# Patient Record
Sex: FEMALE | Race: BLACK OR AFRICAN AMERICAN | NOT HISPANIC OR LATINO | Employment: UNEMPLOYED | URBAN - METROPOLITAN AREA
[De-identification: names, ages, dates, MRNs, and addresses within clinical notes are randomized per-mention and may not be internally consistent; named-entity substitution may affect disease eponyms.]

---

## 2017-04-07 ENCOUNTER — OFFICE VISIT (OUTPATIENT)
Dept: OBSTETRICS AND GYNECOLOGY | Facility: CLINIC | Age: 62
End: 2017-04-07
Payer: MEDICAID

## 2017-04-07 VITALS
HEIGHT: 62 IN | SYSTOLIC BLOOD PRESSURE: 108 MMHG | BODY MASS INDEX: 38.14 KG/M2 | WEIGHT: 207.25 LBS | DIASTOLIC BLOOD PRESSURE: 60 MMHG | TEMPERATURE: 99 F

## 2017-04-07 DIAGNOSIS — R10.2 FEMALE PELVIC PAIN: ICD-10-CM

## 2017-04-07 DIAGNOSIS — E11.59 TYPE 2 DIABETES MELLITUS WITH OTHER CIRCULATORY COMPLICATION: ICD-10-CM

## 2017-04-07 DIAGNOSIS — N85.2 ENLARGED UTERUS: ICD-10-CM

## 2017-04-07 DIAGNOSIS — E78.5 HYPERLIPIDEMIA, UNSPECIFIED HYPERLIPIDEMIA TYPE: ICD-10-CM

## 2017-04-07 DIAGNOSIS — J45.31 MILD PERSISTENT ASTHMA WITH ACUTE EXACERBATION: ICD-10-CM

## 2017-04-07 DIAGNOSIS — Z95.0 PACEMAKER: ICD-10-CM

## 2017-04-07 DIAGNOSIS — Z01.419 WELL WOMAN EXAM WITH ROUTINE GYNECOLOGICAL EXAM: Primary | ICD-10-CM

## 2017-04-07 DIAGNOSIS — I50.9 CONGESTIVE HEART FAILURE, UNSPECIFIED CONGESTIVE HEART FAILURE CHRONICITY, UNSPECIFIED CONGESTIVE HEART FAILURE TYPE: ICD-10-CM

## 2017-04-07 DIAGNOSIS — R60.0 PEDAL EDEMA: ICD-10-CM

## 2017-04-07 DIAGNOSIS — Z12.31 VISIT FOR SCREENING MAMMOGRAM: ICD-10-CM

## 2017-04-07 DIAGNOSIS — Z79.01 CHRONIC ANTICOAGULATION: ICD-10-CM

## 2017-04-07 PROCEDURE — 99213 OFFICE O/P EST LOW 20 MIN: CPT | Mod: PBBFAC | Performed by: OBSTETRICS & GYNECOLOGY

## 2017-04-07 PROCEDURE — 99386 PREV VISIT NEW AGE 40-64: CPT | Mod: S$PBB,,, | Performed by: OBSTETRICS & GYNECOLOGY

## 2017-04-07 PROCEDURE — 88175 CYTOPATH C/V AUTO FLUID REDO: CPT

## 2017-04-07 PROCEDURE — 99999 PR PBB SHADOW E&M-EST. PATIENT-LVL III: CPT | Mod: PBBFAC,,, | Performed by: OBSTETRICS & GYNECOLOGY

## 2017-04-07 RX ORDER — ALBUTEROL SULFATE 0.83 MG/ML
SOLUTION RESPIRATORY (INHALATION)
Refills: 5 | Status: ON HOLD | COMMUNITY
Start: 2017-02-20 | End: 2019-04-22 | Stop reason: HOSPADM

## 2017-04-07 RX ORDER — POTASSIUM CHLORIDE 750 MG/1
10 TABLET, EXTENDED RELEASE ORAL EVERY OTHER DAY
Refills: 2 | Status: ON HOLD | COMMUNITY
Start: 2016-12-31 | End: 2019-08-11 | Stop reason: HOSPADM

## 2017-04-07 RX ORDER — IPRATROPIUM BROMIDE AND ALBUTEROL 20; 100 UG/1; UG/1
1 SPRAY, METERED RESPIRATORY (INHALATION) EVERY 6 HOURS PRN
Refills: 6 | COMMUNITY
Start: 2017-03-14 | End: 2019-03-13

## 2017-04-07 RX ORDER — MONTELUKAST SODIUM 10 MG/1
10 TABLET ORAL DAILY
Refills: 6 | COMMUNITY
Start: 2017-02-18 | End: 2019-05-22

## 2017-04-07 RX ORDER — PREDNISONE 20 MG/1
TABLET ORAL
Refills: 0 | COMMUNITY
Start: 2017-03-30 | End: 2019-04-22

## 2017-04-07 RX ORDER — CLOPIDOGREL BISULFATE 75 MG/1
75 TABLET ORAL DAILY
Refills: 6 | COMMUNITY
Start: 2017-03-16

## 2017-04-07 RX ORDER — CARVEDILOL 6.25 MG/1
TABLET ORAL
Refills: 6 | Status: ON HOLD | COMMUNITY
Start: 2017-03-22 | End: 2019-08-11 | Stop reason: HOSPADM

## 2017-04-07 RX ORDER — LINAGLIPTIN 5 MG/1
5 TABLET, FILM COATED ORAL DAILY
Refills: 2 | COMMUNITY
Start: 2017-01-20 | End: 2019-04-22

## 2017-04-07 RX ORDER — OMEPRAZOLE 20 MG/1
CAPSULE, DELAYED RELEASE ORAL
Refills: 0 | COMMUNITY
Start: 2017-02-13 | End: 2019-04-22

## 2017-04-07 RX ORDER — PRAVASTATIN SODIUM 10 MG/1
TABLET ORAL
Refills: 4 | COMMUNITY
Start: 2017-03-15

## 2017-04-07 RX ORDER — AMIODARONE HYDROCHLORIDE 200 MG/1
TABLET ORAL
Refills: 5 | COMMUNITY
Start: 2017-03-14

## 2017-04-07 RX ORDER — FUROSEMIDE 40 MG/1
TABLET ORAL
Refills: 5 | Status: ON HOLD | COMMUNITY
Start: 2017-03-14 | End: 2019-06-09 | Stop reason: HOSPADM

## 2017-04-07 RX ORDER — ALBUTEROL SULFATE 90 UG/1
AEROSOL, METERED RESPIRATORY (INHALATION)
Refills: 5 | COMMUNITY
Start: 2017-03-14 | End: 2017-04-07 | Stop reason: SDUPTHER

## 2017-04-07 RX ORDER — LEVOTHYROXINE SODIUM 50 UG/1
50 TABLET ORAL DAILY
Refills: 6 | COMMUNITY
Start: 2017-03-14

## 2017-04-07 NOTE — LETTER
April 7, 2017      Gerda Carter MD  [address] [facility] [address]           [facility] [place] [facility]  [address] [facility] [address]  [place] [address]  Phone: [phone]          Patient: Charu Oswald   MR Number: [id number]   YOB: 1955   Date of Visit: 4/7/2017       Dear Dr. Gerda Carter:    Thank you for referring Charu Oswald to me for evaluation. Attached you will find relevant portions of my assessment and plan of care.    If you have questions, please do not hesitate to call me. I look forward to following Charu Oswald along with you.    Sincerely,    Raymond Issa MD    Enclosure  CC:  No Recipients    If you would like to receive this communication electronically, please contact [email or web address] or [phone] to request more information on [facility] access.    For providers and/or their staff who would like to refer a patient to [facility], please contact us through our one-stop-shop provider referral line, [facility] , at [phone].    If you feel you have received this communication in error or would no longer like to receive these types of communications, please e-mail [email or web address]

## 2017-04-07 NOTE — PROGRESS NOTES
Subjective:       Patient ID: Charu Oswald is a 61 y.o. female.    Chief Complaint:  Abdominal Pain      History of Present Illness  HPI  Annual Exam-Postmenopausal  Patient presents for annual exam. The patient is not currently sexually active. GYN screening history: no prior history of gyn screening tests. The patient is not taking hormone replacement therapy. Patient denies post-menopausal vaginal bleeding. The patient wears seatbelts: yes. The patient participates in regular exercise: no. Has the patient ever been transfused or tattooed?: yes. The patient reports that there is not domestic violence in her life.    She complains of vague abdominal pain.  More on lower abdomen.  Sometimes, 8/10.  Not constant.  More at night.  Sometimes more aching than pain.  No radiation.  No aggravating or alleviating factors.  No GI complaint.  Frequent urination.     History of type II Diabetes Mellitus, congestive heart failure, on chronic anticoagulation, asthma.      GYN & OB History  No LMP recorded.   Date of Last Pap: No result found    OB History    Para Term  AB SAB TAB Ectopic Multiple Living   1    1 1          # Outcome Date GA Lbr Guy/2nd Weight Sex Delivery Anes PTL Lv   1 SAB                 Past Medical History:   Diagnosis Date    Asthma     Congestive heart failure     Diabetes mellitus     Type II    Hyperlipidemia     Pacemaker     Pedal edema        Past Surgical History:   Procedure Laterality Date    Cardiac bypass      CARDIAC PACEMAKER PLACEMENT         Family History   Problem Relation Age of Onset    Heart disease Father     Heart disease Mother     Cancer Mother      Bone and breast    Breast cancer Mother 50    Diabetes Brother     Diabetes Sister        Social History     Social History    Marital status: Significant Other     Spouse name: N/A    Number of children: N/A    Years of education: N/A     Social History Main Topics    Smoking status: Former  Smoker    Smokeless tobacco: Never Used    Alcohol use No    Drug use: No    Sexual activity: No     Other Topics Concern    None     Social History Narrative    Together since 4/2004    He is a     She is a CNA    They no longer sexually-active       Current Outpatient Prescriptions   Medication Sig Dispense Refill    albuterol (PROVENTIL) 2.5 mg /3 mL (0.083 %) nebulizer solution USE 1 VAIL IN NEBULIZER EVERY 4 TO 6 HOUR AS NEEDED SOV  5    amiodarone (PACERONE) 200 MG Tab TAKE 1 TABLET BY MOUTH IN THE MORNING AND A HALF TABLET BY MOUTH IN THE EVENING  5    carvedilol (COREG) 6.25 MG tablet TK 1 T PO  BID  6    clopidogrel (PLAVIX) 75 mg tablet Take 75 mg by mouth once daily.  6    COMBIVENT RESPIMAT  mcg/actuation inhaler 1 puff every 6 (six) hours as needed.  6    furosemide (LASIX) 40 MG tablet 1 TABLET BY MOUTH DAILY MAY TAKE TAB IN THE EVENING IF FOR SHORTNESS OF BREATH OR 5LB WEIGHT GAIN  5    levothyroxine (SYNTHROID) 50 MCG tablet Take 50 mcg by mouth once daily.  6    montelukast (SINGULAIR) 10 mg tablet Take 10 mg by mouth once daily.  6    omeprazole (PRILOSEC) 20 MG capsule TK ONE C PO  D  0    potassium chloride SA (K-DUR,KLOR-CON) 10 MEQ tablet Take 10 mEq by mouth every other day.  2    pravastatin (PRAVACHOL) 10 MG tablet TK 1 T PO QHS  4    predniSONE (DELTASONE) 20 MG tablet TK 3 TS PO ONCE D FOR 4 DAYS  0    TRADJENTA 5 mg Tab tablet Take 5 mg by mouth once daily.  2     No current facility-administered medications for this visit.        Review of patient's allergies indicates:   Allergen Reactions    Nut - unspecified Shortness Of Breath       Review of Systems  Review of Systems   Constitutional: Negative for activity change, appetite change, chills, fatigue, fever and unexpected weight change.   HENT: Negative for mouth sores.    Respiratory: Negative for cough, shortness of breath and wheezing.    Cardiovascular: Negative for chest pain and  palpitations.   Gastrointestinal: Positive for abdominal pain. Negative for bloating, blood in stool, constipation, nausea and vomiting.   Endocrine: Negative for diabetes and hot flashes.   Genitourinary: Positive for pelvic pain. Negative for dyspareunia, dysuria, frequency, hematuria, menorrhagia, menstrual problem, urgency, vaginal bleeding, vaginal discharge, vaginal pain, dysmenorrhea, urinary incontinence, postcoital bleeding and vaginal odor.   Musculoskeletal: Negative for back pain and myalgias.   Skin:  Negative for rash.   Neurological: Negative for seizures and headaches.   Psychiatric/Behavioral: Negative for depression and sleep disturbance. The patient is not nervous/anxious.    Breast: Negative for breast mass, breast pain and nipple discharge          Objective:    Physical Exam:   Constitutional: She appears well-developed and well-nourished. No distress.    HENT:   Head: Normocephalic and atraumatic.    Eyes: EOM are normal.    Neck: Normal range of motion.     Pulmonary/Chest: Effort normal. No respiratory distress.   Breasts: Non-tender, no engorgement, no masses, no retraction, no discharge. Negative for lymphadenopathy.         Abdominal: Soft. She exhibits no distension. There is no tenderness. There is no rebound and no guarding.     Genitourinary: Vagina normal. No vaginal discharge found.   Genitourinary Comments: Vulva without any obvious lesions.  Vaginal vault with good support.  Minimal discharge noted.  No obvious lesion.  Cervix is without any cervical motion tenderness.  No obvious lesion.  Uterus is about 14 weeks, minimally tender, normal contour.  Adnexa is without any masses or tenderness.    ?Bladder tenderness           Musculoskeletal: Normal range of motion.       Neurological: She is alert.    Skin: Skin is warm and dry.    Psychiatric: She has a normal mood and affect.          Urinalysis negative    Assessment:        1. Well woman exam with routine gynecological exam     2. Type 2 diabetes mellitus with other circulatory complication    3. Mild persistent asthma with acute exacerbation    4. Hyperlipidemia, unspecified hyperlipidemia type    5. Congestive heart failure, unspecified congestive heart failure chronicity, unspecified congestive heart failure type    6. Pedal edema    7. Pacemaker    8. Female pelvic pain    9. Visit for screening mammogram    10. Chronic anticoagulation    11. Enlarged uterus              Plan:          I have discussed with the patient her condition.  Monthly breast examination was instructed, discussed, and encouraged.  Patient was encouraged to consume a low-calorie, low fat diet, and to increase of physical activity.  Healthy habits encouraged.  A Pap smear was performed.  Mammogram was ordered because of the combination of her age and risk factors.  Gonorrhea and Chlamydia testing not performed.  HIV test not ordered.  Patient is to continue her medications as prescribed.  She will come back to see me in one year for her annual visit.  She can come back to see me sooner as necessary.  All of her questions were answered appropriately to her satisfaction.     We discussed her abdominal/pelvic pain with uterine enlargement  Pelvic ultrasound ordered  She will be back after ultrasound for results.

## 2017-04-07 NOTE — MR AVS SNAPSHOT
[facility] - OB/ GYN  [address] [facility] [address]  [place] [address]  Phone: [phone]                  Charu Oswald   2017 9:00 AM   Office Visit    Description:  Female : 1955   Provider:  Raymond Issa MD   Department:  [facility] - OB/ GYN           Reason for Visit     Abdominal Pain           Diagnoses this Visit        Comments    Well woman exam with routine gynecological exam    -  Primary     Type 2 diabetes mellitus with other circulatory complication         Mild persistent asthma with acute exacerbation         Hyperlipidemia, unspecified hyperlipidemia type         Congestive heart failure, unspecified congestive heart failure chronicity, unspecified congestive heart failure type         Pedal edema         Pacemaker         Female pelvic pain         Visit for screening mammogram         Chronic anticoagulation         Enlarged uterus                To Do List           Goals (5 Years of Data)     None      Follow-Up and Disposition     Return in about 4 weeks (around 2017).      [facility]     [facility] Nurse Care Line -  Assistance  Unless otherwise directed by your provider, please contact [facility], our nurse care line that is available for  assistance.     Registered nurses in the [facility] provide: appointment scheduling, clinical advisement, health education, and other advisory services.  Call: [phone] (toll free)               Medications           Message regarding Medications     Verify the changes and/or additions to your medication regime listed below are the same as discussed with your clinician today.  If any of these changes or additions are incorrect, please notify your healthcare provider.        STOP taking these medications     VENTOLIN HFA 90 mcg/actuation inhaler 2 PUFFS EVERY 4-6 HOURS AS NEEDED FOR SHORTNESS OF BREATH           Verify that the below list of medications is an accurate representation of the  "medications you are currently taking.  If none reported, the list may be blank. If incorrect, please contact your healthcare provider. Carry this list with you in case of emergency.           Current Medications     albuterol (PROVENTIL) 2.5 mg /3 mL (0.083 %) nebulizer solution USE 1 VAIL IN NEBULIZER EVERY 4 TO 6 HOUR AS NEEDED SOV    amiodarone (PACERONE) 200 MG Tab TAKE 1 TABLET BY MOUTH IN THE MORNING AND A HALF TABLET BY MOUTH IN THE EVENING    carvedilol (COREG) 6.25 MG tablet TK 1 T PO  BID    clopidogrel (PLAVIX) 75 mg tablet Take 75 mg by mouth once daily.    COMBIVENT RESPIMAT  mcg/actuation inhaler 1 puff every 6 (six) hours as needed.    furosemide (LASIX) 40 MG tablet 1 TABLET BY MOUTH DAILY MAY TAKE TAB IN THE EVENING IF FOR SHORTNESS OF BREATH OR 5LB WEIGHT GAIN    levothyroxine (SYNTHROID) 50 MCG tablet Take 50 mcg by mouth once daily.    montelukast (SINGULAIR) 10 mg tablet Take 10 mg by mouth once daily.    omeprazole (PRILOSEC) 20 MG capsule TK ONE C PO  D    potassium chloride SA (K-DUR,KLOR-CON) 10 MEQ tablet Take 10 mEq by mouth every other day.    pravastatin (PRAVACHOL) 10 MG tablet TK 1 T PO QHS    predniSONE (DELTASONE) 20 MG tablet TK 3 TS PO ONCE D FOR 4 DAYS    TRADJENTA 5 mg Tab tablet Take 5 mg by mouth once daily.           Clinical Reference Information           Your Vitals Were     BP Temp Height Weight BMI    108/60 (BP Location: Right arm, Patient Position: Sitting, BP Method: Manual) 98.6 °F (37 °C) (Oral) 5' 2" (1.575 m) 94 kg (207 lb 3.7 oz) 37.9 kg/m2      Blood Pressure          Most Recent Value    BP  108/60      Allergies as of 4/7/2017     Nut - Unspecified      Immunizations Administered on Date of Encounter - 4/7/2017     None      Orders Placed During Today's Visit      Normal Orders This Visit    Liquid-based pap smear, screening     Future Labs/Procedures Expected by Expires    Mammo Digital Screening Bilat with CAD  4/7/2017 6/7/2018    US Pelvis Comp with " Transvag NON-OB (xpd  4/7/2017 4/7/2018      Language Assistance Services     ATTENTION: Language assistance services are available, free of charge. Please call [phone].      ATENCIÓN: Si shadi sullivan, tiene a crowell disposición servicios gratuitos de asistencia lingüística. Llame al [phone].     CHÚ Ý: N?u b?n nói Ti?ng Vi?t, có các d?ch v? h? tr? ngôn ng? mi?n phí dành cho b?n. G?i s? [phone].         [facility] [place] - [facility] complies with applicable Federal civil rights laws and does not discriminate on the basis of race, color, national origin, age, disability, or sex.

## 2017-04-09 ENCOUNTER — HOSPITAL ENCOUNTER (EMERGENCY)
Facility: OTHER | Age: 62
Discharge: HOME | End: 2017-04-09
Attending: EMERGENCY MEDICINE
Payer: MEDICAID

## 2017-04-09 VITALS
DIASTOLIC BLOOD PRESSURE: 82 MMHG | HEART RATE: 62 BPM | OXYGEN SATURATION: 99 % | RESPIRATION RATE: 18 BRPM | SYSTOLIC BLOOD PRESSURE: 148 MMHG | BODY MASS INDEX: 36.44 KG/M2 | WEIGHT: 198 LBS | HEIGHT: 62 IN

## 2017-04-09 DIAGNOSIS — I50.9 ACUTE ON CHRONIC CONGESTIVE HEART FAILURE, UNSPECIFIED CONGESTIVE HEART FAILURE TYPE: Primary | ICD-10-CM

## 2017-04-09 LAB
ALBUMIN SERPL-MCNC: 3.1 G/DL (ref 3.3–5.5)
ALP SERPL-CCNC: 81 U/L (ref 42–141)
BILIRUB SERPL-MCNC: 0.4 MG/DL (ref 0.2–1.6)
BUN SERPL-MCNC: 17 MG/DL (ref 7–22)
CALCIUM SERPL-MCNC: 9 MG/DL (ref 8–10.3)
CHLORIDE SERPL-SCNC: 98 MMOL/L (ref 98–108)
CREAT SERPL-MCNC: 1.8 MG/DL (ref 0.6–1.2)
GLUCOSE SERPL-MCNC: 287 MG/DL (ref 73–118)
POC ALT (SGPT): 18 U/L (ref 10–47)
POC AST (SGOT): 23 U/L (ref 11–38)
POC B-TYPE NATRIURETIC PEPTIDE: 946 PG/ML (ref 0–100)
POC CARDIAC TROPONIN I: 0.03 NG/ML
POC PTINR: 1 (ref 0.9–1.2)
POC PTWBT: 12.2 SEC (ref 9.7–14.3)
POC TCO2: 27 MMOL/L (ref 18–33)
POTASSIUM BLD-SCNC: 3.6 MMOL/L (ref 3.6–5.1)
PROTEIN, POC: 7 G/DL (ref 6.4–8.1)
SAMPLE: NORMAL
SAMPLE: NORMAL
SODIUM BLD-SCNC: 148 MMOL/L (ref 128–145)

## 2017-04-09 PROCEDURE — 85610 PROTHROMBIN TIME: CPT

## 2017-04-09 PROCEDURE — 96374 THER/PROPH/DIAG INJ IV PUSH: CPT

## 2017-04-09 PROCEDURE — 99284 EMERGENCY DEPT VISIT MOD MDM: CPT | Mod: 25

## 2017-04-09 PROCEDURE — 25000003 PHARM REV CODE 250: Performed by: EMERGENCY MEDICINE

## 2017-04-09 PROCEDURE — 93005 ELECTROCARDIOGRAM TRACING: CPT

## 2017-04-09 PROCEDURE — 63600175 PHARM REV CODE 636 W HCPCS: Performed by: EMERGENCY MEDICINE

## 2017-04-09 PROCEDURE — 83880 ASSAY OF NATRIURETIC PEPTIDE: CPT

## 2017-04-09 PROCEDURE — 80053 COMPREHEN METABOLIC PANEL: CPT

## 2017-04-09 PROCEDURE — 85025 COMPLETE CBC W/AUTO DIFF WBC: CPT

## 2017-04-09 PROCEDURE — 84484 ASSAY OF TROPONIN QUANT: CPT

## 2017-04-09 PROCEDURE — 93010 ELECTROCARDIOGRAM REPORT: CPT | Mod: ,,, | Performed by: INTERNAL MEDICINE

## 2017-04-09 RX ORDER — FUROSEMIDE 10 MG/ML
40 INJECTION INTRAMUSCULAR; INTRAVENOUS
Status: COMPLETED | OUTPATIENT
Start: 2017-04-09 | End: 2017-04-09

## 2017-04-09 RX ORDER — FUROSEMIDE 40 MG/1
40 TABLET ORAL 2 TIMES DAILY
Qty: 10 TABLET | Refills: 0 | Status: SHIPPED | OUTPATIENT
Start: 2017-04-09 | End: 2017-04-14

## 2017-04-09 RX ADMIN — FUROSEMIDE 40 MG: 10 INJECTION, SOLUTION INTRAMUSCULAR; INTRAVENOUS at 07:04

## 2017-04-09 RX ADMIN — NITROGLYCERIN 1 INCH: 20 OINTMENT TOPICAL at 07:04

## 2017-04-09 NOTE — ED NOTES
Dyspnea: Patient complains of shortness of breath at rest, and with any activity.  Symptoms include dry cough, dyspnea on exertion, edema  in the hands and shortness of breath. Symptoms began a few days ago, gradually worsening since that time.  Patient denies drainage from nose, hemoptysis or bleeding from her nose and tightness in chest. Associated symptoms include myalgias and nasal congestion. Patient has not had recent travel.  Weight has been stable.  Appetite has been varying. Symptoms are exacerbated by any exercise, exposure to cold air, housework, lying flat and walking. Symptoms are alleviated by rest, nitroglycerine and diuretics.

## 2017-04-09 NOTE — ED PROVIDER NOTES
Encounter Date: 4/9/2017       History     Chief Complaint   Patient presents with    Shortness of Breath     CHF     Review of patient's allergies indicates:   Allergen Reactions    Nut - unspecified Shortness Of Breath     Patient is a 61 y.o. female presenting with the following complaint: shortness of breath. The history is provided by the patient.   Shortness of Breath   This is a recurrent problem. The problem occurs frequently (Worse when I lay flat).The problem has been gradually worsening. Associated symptoms include wheezing, PND and orthopnea. Pertinent negatives include no fever, no coryza, no sore throat, no cough, no hemoptysis, no chest pain, no syncope, no leg pain and no leg swelling. She has tried beta-agonist inhalers for the symptoms. She has had prior hospitalizations. She has had prior ED visits. Associated medical issues include heart failure.     Past Medical History:   Diagnosis Date    Asthma     Congestive heart failure     Diabetes mellitus     Type II    Hyperlipidemia     Pacemaker     Pedal edema      Past Surgical History:   Procedure Laterality Date    Cardiac bypass  1994    CARDIAC PACEMAKER PLACEMENT  2016     Family History   Problem Relation Age of Onset    Heart disease Father     Heart disease Mother     Cancer Mother      Bone and breast    Breast cancer Mother 50    Diabetes Brother     Diabetes Sister      Social History   Substance Use Topics    Smoking status: Former Smoker    Smokeless tobacco: Never Used    Alcohol use No     Review of Systems   Constitutional: Negative.  Negative for fever.   HENT: Negative.  Negative for sore throat.    Eyes: Negative.    Respiratory: Positive for shortness of breath and wheezing. Negative for cough and hemoptysis.    Cardiovascular: Positive for orthopnea and PND. Negative for chest pain, leg swelling and syncope.   Gastrointestinal: Negative.    Endocrine: Negative.    Genitourinary: Negative.    Musculoskeletal:  Negative.    Skin: Negative.    Allergic/Immunologic: Negative.    Neurological: Negative.    Hematological: Negative.    Psychiatric/Behavioral: Negative.    All other systems reviewed and are negative.      Physical Exam   Initial Vitals   BP Pulse Resp Temp SpO2   04/09/17 0707 04/09/17 0707 04/09/17 0707 -- 04/09/17 0707   150/84 79 16  100 %     Physical Exam    Nursing note and vitals reviewed.  Constitutional: Vital signs are normal. She appears well-developed. She is active and cooperative.   HENT:   Head: Normocephalic and atraumatic.   Eyes: Conjunctivae, EOM and lids are normal. Pupils are equal, round, and reactive to light.   Neck: Trachea normal and full passive range of motion without pain. Neck supple. No thyroid mass present.   Cardiovascular: Normal rate, regular rhythm, S1 normal, S2 normal, normal heart sounds, intact distal pulses and normal pulses.   Pulmonary/Chest: She has rales.   Abdominal: Soft. Normal appearance, normal aorta and bowel sounds are normal.   Musculoskeletal: Normal range of motion.   Lymphadenopathy:     She has no axillary adenopathy.   Neurological: She is alert and oriented to person, place, and time.   Skin: Skin is warm, dry and intact.   Psychiatric: She has a normal mood and affect. Her speech is normal and behavior is normal. Judgment and thought content normal. Cognition and memory are normal.         ED Course   Procedures  Labs Reviewed   POCT CBC   POCT CMP   POCT TROPONIN   POCT B-TYPE NATRIURETIC PEPTIDE (BNP)   POCT PROTIME-INR     EKG Readings: (Independently Interpreted)   Initial Reading: No STEMI. Rhythm: Normal Sinus Rhythm. Heart Rate: 65. Ectopy: No Ectopy. Conduction: Normal (Nonspecific intraventricular conduction delay). ST Segments: Non-Specific ST Segment Depression. T Waves: Normal. Axis: Normal. Clinical Impression: Normal Sinus Rhythm      Imaging Results         X-Ray Chest AP Portable (Final result) Result time:  04/09/17 07:47:55    Final  result by Interface, Rad Results In (04/09/17 07:47:55)    Narrative:    Exam: Portable AP radiograph of the chest     Indication: Heart failure     Comparison: None available      Single lead pacemaker is intact.  Postsurgical CABG changes are noted.  The heart is   enlarged.  Mediastinum is within normal limits.  Pulmonary vessels are engorged.  Lung   apices are clear.  No significant pleural effusion.  Opacities overlying the lung bases   likely represent overlying soft tissues.     Impression:     Cardiomegaly and mild pulmonary vascular congestion without evidence for yuridia pulmonary   edema.     SL: 24 Signed by: Gray Oshea MD  2017-04-09 07:47:54                  Admission on 04/09/2017   Component Date Value Ref Range Status    POC PTWBT 04/09/2017 12.2  9.7 - 14.3 sec Final    POC PTINR 04/09/2017 1.0  0.9 - 1.2 Final    Sample 04/09/2017 UNK   Final    POC Cardiac Troponin I 04/09/2017 0.03  <0.09 ng/mL Final    Sample 04/09/2017 UNK   Final    Albumin, POC 04/09/2017 3.1* 3.3 - 5.5 g/dL Final    Alkaline Phosphatase, POC 04/09/2017 81  42 - 141 U/L Final    ALT (SGPT), POC 04/09/2017 18  10 - 47 U/L Final    AST (SGOT), POC 04/09/2017 23  11 - 38 U/L Final    POC BUN 04/09/2017 17  7 - 22 mg/dL Final    Calcium, POC 04/09/2017 9.0  8.0 - 10.3 mg/dL Final    POC Chloride 04/09/2017 98  98 - 108 mmol/L Final    POC Creatinine 04/09/2017 1.8* 0.6 - 1.2 mg/dL Final    POC Glucose 04/09/2017 287* 73 - 118 mg/dL Final    POC Potassium 04/09/2017 3.6* 3.6 - 5.1 mmol/L Final    POC Sodium 04/09/2017 148* 128 - 145 mmol/L Final    Bilirubin 04/09/2017 0.4  0.2 - 1.6 mg/dL Final    POC TCO2 04/09/2017 27  18 - 33 mmol/L Final    Protein 04/09/2017 7.0  6.4 - 8.1 g/dL Final    POC B-Type Natriuretic Peptide 04/09/2017 946* 0.0 - 100.0 pg/mL Final       Medical Decision Making:   ED Management:  The patient was given nitroglycerin paste as well as Lasix IV.  She adequately diuresed in  the department her chest x-ray evaluation shows some mild pulmonary vascular congestion but no signs of edema.  Patient's BNP is running around 900.  The patient's currently laying in bed resting comfortably and is not short of breath.  The patient can be managed as an outpatient as a mild exacerbation of her chronic congestive heart failure.                   ED Course     Clinical Impression:   The encounter diagnosis was Acute on chronic congestive heart failure, unspecified congestive heart failure type.          Gato Casper MD  04/09/17 0918       Gato Casper MD  04/09/17 0924

## 2017-04-09 NOTE — ED TRIAGE NOTES
SOB onset 4 days usually goes to [facility] also poor historian and medications unknown and non-compliant

## 2017-04-09 NOTE — ED AVS SNAPSHOT
[facility] EMERGENCY DEPARTMENT  [address] [facility]  [place] [address]               Charu Oswald   2017  7:06 AM   ED    Description:  Female : 1955   Department:  [facility]           Your Care was Coordinated By:     Provider Role From To    Gato Casper MD Attending Provider 17 0714 --      Reason for Visit     Shortness of Breath           Diagnoses this Visit        Comments    Acute on chronic congestive heart failure, unspecified congestive heart failure type    -  Primary       ED Disposition     ED Disposition Condition Comment    Discharge             To Do List           Follow-up Information     Follow up with Primary Doctor No In 1 week(s).       These Medications        Disp Refills Start End    furosemide (LASIX) 40 MG tablet 10 tablet 0 2017    Take 1 tablet (40 mg total) by mouth 2 (two) times daily. - Oral    Pharmacy: [id number]  [place]  [address] [place] [address] AT [facility] #: [phone]         [facility]     [facility] Nurse Care Line -  Assistance  Unless otherwise directed by your provider, please contact [facility], our nurse care line that is available for  assistance.     Registered nurses in the [facility] Center provide: appointment scheduling, clinical advisement, health education, and other advisory services.  Call: [phone] (toll free)               Medications           Message regarding Medications     Verify the changes and/or additions to your medication regime listed below are the same as discussed with your clinician today.  If any of these changes or additions are incorrect, please notify your healthcare provider.        START taking these NEW medications        Refills    furosemide (LASIX) 40 MG tablet 0    Sig: Take 1 tablet (40 mg total) by mouth 2 (two) times daily.    Class: Print    Route: Oral      These medications were  administered today        Dose Freq    nitroGLYCERIN 2% TD oint ointment 1 inch 1 inch ED 1 Time    Sig: Apply 1 inch topically ED 1 Time.    Class: Normal    Route: Topical    furosemide injection 40 mg 40 mg ED 1 Time    Sig: Inject 4 mLs (40 mg total) into the vein ED 1 Time.    Class: Normal    Route: Intravenous           Verify that the below list of medications is an accurate representation of the medications you are currently taking.  If none reported, the list may be blank. If incorrect, please contact your healthcare provider. Carry this list with you in case of emergency.           Current Medications     albuterol (PROVENTIL) 2.5 mg /3 mL (0.083 %) nebulizer solution USE 1 VAIL IN NEBULIZER EVERY 4 TO 6 HOUR AS NEEDED SOV    amiodarone (PACERONE) 200 MG Tab TAKE 1 TABLET BY MOUTH IN THE MORNING AND A HALF TABLET BY MOUTH IN THE EVENING    carvedilol (COREG) 6.25 MG tablet TK 1 T PO  BID    clopidogrel (PLAVIX) 75 mg tablet Take 75 mg by mouth once daily.    COMBIVENT RESPIMAT  mcg/actuation inhaler 1 puff every 6 (six) hours as needed.    furosemide (LASIX) 40 MG tablet 1 TABLET BY MOUTH DAILY MAY TAKE TAB IN THE EVENING IF FOR SHORTNESS OF BREATH OR 5LB WEIGHT GAIN    furosemide (LASIX) 40 MG tablet Take 1 tablet (40 mg total) by mouth 2 (two) times daily.    furosemide injection 40 mg Inject 4 mLs (40 mg total) into the vein ED 1 Time.    levothyroxine (SYNTHROID) 50 MCG tablet Take 50 mcg by mouth once daily.    montelukast (SINGULAIR) 10 mg tablet Take 10 mg by mouth once daily.    omeprazole (PRILOSEC) 20 MG capsule TK ONE C PO  D    potassium chloride SA (K-DUR,KLOR-CON) 10 MEQ tablet Take 10 mEq by mouth every other day.    pravastatin (PRAVACHOL) 10 MG tablet TK 1 T PO QHS    predniSONE (DELTASONE) 20 MG tablet TK 3 TS PO ONCE D FOR 4 DAYS    TRADJENTA 5 mg Tab tablet Take 5 mg by mouth once daily.           Clinical Reference Information           Your Vitals Were     BP Pulse Resp Height  "Weight SpO2    148/82 (Patient Position: Sitting) 62 18 5' 2" (1.575 m) 89.8 kg (198 lb) 99%    BMI                36.21 kg/m2          Allergies as of 4/9/2017        Reactions    Nut - Unspecified Shortness Of Breath      Immunizations Administered on Date of Encounter - 4/9/2017     None      ED Micro, Lab, POCT     Start Ordered       Status Ordering Provider    04/09/17 0757 04/09/17 0757  POCT B-type natriuretic peptide (BNP)  Once      Final result     04/09/17 0733 04/09/17 0733  Troponin ISTAT  Once      Final result     04/09/17 0730 04/09/17 0730  ISTAT PROCEDURE  Once      Final result     04/09/17 0728 04/09/17 0728  POCT CMP  Once      Final result     04/09/17 0720 04/09/17 0720  POCT CBC  Once      Final result     04/09/17 0720 04/09/17 0720  POCT CMP  Once      Completed     04/09/17 0720 04/09/17 0720  POCT Troponin  Once      Completed     04/09/17 0720 04/09/17 0720  POCT B-type natriuretic peptide (BNP)  Once      Completed     04/09/17 0720 04/09/17 0720  POCT Protime-INR  Once      Completed       ED Imaging Orders     Start Ordered       Status Ordering Provider    04/09/17 0720 04/09/17 0720  X-Ray Chest AP Portable  1 time imaging      Final result         Discharge Instructions           Left- or Right- Side Congestive Heart Failure    The heart is a large muscle. It is a pump that circulates blood throughout the body. Blood carries oxygen to all of the organs, including the brain, muscles, and skin. After your body takes the oxygen out of the blood, the blood returns to the heart. The right side of the heart collects the blood from the body and pumps it to the lungs. In the lungs, it gets fresh oxygen and gives up carbon dioxide. The oxygen-rich blood from the lungs then returns to the left side of the heart, where it is pumped back out to the rest of your body, starting the process all over.  Congestive heart failure (CHF) occurs when the heart muscle is weakened. This affects the " pumping action of the heart. Heart failure can affect the right side of the heart or the left side. But heart failure may affect not only the right side of the heart or only the left side. Although it may have started on one side, it can and often eventually does affect both sides.  Right-side heart failure  When the right side of the heart is weakened, it cant handle the blood it is getting from the rest of the body. This blood returns to the heart through veins. When too much pressure builds up in the veins, fluid leaks out into the tissues. Gravity then causes that fluid to move to those parts of the body that are the lowest. So one of the first symptoms of right-side CHF can include swelling in the feet and ankles. If the condition gets worse, the swelling can even go up past the knees. Sometimes it gets so severe, the liver can get congested as well.  Left-side heart failure  When the left side of the heart is weakened, it cant handle the blood it gets from the lungs. Pressure then builds up in the veins of the lungs, causing fluid to leak into the lung tissues. This may cause CHF and pulmonary edema. This causes you to feel short of breath, weak, or dizzy. These symptoms are often worse with exertion, such as when climbing stairs or walking up hills. Lying with your head flat is uncomfortable and can make your breathing worse. This may make sleeping difficult. You may need to use extra pillows to elevate your upper body to sleep well. The same is true when just resting during the daytime.  There are many causes of heart failure including:  · Coronary artery disease  · Past heart attack (also known as acute myocardial infarction, or AMI)  · High blood pressure  · Damaged heart valve  · Diabetes  · Obesity  · Cigarette smoking  · Alcohol abuse  Heart failure is a chronic condition. There is no cure. The purpose of medical treatment is to improve the pumping action of the heart. The main way to do this is  to remove excess water from the body. A number of medicines can help reach this goal, improve symptoms, and prevent the heart from becoming weaker. Sometimes, heart failure can become so severe that a device is placed in the heart to help with pumping. Another major goal is to better treat the causes of heart failure, such as diabetes and high blood pressure, by making changes in your lifestyle and maximizing medical control when needed.  Home care  Follow these guidelines when caring for yourself at home:  · Check your weight every day. This is very important because a sudden increase in weight gain could mean worsening heart failure. Keep these things in mind:  ¨ Use the same scale every day.  ¨ Weigh yourself at the same time every day.  ¨ Make sure the scale is on a hard floor surface, not on a rug or carpet.  ¨ Keep a record of your weight every day so your healthcare provider can see it. If you are not given a log sheet for this, keep a separate journal for this purpose.   · Cut back on the amount of salt (sodium) you eat. Follow your healthcare provider's recommendation on how much salt or sodium you should have each day.  ¨ Avoid high-salt foods. These include olives, pickles, smoked meats, salted potato chips, and most prepared foods.  ¨ Don't add salt to your food at the table. Use only small amounts of salt when cooking.  ¨ Read the labels carefully on food packages to learn how much salt or sodium is in each serving in the package. Remember, a can or package of food may contain more than 1 serving. So if you eat all the food in the package, you may be getting more salt than you think.  · Follow your healthcare provider's recommendations about how much fluid you should have. Be aware that some foods, such as soup, pudding, and juicy fruits like oranges or melons, contain liquid. You'll need to count the liquid in those foods as part of your daily fluid intake. Your provider can help you with this.  · Stop  smoking.  · Cut back on how much alcohol you drink.  · Lose weight if you are overweight. The excess weight adds a lot of stress on the workload of the heart.  · Stay active. Talk with your provider about an exercise program that is safe for your heart.  · Keep your feet elevated to reduce swelling. Ask your provider about support hose as a preventive treatment for daytime leg swelling.  Besides taking your medicine as instructed, an important part of treatment is lifestyle changes. These include diet, physical activity, stopping smoking, and weight control.  Improve your diet by including more fresh foods, cutting back on how much sugar and saturated fat you eat, and eating fewer processed foods and less salt.  Follow-up care  Follow up with your healthcare provider, or as advised.  Make sure to keep any appointments that were made for you. These can help better control your congestive heart failure. You will need to follow up with your provider on a routine basis to make sure your heart failure is well managed.  If an X-ray, ECG, or other tests were done, you will be told of any new findings that may affect your care.  Call 911  Call 911 if you:  · Become severely short of breath  · Feel lightheaded, or feel like you might pass out or faint  · Have chest pain or discomfort that is different than usual, the medicines your doctor told you to use for this don't help, or the pain lasts longer than 10 to 15 minutes  · You suddenly develop a rapid heart rate  When to seek medical advice  The following may be signs that your heart failure is getting worse. Call your healthcare provider right away if any of these happen:  · Sudden weight gain. This means 3 or more pounds in one day, or 5 or more pounds in 1 week.  · Trouble breathing not related to being active  · New or increased swelling of your legs or ankles  · Swelling or pain in your abdomen  · Breathing trouble at night. This means waking up short of breath  or needing more pillows to breathe.  · Frequent coughing that doesnt go away  · Feeling much more tired than usual  Date Last Reviewed: 1/4/2016  © 8834-0529 [employer]. [address] [place] [address], [place] [address]. All rights reserved. This information is not intended as a substitute for professional medical care. Always follow your healthcare professional's instructions.          Coping with Heart Failure  Its normal to feel sad or down at times when youre living with heart failure. Some medicines can also affect your mood. Following your treatment plan may seem difficult at times. If you feel overwhelmed, just focus on one day at a time. Dont be afraid to ask others for help when you need it.    Ways to feel better  Try not to withdraw from family and friends, even if you are finding it hard to talk to them. They can still be a good source of support. To feel better, you can also:  · Spend time doing things you enjoy. This may include participating in a favorite hobby, meditating, praying, or spending time with people you care about. Find activities that make you happy and make those a priority.  · Share what you learn about heart failure with the people in your life. Invite family members along when you visit your healthcare provider. This will help you feel supported as well as help you discuss the care plan you've agreed upon with your doctor.  · Think about joining a support group for people with heart failure. It may be easier to talk to people who know firsthand what youre going through. They can offer advice and share stories. You may want to ask loved ones to join you for a meeting.  Asking for help  Having heart failure doesnt mean that you have to feel bad all the time. Consider talking to your healthcare provider or a therapist if:  · You feel worthless or helpless, or are thinking about suicide. These are warning signs of depression. Treatment can help you feel better. When  depression is under control, your overall health may also improve.  · You feel anxious about what will happen to your loved ones if your health gets worse. Taking care of legal arrangements, such as a living will and durable power of , can help you feel more secure about the future.  · Social support helps alleviate stress and helps your stick with your healthy lifestyle changes. Without social support, you may end up back in the hospital.  Date Last Reviewed: 3/20/2016  © 2071-0041 [employer]. [address] [place] [address], [place] [address]. All rights reserved. This information is not intended as a substitute for professional medical care. Always follow your healthcare professional's instructions.          Smoking Cessation     If you would like to quit smoking:   You may be eligible for free services if you are a [place] resident and started smoking cigarettes before September 1, 1988.  Call the Smoking Cessation Trust (SCT) toll free at (764) 978-6042 or (998) 438-8405.   Call 1-800-QUIT-NOW if you do not meet the above criteria.   Contact us via email: [email or web address]   View our website for more information: [email or web address]         [facility] complies with applicable Federal civil rights laws and does not discriminate on the basis of race, color, national origin, age, disability, or sex.        Language Assistance Services     ATTENTION: Language assistance services are available, free of charge. Please call [phone].      ATENCIÓN: Si habla español, tiene a crowell disposición servicios gratuitos de asistencia lingüística. Llame al [phone].     CHÚ Ý: N?u b?n nói Ti?ng Vi?t, có các d?ch v? h? tr? ngôn ng? mi?n phí dành cho b?n. G?i s? [phone].

## 2017-04-09 NOTE — ED NOTES
Physician at bedside. Patient update on plan of care and reevaluation to notify patient to double her Lasix for the next 5 days. Patient verbalize understanding of her instructions

## 2017-04-09 NOTE — ED NOTES
Comfort measures done. Turned lights off provided a pillow and warm blanket. Updated patient on plan of care side rails up x 1.

## 2017-04-09 NOTE — DISCHARGE INSTRUCTIONS
Left- or Right- Side Congestive Heart Failure    The heart is a large muscle. It is a pump that circulates blood throughout the body. Blood carries oxygen to all of the organs, including the brain, muscles, and skin. After your body takes the oxygen out of the blood, the blood returns to the heart. The right side of the heart collects the blood from the body and pumps it to the lungs. In the lungs, it gets fresh oxygen and gives up carbon dioxide. The oxygen-rich blood from the lungs then returns to the left side of the heart, where it is pumped back out to the rest of your body, starting the process all over.  Congestive heart failure (CHF) occurs when the heart muscle is weakened. This affects the pumping action of the heart. Heart failure can affect the right side of the heart or the left side. But heart failure may affect not only the right side of the heart or only the left side. Although it may have started on one side, it can and often eventually does affect both sides.  Right-side heart failure  When the right side of the heart is weakened, it cant handle the blood it is getting from the rest of the body. This blood returns to the heart through veins. When too much pressure builds up in the veins, fluid leaks out into the tissues. Gravity then causes that fluid to move to those parts of the body that are the lowest. So one of the first symptoms of right-side CHF can include swelling in the feet and ankles. If the condition gets worse, the swelling can even go up past the knees. Sometimes it gets so severe, the liver can get congested as well.  Left-side heart failure  When the left side of the heart is weakened, it cant handle the blood it gets from the lungs. Pressure then builds up in the veins of the lungs, causing fluid to leak into the lung tissues. This may cause CHF and pulmonary edema. This causes you to feel short of breath, weak, or dizzy. These symptoms are often worse with exertion, such as  when climbing stairs or walking up hills. Lying with your head flat is uncomfortable and can make your breathing worse. This may make sleeping difficult. You may need to use extra pillows to elevate your upper body to sleep well. The same is true when just resting during the daytime.  There are many causes of heart failure including:  · Coronary artery disease  · Past heart attack (also known as acute myocardial infarction, or AMI)  · High blood pressure  · Damaged heart valve  · Diabetes  · Obesity  · Cigarette smoking  · Alcohol abuse  Heart failure is a chronic condition. There is no cure. The purpose of medical treatment is to improve the pumping action of the heart. The main way to do this is to remove excess water from the body. A number of medicines can help reach this goal, improve symptoms, and prevent the heart from becoming weaker. Sometimes, heart failure can become so severe that a device is placed in the heart to help with pumping. Another major goal is to better treat the causes of heart failure, such as diabetes and high blood pressure, by making changes in your lifestyle and maximizing medical control when needed.  Home care  Follow these guidelines when caring for yourself at home:  · Check your weight every day. This is very important because a sudden increase in weight gain could mean worsening heart failure. Keep these things in mind:  ¨ Use the same scale every day.  ¨ Weigh yourself at the same time every day.  ¨ Make sure the scale is on a hard floor surface, not on a rug or carpet.  ¨ Keep a record of your weight every day so your healthcare provider can see it. If you are not given a log sheet for this, keep a separate journal for this purpose.   · Cut back on the amount of salt (sodium) you eat. Follow your healthcare provider's recommendation on how much salt or sodium you should have each day.  ¨ Avoid high-salt foods. These include olives, pickles, smoked meats, salted potato chips, and  most prepared foods.  ¨ Don't add salt to your food at the table. Use only small amounts of salt when cooking.  ¨ Read the labels carefully on food packages to learn how much salt or sodium is in each serving in the package. Remember, a can or package of food may contain more than 1 serving. So if you eat all the food in the package, you may be getting more salt than you think.  · Follow your healthcare provider's recommendations about how much fluid you should have. Be aware that some foods, such as soup, pudding, and juicy fruits like oranges or melons, contain liquid. You'll need to count the liquid in those foods as part of your daily fluid intake. Your provider can help you with this.  · Stop smoking.  · Cut back on how much alcohol you drink.  · Lose weight if you are overweight. The excess weight adds a lot of stress on the workload of the heart.  · Stay active. Talk with your provider about an exercise program that is safe for your heart.  · Keep your feet elevated to reduce swelling. Ask your provider about support hose as a preventive treatment for daytime leg swelling.  Besides taking your medicine as instructed, an important part of treatment is lifestyle changes. These include diet, physical activity, stopping smoking, and weight control.  Improve your diet by including more fresh foods, cutting back on how much sugar and saturated fat you eat, and eating fewer processed foods and less salt.  Follow-up care  Follow up with your healthcare provider, or as advised.  Make sure to keep any appointments that were made for you. These can help better control your congestive heart failure. You will need to follow up with your provider on a routine basis to make sure your heart failure is well managed.  If an X-ray, ECG, or other tests were done, you will be told of any new findings that may affect your care.  Call 911  Call 911 if you:  · Become severely short of breath  · Feel lightheaded, or feel like you  might pass out or faint  · Have chest pain or discomfort that is different than usual, the medicines your doctor told you to use for this don't help, or the pain lasts longer than 10 to 15 minutes  · You suddenly develop a rapid heart rate  When to seek medical advice  The following may be signs that your heart failure is getting worse. Call your healthcare provider right away if any of these happen:  · Sudden weight gain. This means 3 or more pounds in one day, or 5 or more pounds in 1 week.  · Trouble breathing not related to being active  · New or increased swelling of your legs or ankles  · Swelling or pain in your abdomen  · Breathing trouble at night. This means waking up short of breath or needing more pillows to breathe.  · Frequent coughing that doesnt go away  · Feeling much more tired than usual  Date Last Reviewed: 1/4/2016  © 4665-0084 [employer]. [address] [place] [address]. All rights reserved. This information is not intended as a substitute for professional medical care. Always follow your healthcare professional's instructions.          Coping with Heart Failure  Its normal to feel sad or down at times when youre living with heart failure. Some medicines can also affect your mood. Following your treatment plan may seem difficult at times. If you feel overwhelmed, just focus on one day at a time. Dont be afraid to ask others for help when you need it.    Ways to feel better  Try not to withdraw from family and friends, even if you are finding it hard to talk to them. They can still be a good source of support. To feel better, you can also:  · Spend time doing things you enjoy. This may include participating in a favorite hobby, meditating, praying, or spending time with people you care about. Find activities that make you happy and make those a priority.  · Share what you learn about heart failure with the people in your life. Invite family members along when you  visit your healthcare provider. This will help you feel supported as well as help you discuss the care plan you've agreed upon with your doctor.  · Think about joining a support group for people with heart failure. It may be easier to talk to people who know firsthand what youre going through. They can offer advice and share stories. You may want to ask loved ones to join you for a meeting.  Asking for help  Having heart failure doesnt mean that you have to feel bad all the time. Consider talking to your healthcare provider or a therapist if:  · You feel worthless or helpless, or are thinking about suicide. These are warning signs of depression. Treatment can help you feel better. When depression is under control, your overall health may also improve.  · You feel anxious about what will happen to your loved ones if your health gets worse. Taking care of legal arrangements, such as a living will and durable power of , can help you feel more secure about the future.  · Social support helps alleviate stress and helps your stick with your healthy lifestyle changes. Without social support, you may end up back in the hospital.  Date Last Reviewed: 3/20/2016  © 4652-6024 [employer]. [address] [place] [address], [place] [address]. All rights reserved. This information is not intended as a substitute for professional medical care. Always follow your healthcare professional's instructions.

## 2018-10-16 ENCOUNTER — OFFICE VISIT (OUTPATIENT)
Dept: OBSTETRICS AND GYNECOLOGY | Facility: CLINIC | Age: 63
End: 2018-10-16
Payer: MEDICAID

## 2018-10-16 VITALS
SYSTOLIC BLOOD PRESSURE: 110 MMHG | WEIGHT: 195.56 LBS | HEIGHT: 62 IN | TEMPERATURE: 99 F | DIASTOLIC BLOOD PRESSURE: 62 MMHG | BODY MASS INDEX: 35.99 KG/M2

## 2018-10-16 DIAGNOSIS — Z01.419 WELL WOMAN EXAM WITH ROUTINE GYNECOLOGICAL EXAM: Primary | ICD-10-CM

## 2018-10-16 DIAGNOSIS — Z12.31 VISIT FOR SCREENING MAMMOGRAM: ICD-10-CM

## 2018-10-16 PROCEDURE — 99396 PREV VISIT EST AGE 40-64: CPT | Mod: S$PBB,,, | Performed by: OBSTETRICS & GYNECOLOGY

## 2018-10-16 PROCEDURE — 99213 OFFICE O/P EST LOW 20 MIN: CPT | Mod: PBBFAC | Performed by: OBSTETRICS & GYNECOLOGY

## 2018-10-16 PROCEDURE — 99999 PR PBB SHADOW E&M-EST. PATIENT-LVL III: CPT | Mod: PBBFAC,,, | Performed by: OBSTETRICS & GYNECOLOGY

## 2018-10-16 PROCEDURE — 88175 CYTOPATH C/V AUTO FLUID REDO: CPT

## 2018-10-16 NOTE — PROGRESS NOTES
"  Subjective:       Patient ID: Charu Oswald is a 63 y.o. female.    Chief Complaint:  Gynecologic Exam (Last pap was 17)      History of Present Illness  HPI  Annual Exam-Postmenopausal  Patient presents for annual exam. The patient has no complaints today except for a skin rash under her breasts and in her groin . The patient is NOT currently sexually active. GYN screening history: last pap: approximate date 2017 and was normal and last mammogram: patient has never had a mammogram. The patient is not taking hormone replacement therapy. Patient denies post-menopausal vaginal bleeding. The patient wears seatbelts: yes. The patient participates in regular exercise: no. Has the patient ever been transfused or tattooed?: yes. The patient reports that there is not domestic violence in her life.    Patient requests a cream for her rash.  Her PCP had prescribed an antifungal cream.  But "it does not work!"       GYN & OB History  No LMP recorded. Patient is postmenopausal.   Date of Last Pap: 2017    OB History    Para Term  AB Living   1       1     SAB TAB Ectopic Multiple Live Births   1              # Outcome Date GA Lbr Guy/2nd Weight Sex Delivery Anes PTL Lv   1 SAB                 Past Medical History:   Diagnosis Date    Asthma     Congestive heart failure     Diabetes mellitus     Type II    Hyperlipidemia     Pacemaker     Pedal edema        Past Surgical History:   Procedure Laterality Date    Cardiac bypass      CARDIAC PACEMAKER PLACEMENT         Family History   Problem Relation Age of Onset    Heart disease Father     Heart disease Mother     Cancer Mother         Bone and breast    Breast cancer Mother 50    Diabetes Brother     Diabetes Sister        Social History     Socioeconomic History    Marital status: Significant Other     Spouse name: None    Number of children: None    Years of education: None    Highest education level: None   Social Needs "    Financial resource strain: None    Food insecurity - worry: None    Food insecurity - inability: None    Transportation needs - medical: None    Transportation needs - non-medical: None   Occupational History    None   Tobacco Use    Smoking status: Former Smoker    Smokeless tobacco: Never Used   Substance and Sexual Activity    Alcohol use: No    Drug use: No    Sexual activity: No     Partners: Male   Other Topics Concern    None   Social History Narrative    Together since 4/2004    He is a     She is a CNA    They no longer sexually-active       Current Outpatient Medications   Medication Sig Dispense Refill    albuterol (PROVENTIL) 2.5 mg /3 mL (0.083 %) nebulizer solution USE 1 VAIL IN NEBULIZER EVERY 4 TO 6 HOUR AS NEEDED SOV  5    amiodarone (PACERONE) 200 MG Tab TAKE 1 TABLET BY MOUTH IN THE MORNING AND A HALF TABLET BY MOUTH IN THE EVENING  5    carvedilol (COREG) 6.25 MG tablet TK 1 T PO  BID  6    clopidogrel (PLAVIX) 75 mg tablet Take 75 mg by mouth once daily.  6    COMBIVENT RESPIMAT  mcg/actuation inhaler 1 puff every 6 (six) hours as needed.  6    furosemide (LASIX) 40 MG tablet 1 TABLET BY MOUTH DAILY MAY TAKE TAB IN THE EVENING IF FOR SHORTNESS OF BREATH OR 5LB WEIGHT GAIN  5    levothyroxine (SYNTHROID) 50 MCG tablet Take 50 mcg by mouth once daily.  6    montelukast (SINGULAIR) 10 mg tablet Take 10 mg by mouth once daily.  6    omeprazole (PRILOSEC) 20 MG capsule TK ONE C PO  D  0    potassium chloride SA (K-DUR,KLOR-CON) 10 MEQ tablet Take 10 mEq by mouth every other day.  2    pravastatin (PRAVACHOL) 10 MG tablet TK 1 T PO QHS  4    predniSONE (DELTASONE) 20 MG tablet TK 3 TS PO ONCE D FOR 4 DAYS  0    TRADJENTA 5 mg Tab tablet Take 5 mg by mouth once daily.  2     No current facility-administered medications for this visit.        Review of patient's allergies indicates:   Allergen Reactions    Nut - unspecified Shortness Of Breath        Review of Systems  Review of Systems   Constitutional: Positive for fatigue. Negative for activity change, appetite change, chills, fever and unexpected weight change.   HENT: Negative for mouth sores.    Respiratory: Negative for cough, shortness of breath and wheezing.    Cardiovascular: Negative for chest pain and palpitations.   Gastrointestinal: Negative for abdominal pain, bloating, blood in stool, constipation, nausea and vomiting.   Endocrine: Negative for diabetes and hot flashes.   Genitourinary: Negative for dyspareunia, dysuria, frequency, hematuria, menorrhagia, menstrual problem, pelvic pain, urgency, vaginal bleeding, vaginal discharge, vaginal pain, dysmenorrhea, urinary incontinence, postcoital bleeding and vaginal odor.   Musculoskeletal: Negative for back pain and myalgias.   Skin:  Positive for rash.   Neurological: Negative for seizures and headaches.   Psychiatric/Behavioral: Negative for depression and sleep disturbance. The patient is not nervous/anxious.    Breast: Positive for skin changes.Negative for breast mass, breast pain and nipple discharge          Objective:    Physical Exam:   Constitutional: She appears well-developed and well-nourished. No distress.    HENT:   Head: Normocephalic and atraumatic.    Eyes: EOM are normal.    Neck: Normal range of motion.     Pulmonary/Chest: Effort normal. No respiratory distress.   Breasts: Non-tender, no engorgement, no masses, no retraction, no discharge. Negative for lymphadenopathy.         Abdominal: Soft. She exhibits no distension. There is no tenderness. There is no rebound and no guarding.     Genitourinary: Vagina normal and uterus normal. No vaginal discharge found.   Genitourinary Comments: Significant atrophy.  Vulva without any obvious lesions.  Vaginal vault with good support.  Minimal white discharge noted.  No obvious lesion.  Cervix is without any cervical motion tenderness.  No obvious lesion.  Uterus is small,  non-tender, normal contour.  Adnexa is without any masses or tenderness.           Musculoskeletal: Normal range of motion.       Neurological: She is alert.    Skin: Skin is warm and dry.   Acanthosis nigricans under breasts, behind at nape of neck, in groin area.    Psychiatric: She has a normal mood and affect.          Assessment:        1. Well woman exam with routine gynecological exam    2. Visit for screening mammogram             Plan:          I have discussed with the patient her condition.  Monthly breast examination was instructed, discussed, and encouraged.  Patient was encouraged to consume a low-calorie, low fat diet, and to increase of physical activity.  Healthy habits encouraged.  A Pap smear was performed.  Mammogram was ordered because of the combination of her age and risk factors.  Gonorrhea and Chlamydia testing not performed.  HIV test not ordered.  Patient is to continue her medications as prescribed.  She will come back to see me in one year for her annual visit.  She can come back to see me sooner as necessary.  All of her questions were answered appropriately to her satisfaction.     We discussed the significance of her skin rash.  They were most likely manifestations of her diabetes and possible metabolic syndrome.  I told her that weight loss and exercise with better control of her diabetes might help lightening up her rash.  But they might not go away.  She seemed to understand.

## 2019-01-08 ENCOUNTER — HOSPITAL ENCOUNTER (EMERGENCY)
Facility: HOSPITAL | Age: 64
Discharge: HOME | End: 2019-01-08
Attending: INTERNAL MEDICINE
Payer: MEDICAID

## 2019-01-08 VITALS
HEIGHT: 62 IN | HEART RATE: 66 BPM | SYSTOLIC BLOOD PRESSURE: 127 MMHG | BODY MASS INDEX: 36.44 KG/M2 | TEMPERATURE: 98 F | RESPIRATION RATE: 20 BRPM | DIASTOLIC BLOOD PRESSURE: 72 MMHG | WEIGHT: 198 LBS | OXYGEN SATURATION: 100 %

## 2019-01-08 DIAGNOSIS — R05.9 COUGH: ICD-10-CM

## 2019-01-08 DIAGNOSIS — R06.02 SHORTNESS OF BREATH: ICD-10-CM

## 2019-01-08 DIAGNOSIS — I50.9 CHF (CONGESTIVE HEART FAILURE): ICD-10-CM

## 2019-01-08 DIAGNOSIS — R06.2 WHEEZING: ICD-10-CM

## 2019-01-08 LAB
CTP QC/QA: YES
FLUAV AG NPH QL: NEGATIVE
FLUBV AG NPH QL: NEGATIVE
POC B-TYPE NATRIURETIC PEPTIDE: 1670 PG/ML (ref 0–100)
POC CARDIAC TROPONIN I: 0 NG/ML
POCT GLUCOSE: 153 MG/DL (ref 70–110)
SAMPLE: NORMAL

## 2019-01-08 PROCEDURE — 94640 AIRWAY INHALATION TREATMENT: CPT | Mod: ER

## 2019-01-08 PROCEDURE — 25000242 PHARM REV CODE 250 ALT 637 W/ HCPCS: Mod: ER | Performed by: INTERNAL MEDICINE

## 2019-01-08 PROCEDURE — 99283 EMERGENCY DEPT VISIT LOW MDM: CPT | Mod: 25,ER

## 2019-01-08 PROCEDURE — 85025 COMPLETE CBC W/AUTO DIFF WBC: CPT | Mod: ER

## 2019-01-08 PROCEDURE — 96372 THER/PROPH/DIAG INJ SC/IM: CPT | Mod: ER

## 2019-01-08 PROCEDURE — 87804 INFLUENZA ASSAY W/OPTIC: CPT | Mod: ER

## 2019-01-08 PROCEDURE — 25000003 PHARM REV CODE 250: Mod: ER | Performed by: INTERNAL MEDICINE

## 2019-01-08 PROCEDURE — 63600175 PHARM REV CODE 636 W HCPCS: Mod: ER | Performed by: INTERNAL MEDICINE

## 2019-01-08 PROCEDURE — 83880 ASSAY OF NATRIURETIC PEPTIDE: CPT | Mod: ER

## 2019-01-08 PROCEDURE — 94760 N-INVAS EAR/PLS OXIMETRY 1: CPT | Mod: ER

## 2019-01-08 RX ORDER — DOXYCYCLINE HYCLATE 100 MG
100 TABLET ORAL
Status: COMPLETED | OUTPATIENT
Start: 2019-01-08 | End: 2019-01-08

## 2019-01-08 RX ORDER — PREDNISONE 20 MG/1
60 TABLET ORAL
Status: COMPLETED | OUTPATIENT
Start: 2019-01-08 | End: 2019-01-08

## 2019-01-08 RX ORDER — FUROSEMIDE 10 MG/ML
40 INJECTION INTRAMUSCULAR; INTRAVENOUS
Status: COMPLETED | OUTPATIENT
Start: 2019-01-08 | End: 2019-01-08

## 2019-01-08 RX ORDER — FUROSEMIDE 10 MG/ML
40 INJECTION INTRAMUSCULAR; INTRAVENOUS
Status: DISCONTINUED | OUTPATIENT
Start: 2019-01-08 | End: 2019-01-08

## 2019-01-08 RX ORDER — IPRATROPIUM BROMIDE 0.5 MG/2.5ML
0.5 SOLUTION RESPIRATORY (INHALATION) ONCE
Status: COMPLETED | OUTPATIENT
Start: 2019-01-08 | End: 2019-01-08

## 2019-01-08 RX ORDER — LEVALBUTEROL 1.25 MG/.5ML
1.25 SOLUTION, CONCENTRATE RESPIRATORY (INHALATION) ONCE
Status: COMPLETED | OUTPATIENT
Start: 2019-01-08 | End: 2019-01-08

## 2019-01-08 RX ADMIN — IPRATROPIUM BROMIDE 0.5 MG: 0.5 SOLUTION RESPIRATORY (INHALATION) at 12:01

## 2019-01-08 RX ADMIN — DOXYCYCLINE HYCLATE 100 MG: 100 TABLET, COATED ORAL at 01:01

## 2019-01-08 RX ADMIN — LEVALBUTEROL HYDROCHLORIDE 1.25 MG: 1.25 SOLUTION, CONCENTRATE RESPIRATORY (INHALATION) at 12:01

## 2019-01-08 RX ADMIN — PREDNISONE 60 MG: 20 TABLET ORAL at 01:01

## 2019-01-08 RX ADMIN — FUROSEMIDE 40 MG: 10 INJECTION, SOLUTION INTRAVENOUS at 01:01

## 2019-01-08 NOTE — ED PROVIDER NOTES
Encounter Date: 1/8/2019       History     Chief Complaint   Patient presents with    cough with sob     pt c/o intermitt non productive cough with sob , pt also c/o sob when lying flat, hx of chf, denies cp or sob at this time, reports symptoms x3 days, denies fever or other complaints, use of inhaler approx 30 mins      63-year-old female presents to the emergency department complaining of shortness of breath/cough upon laying supine at night, progressively worsening over the past few days.  She denies chest pain, fever/chills, nausea/vomiting.  She has a past medical history significant for asthma/CHF/diabetes.      The history is provided by the patient. No  was used.   Cough   This is a recurrent problem. The current episode started several days ago. The cough is non-productive. There has been no fever. Associated symptoms include shortness of breath and wheezing. Pertinent negatives include no chest pain, no chills and no myalgias. She is not a smoker (States she is exposed to smoke in her home).     Review of patient's allergies indicates:   Allergen Reactions    Nut - unspecified Shortness Of Breath     Past Medical History:   Diagnosis Date    Asthma     Congestive heart failure     Diabetes mellitus     Type II    Hyperlipidemia     Pacemaker     Pedal edema      Past Surgical History:   Procedure Laterality Date    Cardiac bypass  1994    CARDIAC PACEMAKER PLACEMENT  2016     Family History   Problem Relation Age of Onset    Heart disease Father     Heart disease Mother     Cancer Mother         Bone and breast    Breast cancer Mother 50    Diabetes Brother     Diabetes Sister      Social History     Tobacco Use    Smoking status: Former Smoker    Smokeless tobacco: Never Used   Substance Use Topics    Alcohol use: No    Drug use: No     Review of Systems   Constitutional: Negative for chills and fever.   Respiratory: Positive for cough, shortness of breath and  wheezing. Negative for chest tightness and stridor.    Cardiovascular: Negative for chest pain and palpitations.   Gastrointestinal: Negative for abdominal pain.   Musculoskeletal: Negative for myalgias.   All other systems reviewed and are negative.      Physical Exam     Initial Vitals [01/08/19 0014]   BP Pulse Resp Temp SpO2   128/76 73 18 97.6 °F (36.4 °C) 98 %      MAP       --         Physical Exam    Nursing note and vitals reviewed.  Constitutional: She appears well-developed and well-nourished.   HENT:   Head: Normocephalic and atraumatic.   Right Ear: External ear normal.   Left Ear: External ear normal.   Eyes: EOM are normal.   Neck: Normal range of motion. Neck supple.   Cardiovascular: Normal rate and regular rhythm.   Pulmonary/Chest: Breath sounds normal. No respiratory distress.   Abdominal: Soft. Bowel sounds are normal.   Musculoskeletal: Normal range of motion.   Neurological: She is alert and oriented to person, place, and time. She has normal strength.   Skin: Skin is warm and dry.   Psychiatric: She has a normal mood and affect. Thought content normal.         ED Course   Procedures  Labs Reviewed   POCT GLUCOSE - Abnormal; Notable for the following components:       Result Value    POCT Glucose 153 (*)     All other components within normal limits   POCT B-TYPE NATRIURETIC PEPTIDE (BNP) - Abnormal; Notable for the following components:    POC B-Type Natriuretic Peptide 1670 (*)     All other components within normal limits   POCT CBC   POCT INFLUENZA A/B   POCT GLUCOSE, HAND-HELD DEVICE   POCT B-TYPE NATRIURETIC PEPTIDE (BNP)     EKG Readings: (Independently Interpreted)   Rhythm: Normal Sinus Rhythm. Heart Rate: 64. Conduction: LBBB. ST Segments: Normal ST Segments.       Imaging Results          X-Ray Chest PA And Lateral (Final result)  Result time 01/08/19 00:40:08    Final result by Marycruz Stearns MD (01/08/19 00:40:08)                 Impression:      Cardiomegaly with suspected  pulmonary vascular congestion and mild pulmonary interstitial edema.      Electronically signed by: Marycruz Stearns MD  Date:    01/08/2019  Time:    00:40             Narrative:    EXAMINATION:  XR CHEST PA AND LATERAL    CLINICAL HISTORY:  Cough    TECHNIQUE:  PA and lateral views of the chest were performed.    COMPARISON:  April 2017.    FINDINGS:  Heart is enlarged but stable in size.  Postsurgical changes and sternotomy wires are seen.  Left-sided pacer device is seen.  There is prominence of central pulmonary vasculature with mild increased interstitial attenuation.  No evidence of focal consolidation, pneumothorax, or significant effusion.  No acute osseous abnormality identified.                                 Medical Decision Making:   Initial Assessment:   63-year-old female presents to the emergency department complaining of shortness of breath upon laying supine at night, progressively worsening over the past few days.  She denies chest pain, fever/chills, nausea/vomiting.  She has a history of COPD/CHF/diabetes.    Clinical Tests:   Lab Tests: Ordered and Reviewed  Radiological Study: Ordered and Reviewed  ED Management:  Chest x-ray reveals mild pulmonary edema. BNP was elevated.  CBC is within normal limits as well as CMP. Patient states she feels much better after steroids, Lasix and nebulizer treatment.  Patient was given instructions for CHF and asthma                      Clinical Impression:   Diagnoses of Shortness of breath, Wheezing, Cough, and CHF (congestive heart failure) were pertinent to this visit.      Disposition:   Disposition: Discharged  Condition: Stable                        Ivan Montoya MD  01/08/19 0248

## 2019-03-13 ENCOUNTER — HOSPITAL ENCOUNTER (EMERGENCY)
Facility: HOSPITAL | Age: 64
Discharge: HOME | End: 2019-03-13
Attending: EMERGENCY MEDICINE
Payer: MEDICAID

## 2019-03-13 VITALS
WEIGHT: 190 LBS | DIASTOLIC BLOOD PRESSURE: 83 MMHG | RESPIRATION RATE: 20 BRPM | SYSTOLIC BLOOD PRESSURE: 146 MMHG | HEIGHT: 62 IN | HEART RATE: 64 BPM | BODY MASS INDEX: 34.96 KG/M2 | OXYGEN SATURATION: 100 % | TEMPERATURE: 98 F

## 2019-03-13 DIAGNOSIS — J40 BRONCHITIS: ICD-10-CM

## 2019-03-13 DIAGNOSIS — R06.2 WHEEZE: Primary | ICD-10-CM

## 2019-03-13 LAB — POCT GLUCOSE: 96 MG/DL (ref 70–110)

## 2019-03-13 PROCEDURE — 63600175 PHARM REV CODE 636 W HCPCS: Mod: ER | Performed by: NURSE PRACTITIONER

## 2019-03-13 PROCEDURE — 94640 AIRWAY INHALATION TREATMENT: CPT | Mod: ER

## 2019-03-13 PROCEDURE — 25000242 PHARM REV CODE 250 ALT 637 W/ HCPCS: Mod: ER | Performed by: NURSE PRACTITIONER

## 2019-03-13 PROCEDURE — 99283 EMERGENCY DEPT VISIT LOW MDM: CPT | Mod: 25,ER

## 2019-03-13 PROCEDURE — 96372 THER/PROPH/DIAG INJ SC/IM: CPT | Mod: ER

## 2019-03-13 RX ORDER — IPRATROPIUM BROMIDE AND ALBUTEROL SULFATE 2.5; .5 MG/3ML; MG/3ML
3 SOLUTION RESPIRATORY (INHALATION)
Status: COMPLETED | OUTPATIENT
Start: 2019-03-13 | End: 2019-03-13

## 2019-03-13 RX ORDER — IPRATROPIUM BROMIDE AND ALBUTEROL SULFATE 2.5; .5 MG/3ML; MG/3ML
3 SOLUTION RESPIRATORY (INHALATION) EVERY 6 HOURS PRN
Qty: 1 BOX | Refills: 0 | Status: ON HOLD | OUTPATIENT
Start: 2019-03-13 | End: 2019-04-22 | Stop reason: SDUPTHER

## 2019-03-13 RX ORDER — METHYLPREDNISOLONE SOD SUCC 125 MG
80 VIAL (EA) INJECTION
Status: COMPLETED | OUTPATIENT
Start: 2019-03-13 | End: 2019-03-13

## 2019-03-13 RX ADMIN — IPRATROPIUM BROMIDE AND ALBUTEROL SULFATE 3 ML: .5; 3 SOLUTION RESPIRATORY (INHALATION) at 12:03

## 2019-03-13 RX ADMIN — METHYLPREDNISOLONE SODIUM SUCCINATE 80 MG: 125 INJECTION, POWDER, FOR SOLUTION INTRAMUSCULAR; INTRAVENOUS at 12:03

## 2019-03-13 NOTE — ED PROVIDER NOTES
Encounter Date: 3/13/2019    SCRIBE #1 NOTE: I, Santos Zhang, am scribing for, and in the presence of,  Toussaintussaint Battley, FNP. I have scribed the following portions of the note - Other sections scribed: HPI, ROS, PE.       History     Chief Complaint   Patient presents with    Cough     pt  c/o productive white mucous cough x's 5 days. Denies fever     This is a 63 y.o. female, with DM,  who presents to the ED with a chief complaint of coughing with white sputum that began five days ago.   Pt endorses wheezing.  Pt has had similar symptoms in the past and was previously dx 'd with bronchitis.  Denies tobacco use.  Denies fever, chills, nausea, emesis, or diarrhea.       The history is provided by the patient. No  was used.   Cough   This is a new problem. The current episode started several days ago (5 days ago ). The problem has been unchanged. There has been no fever. Associated symptoms include wheezing. Pertinent negatives include no chest pain, no chills and no shortness of breath. She has tried nothing for the symptoms. She is not a smoker.     Review of patient's allergies indicates:   Allergen Reactions    Nut - unspecified Shortness Of Breath     Past Medical History:   Diagnosis Date    Asthma     Congestive heart failure     Diabetes mellitus     Type II    Hyperlipidemia     Pacemaker     Pedal edema      Past Surgical History:   Procedure Laterality Date    Cardiac bypass  1994    CARDIAC PACEMAKER PLACEMENT  2016     Family History   Problem Relation Age of Onset    Heart disease Father     Heart disease Mother     Cancer Mother         Bone and breast    Breast cancer Mother 50    Diabetes Brother     Diabetes Sister      Social History     Tobacco Use    Smoking status: Former Smoker    Smokeless tobacco: Never Used   Substance Use Topics    Alcohol use: No    Drug use: No     Review of Systems   Constitutional: Negative for chills and fever.   Respiratory:  Positive for cough and wheezing. Negative for shortness of breath.    Cardiovascular: Negative for chest pain.   Gastrointestinal: Negative for diarrhea, nausea and vomiting.   All other systems reviewed and are negative.      Physical Exam     Initial Vitals [03/13/19 1148]   BP Pulse Resp Temp SpO2   107/65 66 19 97.9 °F (36.6 °C) 100 %      MAP       --         Physical Exam    Nursing note and vitals reviewed.  Constitutional: She appears well-developed and well-nourished.   HENT:   Head: Normocephalic and atraumatic.   Eyes: Conjunctivae are normal.   Neck: Normal range of motion. Neck supple.   Cardiovascular: Normal rate, regular rhythm, normal heart sounds and intact distal pulses. Exam reveals no gallop and no friction rub.    No murmur heard.  Pulmonary/Chest: Effort normal. No respiratory distress. She has wheezes. She has no rhonchi. She has no rales. She exhibits no tenderness.   Abdominal: Soft. Bowel sounds are normal. She exhibits no distension and no mass. There is no tenderness. There is no rebound and no guarding.   Musculoskeletal: Normal range of motion.   Neurological: She is alert and oriented to person, place, and time.   Skin: Skin is warm and dry.   Psychiatric: She has a normal mood and affect.         ED Course   Procedures  Labs Reviewed   POCT GLUCOSE, HAND-HELD DEVICE   POCT GLUCOSE          Imaging Results          X-Ray Chest PA And Lateral (Final result)  Result time 03/13/19 12:19:59    Final result by Alexey Rubio MD (03/13/19 12:19:59)                 Impression:      Cardiac device without radiographic evidence of failure or focal consolidation seen.      Electronically signed by: Alexey Rubio MD  Date:    03/13/2019  Time:    12:19             Narrative:    EXAMINATION:  XR CHEST PA AND LATERAL    CLINICAL HISTORY:  Bronchitis, not specified as acute or chronic    TECHNIQUE:  PA and lateral views of the chest were performed.    COMPARISON:  Chest radiograph  01/08/2019    FINDINGS:  No detrimental change.  Postsurgical changes of prior sternotomy presumably for CABG similar to prior.  Left chest single lead cardiac device is unchanged.  Cardiomediastinal silhouette remains midline and prominent without evidence of failure.  Hilar contours are within normal limits.  The lungs are symmetrically well expanded without large consolidation or new focal opacity.  No pleural effusion or pneumothorax.  Osseous structures appear grossly stable without acute process seen.                                 Medical Decision Making:   Initial Assessment:   Wheezing bronchitis  Differential Diagnosis:   Pneumonia  Clinical Tests:   Lab Tests: Ordered and Reviewed       <> Summary of Lab: Glucose within normal limits  Radiological Study: Ordered and Reviewed  ED Management:  Solu-Medrol IM and DuoNeb via inhalation given in the ER.  Upon reassessment patient has no wheezing noted.  Patient will be discharged home with DuoNeb for her nebulizer at home with instructions to follow up with her primary care provider on tomorrow return to the ER as needed if symptoms worsen or fail to improve.  Patient verbalized understanding of discharge instruction treatment plan.            Scribe Attestation:   Scribe #1: I performed the above scribed service and the documentation accurately describes the services I performed. I attest to the accuracy of the note.               Clinical Impression:     1. Wheeze    2. Bronchitis                                  Toussaint Battley III, FNP  03/13/19 3334

## 2019-04-22 ENCOUNTER — HOSPITAL ENCOUNTER (OUTPATIENT)
Facility: HOSPITAL | Age: 64
Discharge: HOME | End: 2019-04-22
Attending: EMERGENCY MEDICINE | Admitting: INTERNAL MEDICINE
Payer: MEDICAID

## 2019-04-22 VITALS
BODY MASS INDEX: 34.8 KG/M2 | DIASTOLIC BLOOD PRESSURE: 68 MMHG | OXYGEN SATURATION: 96 % | TEMPERATURE: 98 F | SYSTOLIC BLOOD PRESSURE: 128 MMHG | HEIGHT: 62 IN | WEIGHT: 189.13 LBS | RESPIRATION RATE: 16 BRPM | HEART RATE: 79 BPM

## 2019-04-22 DIAGNOSIS — R06.2 WHEEZING: ICD-10-CM

## 2019-04-22 DIAGNOSIS — E87.6 HYPOKALEMIA: ICD-10-CM

## 2019-04-22 DIAGNOSIS — I50.9 ACUTE CONGESTIVE HEART FAILURE, UNSPECIFIED HEART FAILURE TYPE: Primary | ICD-10-CM

## 2019-04-22 DIAGNOSIS — I50.43 ACUTE ON CHRONIC COMBINED SYSTOLIC AND DIASTOLIC CONGESTIVE HEART FAILURE: ICD-10-CM

## 2019-04-22 DIAGNOSIS — R06.02 SOB (SHORTNESS OF BREATH): ICD-10-CM

## 2019-04-22 LAB
ALBUMIN SERPL-MCNC: 3.7 G/DL
ALP SERPL-CCNC: 73 U/L
BILIRUB SERPL-MCNC: 0.5 MG/DL
BILIRUBIN, POC UA: NEGATIVE
BLOOD, POC UA: NEGATIVE
BUN SERPL-MCNC: 25 MG/DL
CALCIUM SERPL-MCNC: 10.3 MG/DL
CHLORIDE SERPL-SCNC: 101 MMOL/L
CLARITY, POC UA: CLEAR
COLOR, POC UA: YELLOW
CREAT SERPL-MCNC: 2.6 MG/DL
GLUCOSE SERPL-MCNC: 162 MG/DL (ref 70–110)
GLUCOSE, POC UA: NEGATIVE
HCO3 UR-SCNC: 35.9 MMOL/L (ref 24–28)
KETONES, POC UA: NEGATIVE
LDH SERPL L TO P-CCNC: 0.69 MMOL/L (ref 0.5–2.2)
LEUKOCYTE EST, POC UA: NEGATIVE
NITRITE, POC UA: NEGATIVE
PCO2 BLDA: 55.5 MMHG (ref 35–45)
PH SMN: 7.42 [PH] (ref 7.35–7.45)
PH UR STRIP: 5.5 [PH]
PO2 BLDA: 14 MMHG (ref 40–60)
POC ALT (SGPT): 18 U/L
POC AST (SGOT): 20 U/L
POC B-TYPE NATRIURETIC PEPTIDE: 1790 PG/ML
POC BE: 9 MMOL/L
POC CARDIAC TROPONIN I: 0.02 NG/ML
POC PTINR: 1.2 (ref 0.9–1.2)
POC PTWBT: 14.6 SEC (ref 9.7–14.3)
POC SATURATED O2: 18 % (ref 95–100)
POC TCO2: 34 MMOL/L
POC TCO2: 38 MMOL/L (ref 24–29)
POCT GLUCOSE: 166 MG/DL (ref 70–110)
POCT GLUCOSE: 173 MG/DL (ref 70–110)
POTASSIUM BLD-SCNC: 3.2 MMOL/L
PROTEIN, POC UA: NEGATIVE
PROTEIN, POC: 7.5 G/DL
SAMPLE: ABNORMAL
SAMPLE: ABNORMAL
SAMPLE: NORMAL
SODIUM BLD-SCNC: 144 MMOL/L
SPECIFIC GRAVITY, POC UA: 1.01
TROPONIN I SERPL DL<=0.01 NG/ML-MCNC: 0.02 NG/ML (ref 0–0.03)
UROBILINOGEN, POC UA: 0.2 E.U./DL

## 2019-04-22 PROCEDURE — 96375 TX/PRO/DX INJ NEW DRUG ADDON: CPT | Mod: ER

## 2019-04-22 PROCEDURE — 82803 BLOOD GASES ANY COMBINATION: CPT | Mod: ER

## 2019-04-22 PROCEDURE — 83036 HEMOGLOBIN GLYCOSYLATED A1C: CPT

## 2019-04-22 PROCEDURE — 63600175 PHARM REV CODE 636 W HCPCS: Performed by: NURSE PRACTITIONER

## 2019-04-22 PROCEDURE — 83880 ASSAY OF NATRIURETIC PEPTIDE: CPT | Mod: ER

## 2019-04-22 PROCEDURE — 63600175 PHARM REV CODE 636 W HCPCS: Mod: ER | Performed by: EMERGENCY MEDICINE

## 2019-04-22 PROCEDURE — 93010 EKG 12-LEAD: ICD-10-PCS | Mod: 76,,, | Performed by: INTERNAL MEDICINE

## 2019-04-22 PROCEDURE — 85025 COMPLETE CBC W/AUTO DIFF WBC: CPT | Mod: ER

## 2019-04-22 PROCEDURE — 25000242 PHARM REV CODE 250 ALT 637 W/ HCPCS: Mod: ER | Performed by: EMERGENCY MEDICINE

## 2019-04-22 PROCEDURE — 84484 ASSAY OF TROPONIN QUANT: CPT | Mod: ER

## 2019-04-22 PROCEDURE — 80053 COMPREHEN METABOLIC PANEL: CPT | Mod: ER

## 2019-04-22 PROCEDURE — 96374 THER/PROPH/DIAG INJ IV PUSH: CPT | Mod: ER

## 2019-04-22 PROCEDURE — 25000003 PHARM REV CODE 250: Performed by: NURSE PRACTITIONER

## 2019-04-22 PROCEDURE — 94640 AIRWAY INHALATION TREATMENT: CPT | Mod: ER

## 2019-04-22 PROCEDURE — 82962 GLUCOSE BLOOD TEST: CPT | Mod: ER

## 2019-04-22 PROCEDURE — 93005 ELECTROCARDIOGRAM TRACING: CPT

## 2019-04-22 PROCEDURE — 25000003 PHARM REV CODE 250: Performed by: EMERGENCY MEDICINE

## 2019-04-22 PROCEDURE — 93005 ELECTROCARDIOGRAM TRACING: CPT | Mod: ER

## 2019-04-22 PROCEDURE — G0378 HOSPITAL OBSERVATION PER HR: HCPCS

## 2019-04-22 PROCEDURE — 25000003 PHARM REV CODE 250: Mod: ER | Performed by: EMERGENCY MEDICINE

## 2019-04-22 PROCEDURE — 99291 CRITICAL CARE FIRST HOUR: CPT | Mod: 25,ER

## 2019-04-22 PROCEDURE — 81003 URINALYSIS AUTO W/O SCOPE: CPT | Mod: ER

## 2019-04-22 PROCEDURE — 85610 PROTHROMBIN TIME: CPT | Mod: ER

## 2019-04-22 PROCEDURE — 84484 ASSAY OF TROPONIN QUANT: CPT

## 2019-04-22 PROCEDURE — 36415 COLL VENOUS BLD VENIPUNCTURE: CPT

## 2019-04-22 PROCEDURE — 93010 ELECTROCARDIOGRAM REPORT: CPT | Mod: ,,, | Performed by: INTERNAL MEDICINE

## 2019-04-22 RX ORDER — FUROSEMIDE 10 MG/ML
40 INJECTION INTRAMUSCULAR; INTRAVENOUS 2 TIMES DAILY
Status: DISCONTINUED | OUTPATIENT
Start: 2019-04-22 | End: 2019-04-22 | Stop reason: HOSPADM

## 2019-04-22 RX ORDER — AMIODARONE HYDROCHLORIDE 200 MG/1
200 TABLET ORAL DAILY
Status: DISCONTINUED | OUTPATIENT
Start: 2019-04-22 | End: 2019-04-22 | Stop reason: HOSPADM

## 2019-04-22 RX ORDER — MONTELUKAST SODIUM 10 MG/1
10 TABLET ORAL DAILY
Status: DISCONTINUED | OUTPATIENT
Start: 2019-04-22 | End: 2019-04-22 | Stop reason: HOSPADM

## 2019-04-22 RX ORDER — IPRATROPIUM BROMIDE AND ALBUTEROL SULFATE 2.5; .5 MG/3ML; MG/3ML
3 SOLUTION RESPIRATORY (INHALATION) ONCE
Status: COMPLETED | OUTPATIENT
Start: 2019-04-22 | End: 2019-04-22

## 2019-04-22 RX ORDER — FAMOTIDINE 20 MG/1
20 TABLET, FILM COATED ORAL 2 TIMES DAILY
Status: DISCONTINUED | OUTPATIENT
Start: 2019-04-22 | End: 2019-04-22 | Stop reason: HOSPADM

## 2019-04-22 RX ORDER — PRAVASTATIN SODIUM 10 MG/1
10 TABLET ORAL NIGHTLY
Status: DISCONTINUED | OUTPATIENT
Start: 2019-04-22 | End: 2019-04-22 | Stop reason: HOSPADM

## 2019-04-22 RX ORDER — PREDNISONE 20 MG/1
20 TABLET ORAL DAILY
Status: DISCONTINUED | OUTPATIENT
Start: 2019-04-22 | End: 2019-04-22 | Stop reason: HOSPADM

## 2019-04-22 RX ORDER — IPRATROPIUM BROMIDE AND ALBUTEROL SULFATE 2.5; .5 MG/3ML; MG/3ML
3 SOLUTION RESPIRATORY (INHALATION) EVERY 4 HOURS
Status: DISCONTINUED | OUTPATIENT
Start: 2019-04-22 | End: 2019-04-22 | Stop reason: HOSPADM

## 2019-04-22 RX ORDER — IPRATROPIUM BROMIDE AND ALBUTEROL SULFATE 2.5; .5 MG/3ML; MG/3ML
3 SOLUTION RESPIRATORY (INHALATION)
Qty: 1 BOX | Refills: 0 | Status: SHIPPED | OUTPATIENT
Start: 2019-04-22

## 2019-04-22 RX ORDER — CARVEDILOL 6.25 MG/1
6.25 TABLET ORAL 2 TIMES DAILY WITH MEALS
Status: DISCONTINUED | OUTPATIENT
Start: 2019-04-22 | End: 2019-04-22 | Stop reason: HOSPADM

## 2019-04-22 RX ORDER — SODIUM CHLORIDE 0.9 % (FLUSH) 0.9 %
10 SYRINGE (ML) INJECTION
Status: DISCONTINUED | OUTPATIENT
Start: 2019-04-22 | End: 2019-04-22 | Stop reason: HOSPADM

## 2019-04-22 RX ORDER — METHYLPREDNISOLONE SOD SUCC 125 MG
125 VIAL (EA) INJECTION
Status: COMPLETED | OUTPATIENT
Start: 2019-04-22 | End: 2019-04-22

## 2019-04-22 RX ORDER — PREDNISONE 20 MG/1
20 TABLET ORAL DAILY
Qty: 5 TABLET | Refills: 0 | Status: SHIPPED | OUTPATIENT
Start: 2019-04-22 | End: 2019-04-27

## 2019-04-22 RX ORDER — CLOPIDOGREL BISULFATE 75 MG/1
75 TABLET ORAL DAILY
Status: DISCONTINUED | OUTPATIENT
Start: 2019-04-22 | End: 2019-04-22 | Stop reason: HOSPADM

## 2019-04-22 RX ORDER — ACETAMINOPHEN 325 MG/1
650 TABLET ORAL EVERY 8 HOURS PRN
Status: DISCONTINUED | OUTPATIENT
Start: 2019-04-22 | End: 2019-04-22 | Stop reason: HOSPADM

## 2019-04-22 RX ORDER — FUROSEMIDE 10 MG/ML
40 INJECTION INTRAMUSCULAR; INTRAVENOUS
Status: COMPLETED | OUTPATIENT
Start: 2019-04-22 | End: 2019-04-22

## 2019-04-22 RX ORDER — LEVOTHYROXINE SODIUM 50 UG/1
50 TABLET ORAL
Status: DISCONTINUED | OUTPATIENT
Start: 2019-04-22 | End: 2019-04-22 | Stop reason: HOSPADM

## 2019-04-22 RX ORDER — POTASSIUM CHLORIDE 20 MEQ/1
40 TABLET, EXTENDED RELEASE ORAL
Status: COMPLETED | OUTPATIENT
Start: 2019-04-22 | End: 2019-04-22

## 2019-04-22 RX ORDER — ONDANSETRON 8 MG/1
8 TABLET, ORALLY DISINTEGRATING ORAL EVERY 8 HOURS PRN
Status: DISCONTINUED | OUTPATIENT
Start: 2019-04-22 | End: 2019-04-22 | Stop reason: HOSPADM

## 2019-04-22 RX ADMIN — CLOPIDOGREL BISULFATE 75 MG: 75 TABLET ORAL at 02:04

## 2019-04-22 RX ADMIN — IPRATROPIUM BROMIDE AND ALBUTEROL SULFATE 3 ML: .5; 3 SOLUTION RESPIRATORY (INHALATION) at 09:04

## 2019-04-22 RX ADMIN — PREDNISONE 20 MG: 20 TABLET ORAL at 02:04

## 2019-04-22 RX ADMIN — IPRATROPIUM BROMIDE AND ALBUTEROL SULFATE 3 ML: .5; 3 SOLUTION RESPIRATORY (INHALATION) at 07:04

## 2019-04-22 RX ADMIN — FUROSEMIDE 40 MG: 10 INJECTION, SOLUTION INTRAMUSCULAR; INTRAVENOUS at 08:04

## 2019-04-22 RX ADMIN — POTASSIUM CHLORIDE 40 MEQ: 1500 TABLET, EXTENDED RELEASE ORAL at 08:04

## 2019-04-22 RX ADMIN — METHYLPREDNISOLONE SODIUM SUCCINATE 125 MG: 125 INJECTION, POWDER, FOR SOLUTION INTRAMUSCULAR; INTRAVENOUS at 09:04

## 2019-04-22 RX ADMIN — FAMOTIDINE 20 MG: 20 TABLET ORAL at 02:04

## 2019-04-22 RX ADMIN — CARVEDILOL 6.25 MG: 6.25 TABLET, FILM COATED ORAL at 02:04

## 2019-04-22 RX ADMIN — MONTELUKAST SODIUM 10 MG: 10 TABLET, FILM COATED ORAL at 02:04

## 2019-04-22 NOTE — NURSING
Report received from Cassandra, nurse at [facility], ED. Was informed [facility] present to transport pt to our facility. Awaiting pt's arrival to room 325.

## 2019-04-22 NOTE — HOSPITAL COURSE
* CHF (congestive heart failure)  Patient admitted for acute on chronic combined systolic diastolic heart failure and asthma exacerbation.   At [place] ED, BNP 1790 (1670 3 months ago). Troponin negative. CXR showed vascular congestion and increase interstitial parenchymal. Duoneb, IV steriod, IV lasix 80 mg given at [place] ED.  On arrival to observation, patient report breathing at baseline and wants to go home. BLE improved and breathing comfortably on RA. RA O2 saturation 98% ambulating. Patient stable for discharge home with close follow up Cardiology Dr. Pina. Instruct log daily weight and blood pressure. Will discharge home on prednisone and continue same home lasix 40 mg BID. See discharge home medication list below.

## 2019-04-22 NOTE — PLAN OF CARE
[facility] CASE MANAGEMENT                  WRITTEN DISCHARGE INFORMATION      APPOINTMENTS AND RESOURCES TO HELP YOU MANAGE YOUR CARE AT HOME BASED ON YOUR PREFERENCES:  Follow-up Information     Darin Pina MD On 5/1/2019.    Specialty:  Cardiology  Why:  out patient services: 3:15PM follow up from the hospital  Contact information:  [address]   [address]  [facility] [address]  [phone]             Clyde Gale NP On 4/26/2019.    Specialty:  Internal Medicine  Why:  out patient services: 9:15 AM follow up from the hospital  Contact information:  [address] [place] [address]  [phone]               Healthy Living Instructions to HELP MANAGE YOUR CARE AT HOME:  Things You are responsible for:  1.    Getting your prescriptions filled   2.    Taking your medications as directed, DO NOT MISS ANY DOSES!  3.    Following the diet and exercise recommended by your doctor  4.    Going to your follow-up doctor appointment. This is important because it allows the doctor to monitor your progress and determine if any changes need to made to your treatment plan.  5. If you have any questions about MANAGING YOUR CARE AT HOME Call the Nurse Care Line for 24/7 Assistance [phone]   please answer any calls you may receive from [facility]. We want to continue to support you as you manage your healthcare needs. [facility] is happy to have the opportunity to serve you.      Thank you for choosing [facility] for your healthcare needs!  Your [facility] Case Management Team,

## 2019-04-22 NOTE — HPI
Mrs. Oswald is a 64 yo AAF with history for hypertension, hyperlipidemia, asthma, CAD sp CABG 1994, Ischemic cardiomyopathy EF 25% diastolic dysfunction (per [facility] EF 26% 11/2015) SP AICD, PAD, and CKD stage 4 Who was transferred from [place] ED to [facility] for acute on chronic combined systolic diastolic heart failure and asthma exacerbation. Patient came to ED for wheezing x 1 week and shortness started 2 am today. Used home duoneb without relief. Reports compliance with home diuretics and medication. Denies headaches, dizziness, change in vision, chest pain, palpitations, diaphoresis, orthopnea, PND, abdominal pain, nausea, vomiting, or extremities weakness/numbness. Denies fever or chills.     At [place] ED, BNP 1790 (1670 3 months ago). Troponin negative. CXR showed vascular congestion and increase interstitial parenchymal. Duoneb, IV steriod, IV lasix 80 mg.  On arrival to observation, patient report breathing at baseline and wants to go home.

## 2019-04-22 NOTE — PROGRESS NOTES
TN informed OBS nurse, Soraya that pt is cleared for discharge from [facility]..Brittany De Los Santos RN, BSN, [facility]N [facility]  4/22/2019

## 2019-04-22 NOTE — PROGRESS NOTES
[facility]                  WRITTEN DISCHARGE INFORMATION    APPOINTMENTS AND RESOURCES TO HELP YOU MANAGE YOUR CARE AT HOME BASED ON YOUR PREFERENCES:  Follow-up Information     Darin Pina MD On 5/1/2019.    Specialty:  Cardiology  Why:  out patient services: 3:15PM follow up from the hospital  Contact information:  [address]   [address]  [facility] [place] [place] [address]  [phone]             Clyde Gale NP On 4/26/2019.    Specialty:  Internal Medicine  Why:  out patient services: 9:15 AM follow up from the hospital  Contact information:  [address] [place] [address]  [phone]               Healthy Living Instructions to HELP MANAGE YOUR CARE AT HOME:  Things You are responsible for:  1.    Getting your prescriptions filled   2.    Taking your medications as directed, DO NOT MISS ANY DOSES!  3.    Following the diet and exercise recommended by your doctor  4.    Going to your follow-up doctor appointment. This is important because it allows the doctor to monitor your progress and determine if any changes need to made to your treatment plan.  5. If you have any questions about MANAGING YOUR CARE AT HOME Call the Nurse Care Line for 24/7 Assistance [phone]     Please answer any calls you may receive from [facility]. We want to continue to support you as you manage your healthcare needs. [facility] is happy to have the opportunity to serve you.      Thank you for choosing [facility] for your healthcare needs!  Your [facility] Case Management Team,..Brittany De Los Santos RN, BSN, [facility] [phone]  4/22/2019

## 2019-04-22 NOTE — DISCHARGE SUMMARY
[facility] Hospital Medicine  Discharge Summary      Patient Name: Charu Oswald  MRN: [id number]  Admission Date: 4/22/2019  Hospital Length of Stay: 0 days  Discharge Date and Time:  04/22/2019 1:58 PM  Attending Physician: Pati Sher MD   Discharging Provider: Harmony Hampton NP  Primary Care Provider: Gerda Carter MD      HPI:   Mrs. Oswald is a 64 yo AAF with history for hypertension, hyperlipidemia, asthma, CAD sp CABG 1994, Ischemic cardiomyopathy EF 25% diastolic dysfunction (per [facility] EF 26% 11/2015) SP AICD, PAD, and CKD stage 4 Who was transferred from [place] ED to [facility] for acute on chronic combined systolic diastolic heart failure and asthma exacerbation. Patient came to ED for wheezing x 1 week and shortness started 2 am today. Used home duoneb without relief. Reports compliance with home diuretics and medication. Denies headaches, dizziness, change in vision, chest pain, palpitations, diaphoresis, orthopnea, PND, abdominal pain, nausea, vomiting, or extremities weakness/numbness. Denies fever or chills.     At [place] ED, BNP 1790 (1670 3 months ago). Troponin negative. CXR showed vascular congestion and increase interstitial parenchymal. Duoneb, IV steriod, IV lasix 80 mg.  On arrival to observation, patient report breathing at baseline and wants to go home.     * No surgery found *      Hospital Course:   * CHF (congestive heart failure)  Patient admitted for acute on chronic combined systolic diastolic heart failure and asthma exacerbation.   At [place] ED, BNP 1790 (1670 3 months ago). Troponin negative. CXR showed vascular congestion and increase interstitial parenchymal. Duoneb, IV steriod, IV lasix 80 mg given at [place] ED.  On arrival to observation, patient report breathing at baseline and wants to go home. BLE improved and breathing comfortably on RA. RA O2 saturation 98% ambulating. Patient stable for discharge home with close follow up Cardiology  Dr. Pina. Instruct log daily weight and blood pressure. Will discharge home on prednisone and continue same home lasix 40 mg BID. See discharge home medication list below.            Consults:       Final Active Diagnoses:    Diagnosis Date Noted POA    PRINCIPAL PROBLEM:  CHF (congestive heart failure) [I50.9]  Yes    Asthma [J45.909]  Yes      Problems Resolved During this Admission:       Discharged Condition: good    Disposition: Home or Self Care    Follow Up:  Follow-up Information     Darin Pina MD On 5/1/2019.    Specialty:  Cardiology  Why:  out patient services: 3:15PM follow up from the hospital  Contact information:  [address]  [facility] [place] [place] [address]  [phone]             Clyde Gale NP On 4/26/2019.    Specialty:  Internal Medicine  Why:  out patient services: 9:15 AM follow up from the hospital  Contact information:  [address] [place] [address]  [phone]                 Patient Instructions:      Notify your health care provider if you experience any of the following:  temperature >100.4     Notify your health care provider if you experience any of the following:  difficulty breathing or increased cough     Notify your health care provider if you experience any of the following:  increased confusion or weakness     Activity as tolerated       Significant Diagnostic Studies: Labs: BMP: No results for input(s): GLU, NA, K, CL, CO2, BUN, CREATININE, CALCIUM, MG in the last 48 hours., CMP No results for input(s): NA, K, CL, CO2, GLU, BUN, CREATININE, CALCIUM, PROT, ALBUMIN, BILITOT, ALKPHOS, AST, ALT, ANIONGAP, ESTGFRAFRICA, EGFRNONAA in the last 48 hours., CBC No results for input(s): WBC, HGB, HCT, PLT in the last 48 hours., INR No results found for: INR, PROTIME, Lipid Panel No results found for: CHOL, HDL, LDLCALC, TRIG, CHOLHDL and Troponin No results for input(s): TROPONINI in the last 168 hours.    Pending Diagnostic Studies:      Procedure Component Value Units Date/Time    EKG 12-lead [[id number]] Collected:  04/22/19 1249    Order Status:  Sent Lab Status:  In process Updated:  04/22/19 1330    Narrative:       Test Reason : R06.02,    Vent. Rate : 078 BPM     Atrial Rate : 078 BPM     P-R Int : 190 ms          QRS Dur : 166 ms      QT Int : 474 ms       P-R-T Axes : 070 093 007 degrees     QTc Int : 540 ms    Normal sinus rhythm  Rightward axis  Left bundle branch block  Abnormal ECG  When compared with ECG of 22-APR-2019 07:57,  Significant changes have occurred    Referred By: [email or web address]   SELF           Confirmed By:     EKG 12-lead [[id number]]     Order Status:  Sent Lab Status:  No result     EKG 12-lead [[id number]]     Order Status:  Sent Lab Status:  No result     Hemoglobin A1c [[id number]] Collected:  04/22/19 1306    Order Status:  Sent Lab Status:  In process Updated:  04/22/19 1307    Specimen:  Blood     Troponin I [[id number]] Collected:  04/22/19 1307    Order Status:  Sent Lab Status:  In process Updated:  04/22/19 1338    Specimen:  Blood          Medications:  Reconciled Home Medications:      Medication List      CHANGE how you take these medications    albuterol-ipratropium 2.5 mg-0.5 mg/3 mL nebulizer solution  Commonly known as:  DUO-NEB  Take 3 mLs by nebulization every 6 (six) hours while awake. Rescue  What changed:    · when to take this  · reasons to take this     predniSONE 20 MG tablet  Commonly known as:  DELTASONE  Take 1 tablet (20 mg total) by mouth once daily. for 5 days  What changed:  See the new instructions.        CONTINUE taking these medications    amiodarone 200 MG Tab  Commonly known as:  PACERONE  TAKE 1 TABLET BY MOUTH IN THE MORNING AND A HALF TABLET BY MOUTH IN THE EVENING     carvedilol 6.25 MG tablet  Commonly known as:  COREG  TK 1 T PO  BID     clopidogrel 75 mg tablet  Commonly known as:  PLAVIX  Take 75 mg by mouth once daily.     furosemide 40 MG tablet  Commonly known as:  LASIX  1 TABLET  BY MOUTH DAILY MAY TAKE TAB IN THE EVENING IF FOR SHORTNESS OF BREATH OR 5LB WEIGHT GAIN     levothyroxine 50 MCG tablet  Commonly known as:  SYNTHROID  Take 50 mcg by mouth once daily.     montelukast 10 mg tablet  Commonly known as:  SINGULAIR  Take 10 mg by mouth once daily.     potassium chloride SA 10 MEQ tablet  Commonly known as:  K-DUR,KLOR-CON  Take 10 mEq by mouth every other day.     pravastatin 10 MG tablet  Commonly known as:  PRAVACHOL  TK 1 T PO QHS        STOP taking these medications    albuterol 2.5 mg /3 mL (0.083 %) nebulizer solution  Commonly known as:  PROVENTIL     omeprazole 20 MG capsule  Commonly known as:  PRILOSEC     TRADJENTA 5 mg Tab tablet  Generic drug:  linagliptin            Indwelling Lines/Drains at time of discharge:   Lines/Drains/Airways          None          Time spent on the discharge of patient: 35 minutes  Patient was seen and examined on the date of discharge and determined to be suitable for discharge.         Harmony Hampton NP  Department of Hospital Medicine  [facility]

## 2019-04-22 NOTE — ED PROVIDER NOTES
Encounter Date: 4/22/2019       History     Chief Complaint   Patient presents with    Wheezing     pt c/o wheezing and cough for about a week. states that she has some tessalon perlles but not helping. also c/o some sinus congestion.      63-year-old female chief complaint wheezing, shortness of breath, productive cough for 1 week.  Patient states she is coughing up sick white white mucus.  Patient states she has sinus pain and sinus pressure that is worse when she bends over.  Patient states she has a history of asthma heart failure and seasonal allergies.  Patient states she used her albuterol home this morning felt like she was still wheezing so came to the ER to be checked out.  Patient states she has no fever no chest pain no nausea no vomiting no diarrhea    The history is provided by the patient and the spouse.     Review of patient's allergies indicates:   Allergen Reactions    Nut - unspecified Shortness Of Breath     Past Medical History:   Diagnosis Date    Asthma     Congestive heart failure     Diabetes mellitus     Type II    Hyperlipidemia     Pacemaker     Pedal edema      Past Surgical History:   Procedure Laterality Date    Cardiac bypass  1994    CARDIAC PACEMAKER PLACEMENT  2016     Family History   Problem Relation Age of Onset    Heart disease Father     Heart disease Mother     Cancer Mother         Bone and breast    Breast cancer Mother 50    Diabetes Brother     Diabetes Sister      Social History     Tobacco Use    Smoking status: Former Smoker    Smokeless tobacco: Never Used   Substance Use Topics    Alcohol use: No    Drug use: No     Review of Systems   Constitutional: Positive for chills. Negative for fever.   HENT: Positive for congestion, sinus pressure and sinus pain. Negative for sore throat.    Respiratory: Positive for cough and wheezing.    Cardiovascular: Negative for chest pain.   Gastrointestinal: Negative for constipation, nausea and vomiting.    Genitourinary: Negative for dysuria.   Musculoskeletal: Negative for back pain.   Skin: Negative for rash.   Neurological: Negative for weakness.   Hematological: Does not bruise/bleed easily.   All other systems reviewed and are negative.      Physical Exam     Initial Vitals [04/22/19 0656]   BP Pulse Resp Temp SpO2   128/69 78 18 97.6 °F (36.4 °C) 97 %      MAP       --         Physical Exam    Nursing note and vitals reviewed.  Constitutional: She appears well-developed and well-nourished.   HENT:   Head: Normocephalic and atraumatic.   Right Ear: Tympanic membrane and external ear normal.   Left Ear: Tympanic membrane and external ear normal.   Nose: Mucosal edema and rhinorrhea present. Right sinus exhibits maxillary sinus tenderness. Left sinus exhibits maxillary sinus tenderness.   Mouth/Throat: Uvula is midline. Posterior oropharyngeal erythema present. No oropharyngeal exudate or posterior oropharyngeal edema.   White postnasal drip   Eyes: Conjunctivae and EOM are normal. Pupils are equal, round, and reactive to light. Right eye exhibits no discharge. Left eye exhibits no discharge.   Neck: Normal range of motion. Neck supple. JVD present.   Cardiovascular: Normal rate, regular rhythm, normal heart sounds and intact distal pulses. Exam reveals no gallop and no friction rub.    No murmur heard.  Pulmonary/Chest: Tachypnea noted. No respiratory distress. She has decreased breath sounds. She has wheezes. She has no rhonchi. She has rales. She exhibits no tenderness.   Abdominal: Soft. Bowel sounds are normal. She exhibits no distension and no mass. There is no tenderness. There is no rebound and no guarding.   Musculoskeletal: Normal range of motion. She exhibits no edema or tenderness.   Neurological: She is alert and oriented to person, place, and time. She has normal strength and normal reflexes. No cranial nerve deficit or sensory deficit.   Skin: Skin is warm and dry. Capillary refill takes less than  2 seconds. No rash and no abscess noted. No pallor.   Psychiatric: She has a normal mood and affect.         ED Course   Critical Care  Date/Time: 4/22/2019 2:32 PM  Performed by: Urvashi Lauren DO  Authorized by: Pati Sher MD   Direct patient critical care time: 10 minutes  Additional history critical care time: 10 minutes  Ordering / reviewing critical care time: 10 minutes  Documentation critical care time: 10 minutes  Consulting other physicians critical care time: 6 minutes  Total critical care time (exclusive of procedural time) : 46 minutes  Critical care was necessary to treat or prevent imminent or life-threatening deterioration of the following conditions: respiratory failure and dehydration.  Critical care was time spent personally by me on the following activities: examination of patient, obtaining history from patient or surrogate, ordering and performing treatments and interventions, ordering and review of laboratory studies, ordering and review of radiographic studies, pulse oximetry, re-evaluation of patient's condition and review of old charts.        Labs Reviewed   POCT URINALYSIS W/O SCOPE - Abnormal; Notable for the following components:       Result Value    Glucose, UA Negative (*)     Bilirubin, UA Negative (*)     Ketones, UA Negative (*)     Blood, UA Negative (*)     Protein, UA Negative (*)     Nitrite, UA Negative (*)     Leukocytes, UA Negative (*)     All other components within normal limits   POCT GLUCOSE - Abnormal; Notable for the following components:    POCT Glucose 173 (*)     All other components within normal limits   ISTAT PROCEDURE - Abnormal; Notable for the following components:    POC PTWBT 14.6 (*)     All other components within normal limits   POCT B-TYPE NATRIURETIC PEPTIDE (BNP) - Abnormal; Notable for the following components:    POC B-Type Natriuretic Peptide 1790 (*)     All other components within normal limits   ISTAT PROCEDURE - Abnormal; Notable for  the following components:    POC PCO2 55.5 (*)     POC PO2 14 (*)     POC HCO3 35.9 (*)     POC SATURATED O2 18 (*)     POC TCO2 38 (*)     All other components within normal limits   TROPONIN ISTAT   POCT CBC   POCT URINALYSIS W/O SCOPE   POCT GLUCOSE, HAND-HELD DEVICE   POCT CMP   POCT PROTIME-INR   POCT TROPONIN   POCT B-TYPE NATRIURETIC PEPTIDE (BNP)   POCT CMP     EKG Readings: (Independently Interpreted)   Initial Reading: No STEMI. Previous EKG: Compared with most recent EKG Rhythm: Normal Sinus Rhythm. Heart Rate: Sixty-four. Axis: Normal. Other Impression: Abnormal EKG, QTC prolonged at 5:26 a.m., when compared to prior EKG done on January 8, 2019 rate has increased by 14 beats per minute today       Imaging Results          X-Ray Chest PA And Lateral (Final result)  Result time 04/22/19 07:49:55    Final result by Dariana Garcia MD (04/22/19 07:49:55)                 Impression:      Please see above.      Electronically signed by: Dariana Garcia MD  Date:    04/22/2019  Time:    07:49             Narrative:    EXAMINATION:  XR CHEST PA AND LATERAL    CLINICAL HISTORY:  cough;    TECHNIQUE:  PA and lateral views of the chest were performed.    COMPARISON:  03/13/2019    FINDINGS:  There is postoperative change of prior median sternotomy.  Stably positioned left-sided cardiac pacing device in place.  There is continued enlargement of the cardiomediastinal silhouette.  The visualized airway is unremarkable.  The lungs are symmetrically expanded with pulmonary vascular congestion and increased interstitial and parenchymal attenuation which can be seen with pulmonary edema/CHF.  No significant volume of pleural fluid or pneumothorax.  Visualized osseous structures are intact.                                                 Medical decision making   Chief complaint:  Wheezing, shortness of breath, Productive cough, sinus pain and sinus pressure  Differential diagnosis:  URI, viral illness, pneumonia, bronchitis,  acute sinusitis, asthma exacerbation, CHF exacerbation, and hyperglycemia.  Treatment in the ED Physical Exam, DuoNeb treatment given in the ER for wheezing and shortness of breath.  Patient reports feeling better after medication.    Discussed labs, and imaging results.    Patient remains short of breath at rest.  Patient given IV Lasix.  Patient and  are agreeable to transfer & admission at [facility].    I spoke with [facility] secondary 20 a at 8:55 a.m. and utilization Management Lina.  Requesting consultation with Internal Medicine for observation admit.   Discussed patient's presentation, past medical history, physical exam, and ED course.  Consultation with nurse practitioner Harmony Hampton for  DR Edson Castano for admission.  Also discussed vital signs and diagnosis of CHF exacerbation.  At this time patient will be transferred & admitted.      At time of transfer patient is awake alert oriented x4 speaking clearly in full sentences and moving all 4 extremities.       Clinical Impression:       ICD-10-CM ICD-9-CM   1. Acute congestive heart failure, unspecified heart failure type I50.9 428.0   2. Wheezing R06.2 786.07   3. Hypokalemia E87.6 276.8   4. SOB (shortness of breath) R06.02 786.05   5. Acute on chronic combined systolic and diastolic congestive heart failure I50.43 428.43     428.0                                Urvashi Lauren, DO  04/22/19 1431       Urvashi Lauren, DO  04/22/19 1432

## 2019-04-22 NOTE — SUBJECTIVE & OBJECTIVE
Past Medical History:   Diagnosis Date    Asthma     Congestive heart failure     Diabetes mellitus     Type II    Hyperlipidemia     Pacemaker     Pedal edema        Past Surgical History:   Procedure Laterality Date    Cardiac bypass  1994    CARDIAC PACEMAKER PLACEMENT  2016       Review of patient's allergies indicates:   Allergen Reactions    Nut - unspecified Shortness Of Breath       No current facility-administered medications on file prior to encounter.      Current Outpatient Medications on File Prior to Encounter   Medication Sig    albuterol (PROVENTIL) 2.5 mg /3 mL (0.083 %) nebulizer solution USE 1 VAIL IN NEBULIZER EVERY 4 TO 6 HOUR AS NEEDED SOV    albuterol-ipratropium (DUO-NEB) 2.5 mg-0.5 mg/3 mL nebulizer solution Take 3 mLs by nebulization every 6 (six) hours as needed for Wheezing. Rescue    amiodarone (PACERONE) 200 MG Tab TAKE 1 TABLET BY MOUTH IN THE MORNING AND A HALF TABLET BY MOUTH IN THE EVENING    carvedilol (COREG) 6.25 MG tablet TK 1 T PO  BID    clopidogrel (PLAVIX) 75 mg tablet Take 75 mg by mouth once daily.    furosemide (LASIX) 40 MG tablet 1 TABLET BY MOUTH DAILY MAY TAKE TAB IN THE EVENING IF FOR SHORTNESS OF BREATH OR 5LB WEIGHT GAIN    levothyroxine (SYNTHROID) 50 MCG tablet Take 50 mcg by mouth once daily.    potassium chloride SA (K-DUR,KLOR-CON) 10 MEQ tablet Take 10 mEq by mouth every other day.    pravastatin (PRAVACHOL) 10 MG tablet TK 1 T PO QHS    montelukast (SINGULAIR) 10 mg tablet Take 10 mg by mouth once daily.    [DISCONTINUED] omeprazole (PRILOSEC) 20 MG capsule TK ONE C PO  D    [DISCONTINUED] predniSONE (DELTASONE) 20 MG tablet TK 3 TS PO ONCE D FOR 4 DAYS    [DISCONTINUED] TRADJENTA 5 mg Tab tablet Take 5 mg by mouth once daily.     Family History     Problem Relation (Age of Onset)    Breast cancer Mother (50)    Cancer Mother    Diabetes Brother, Sister    Heart disease Father, Mother        Tobacco Use    Smoking status: Former Smoker     Smokeless tobacco: Never Used   Substance and Sexual Activity    Alcohol use: No    Drug use: No    Sexual activity: Never     Partners: Male     Review of Systems   Constitutional: Negative.  Negative for chills, diaphoresis, fatigue and fever.   HENT: Positive for postnasal drip and sore throat. Negative for congestion and rhinorrhea.    Eyes: Negative.    Respiratory: Positive for shortness of breath and wheezing. Negative for chest tightness.    Cardiovascular: Positive for leg swelling. Negative for chest pain.   Gastrointestinal: Negative.    Endocrine: Negative.    Genitourinary: Negative.    Musculoskeletal: Negative.    Neurological: Negative.    Psychiatric/Behavioral: Negative.      Objective:     Vital Signs (Most Recent):  Temp: 97.7 °F (36.5 °C) (04/22/19 1205)  Pulse: 81 (04/22/19 1205)  Resp: 19 (04/22/19 1205)  BP: 138/76 (04/22/19 1205)  SpO2: 95 % (04/22/19 1205) Vital Signs (24h Range):  Temp:  [97.5 °F (36.4 °C)-97.7 °F (36.5 °C)] 97.7 °F (36.5 °C)  Pulse:  [77-81] 81  Resp:  [16-20] 19  SpO2:  [95 %-100 %] 95 %  BP: (128-138)/(67-78) 138/76     Weight: 85.8 kg (189 lb 2.5 oz)  Body mass index is 34.6 kg/m².    Physical Exam   Constitutional: She is oriented to person, place, and time. She appears well-developed and well-nourished. No distress.   HENT:   Head: Atraumatic.   Eyes: EOM are normal.   Neck: Neck supple.   Cardiovascular: Normal rate and regular rhythm.   No murmur heard.  Pulmonary/Chest: Effort normal. Stridor: mild scattered wheezing. She has wheezes.   Abdominal: Soft. Bowel sounds are normal. She exhibits no distension.   Musculoskeletal: Normal range of motion. She exhibits edema ([id number]). She exhibits no deformity.   Neurological: She is alert and oriented to person, place, and time.   Skin: Skin is warm and dry.   Psychiatric: She has a normal mood and affect.         CRANIAL NERVES     CN III, IV, VI   Extraocular motions are normal.        Significant Labs: All  pertinent labs within the past 24 hours have been reviewed.    Significant Imaging: I have reviewed and interpreted all pertinent imaging results/findings within the past 24 hours.

## 2019-04-22 NOTE — ASSESSMENT & PLAN NOTE
Patient admitted for acute on chronic combined systolic diastolic heart failure and asthma exacerbation.   At [facility], BNP 1790 (1670 3 months ago). Troponin negative. CXR showed vascular congestion and increase interstitial parenchymal. Duoneb, IV steriod, IV lasix 80 mg given at [facility].  On arrival to observation, patient report breathing at baseline and wants to go home. BLE improved and breathing comfortably on RA. RA O2 saturation 98% ambulating. Patient stable for discharge home with close follow up Cardiology Dr. Pina. Instruct log daily weight and blood pressure. Will discharge home on prednisone and continue same home lasix 40 mg BID. See discharge home medication list below.

## 2019-04-23 LAB
ESTIMATED AVG GLUCOSE: 157 MG/DL (ref 68–131)
HBA1C MFR BLD HPLC: 7.1 % (ref 4–5.6)

## 2019-05-22 ENCOUNTER — HOSPITAL ENCOUNTER (EMERGENCY)
Facility: HOSPITAL | Age: 64
Discharge: HOME | End: 2019-05-22
Attending: EMERGENCY MEDICINE
Payer: MEDICAID

## 2019-05-22 VITALS
RESPIRATION RATE: 18 BRPM | HEART RATE: 71 BPM | SYSTOLIC BLOOD PRESSURE: 137 MMHG | WEIGHT: 197 LBS | BODY MASS INDEX: 36.25 KG/M2 | DIASTOLIC BLOOD PRESSURE: 80 MMHG | HEIGHT: 62 IN | OXYGEN SATURATION: 97 % | TEMPERATURE: 99 F

## 2019-05-22 DIAGNOSIS — R05.9 COUGH: ICD-10-CM

## 2019-05-22 DIAGNOSIS — J32.9 RHINOSINUSITIS: ICD-10-CM

## 2019-05-22 DIAGNOSIS — R06.02 SHORTNESS OF BREATH: Primary | ICD-10-CM

## 2019-05-22 DIAGNOSIS — R07.9 CHEST PAIN: ICD-10-CM

## 2019-05-22 LAB
ALBUMIN SERPL-MCNC: 3.4 G/DL (ref 3.3–5.5)
ALBUMIN SERPL-MCNC: 3.4 G/DL (ref 3.3–5.5)
ALLENS TEST: ABNORMAL
ALP SERPL-CCNC: 74 U/L (ref 42–141)
ALP SERPL-CCNC: 77 U/L (ref 42–141)
BILIRUB SERPL-MCNC: 0.5 MG/DL (ref 0.2–1.6)
BILIRUB SERPL-MCNC: 0.5 MG/DL (ref 0.2–1.6)
BUN SERPL-MCNC: 31 MG/DL (ref 7–22)
CALCIUM SERPL-MCNC: 9.2 MG/DL (ref 8–10.3)
CHLORIDE SERPL-SCNC: 103 MMOL/L (ref 98–108)
CREAT SERPL-MCNC: 2.6 MG/DL (ref 0.6–1.2)
GLUCOSE SERPL-MCNC: 101 MG/DL (ref 73–118)
HCO3 UR-SCNC: 30.3 MMOL/L (ref 24–28)
LDH SERPL L TO P-CCNC: 0.9 MMOL/L (ref 0.5–2.2)
PCO2 BLDA: 51.6 MMHG (ref 35–45)
PH SMN: 7.38 [PH] (ref 7.35–7.45)
PO2 BLDA: 179 MMHG (ref 40–60)
POC ALT (SGPT): 12 U/L (ref 10–47)
POC ALT (SGPT): 19 U/L (ref 10–47)
POC AMYLASE: 55 U/L (ref 14–97)
POC AST (SGOT): 25 U/L (ref 11–38)
POC AST (SGOT): 26 U/L (ref 11–38)
POC B-TYPE NATRIURETIC PEPTIDE: 1400 PG/ML (ref 0–100)
POC BE: 4 MMOL/L
POC CARDIAC TROPONIN I: 0.02 NG/ML
POC GGT: 73 U/L (ref 5–65)
POC SATURATED O2: 100 % (ref 95–100)
POC TCO2: 30 MMOL/L (ref 18–33)
POC TCO2: 32 MMOL/L (ref 24–29)
POTASSIUM BLD-SCNC: 3.7 MMOL/L (ref 3.6–5.1)
PROTEIN, POC: 7.1 G/DL (ref 6.4–8.1)
PROTEIN, POC: 7.1 G/DL (ref 6.4–8.1)
SAMPLE: ABNORMAL
SAMPLE: NORMAL
SITE: ABNORMAL
SODIUM BLD-SCNC: 141 MMOL/L (ref 128–145)

## 2019-05-22 PROCEDURE — 93005 ELECTROCARDIOGRAM TRACING: CPT | Mod: ER

## 2019-05-22 PROCEDURE — 94640 AIRWAY INHALATION TREATMENT: CPT | Mod: ER

## 2019-05-22 PROCEDURE — 93010 EKG 12-LEAD: ICD-10-PCS | Mod: ,,, | Performed by: INTERNAL MEDICINE

## 2019-05-22 PROCEDURE — 25000242 PHARM REV CODE 250 ALT 637 W/ HCPCS: Mod: ER | Performed by: EMERGENCY MEDICINE

## 2019-05-22 PROCEDURE — 63600175 PHARM REV CODE 636 W HCPCS: Mod: ER | Performed by: EMERGENCY MEDICINE

## 2019-05-22 PROCEDURE — 94760 N-INVAS EAR/PLS OXIMETRY 1: CPT | Mod: ER

## 2019-05-22 PROCEDURE — 96374 THER/PROPH/DIAG INJ IV PUSH: CPT | Mod: ER

## 2019-05-22 PROCEDURE — 99284 EMERGENCY DEPT VISIT MOD MDM: CPT | Mod: 25,ER

## 2019-05-22 PROCEDURE — 93010 ELECTROCARDIOGRAM REPORT: CPT | Mod: ,,, | Performed by: INTERNAL MEDICINE

## 2019-05-22 RX ORDER — MONTELUKAST SODIUM 10 MG/1
10 TABLET ORAL NIGHTLY
Qty: 30 TABLET | Refills: 0 | Status: SHIPPED | OUTPATIENT
Start: 2019-05-22 | End: 2019-06-21

## 2019-05-22 RX ORDER — PREDNISONE 20 MG/1
40 TABLET ORAL DAILY
Qty: 10 TABLET | Refills: 0 | Status: SHIPPED | OUTPATIENT
Start: 2019-05-22 | End: 2019-05-27

## 2019-05-22 RX ORDER — IPRATROPIUM BROMIDE AND ALBUTEROL SULFATE 2.5; .5 MG/3ML; MG/3ML
3 SOLUTION RESPIRATORY (INHALATION)
Status: COMPLETED | OUTPATIENT
Start: 2019-05-22 | End: 2019-05-22

## 2019-05-22 RX ORDER — PROMETHAZINE HYDROCHLORIDE AND DEXTROMETHORPHAN HYDROBROMIDE 6.25; 15 MG/5ML; MG/5ML
5 SYRUP ORAL NIGHTLY PRN
Qty: 118 ML | Refills: 0 | Status: SHIPPED | OUTPATIENT
Start: 2019-05-22 | End: 2019-06-01

## 2019-05-22 RX ORDER — FUROSEMIDE 10 MG/ML
40 INJECTION INTRAMUSCULAR; INTRAVENOUS
Status: COMPLETED | OUTPATIENT
Start: 2019-05-22 | End: 2019-05-22

## 2019-05-22 RX ORDER — BENZONATATE 100 MG/1
100 CAPSULE ORAL 3 TIMES DAILY PRN
Qty: 20 CAPSULE | Refills: 0 | Status: SHIPPED | OUTPATIENT
Start: 2019-05-22 | End: 2019-06-01

## 2019-05-22 RX ORDER — FLUTICASONE PROPIONATE 50 MCG
2 SPRAY, SUSPENSION (ML) NASAL DAILY
Qty: 16 G | Refills: 0 | Status: SHIPPED | OUTPATIENT
Start: 2019-05-22

## 2019-05-22 RX ORDER — LORATADINE 10 MG/1
10 TABLET ORAL DAILY
Qty: 60 TABLET | Refills: 0 | COMMUNITY
Start: 2019-05-22 | End: 2020-05-21

## 2019-05-22 RX ADMIN — IPRATROPIUM BROMIDE AND ALBUTEROL SULFATE 3 ML: .5; 3 SOLUTION RESPIRATORY (INHALATION) at 03:05

## 2019-05-22 RX ADMIN — FUROSEMIDE 40 MG: 10 INJECTION, SOLUTION INTRAMUSCULAR; INTRAVENOUS at 04:05

## 2019-05-22 NOTE — ED PROVIDER NOTES
Encounter Date: 5/22/2019       History     Chief Complaint   Patient presents with    Shortness of Breath     PT C/O COUGH, SOB WHEN LYING DOWN AND CHEST TIGHTNESS SINCE MONDAY     63 y.o. Female with diabetes, heart failure, asthma, hyperlipidemia, pacemaker and others presents to the emergency department complaining of acute shortness of breath that began on Monday.  She states symptoms are provoked by coughing spells and lying down.  She reports associated productive cough with white sputum, nasal congestion, postnasal drip and chest tightness.  She reports taking Mucinex and home nebulizer treatments without improvement.  She denies tobacco use stating she quit 3 years ago but is exposed to secondhand smoke.  .4 pillow orthopnea is chronic and unchanged. Denies PABLO.          Review of patient's allergies indicates:   Allergen Reactions    Nut - unspecified Shortness Of Breath     Past Medical History:   Diagnosis Date    Asthma     Congestive heart failure     Diabetes mellitus     Type II    Hyperlipidemia     Pacemaker     Pedal edema      Past Surgical History:   Procedure Laterality Date    Cardiac bypass  1994    CARDIAC PACEMAKER PLACEMENT  2016     Family History   Problem Relation Age of Onset    Heart disease Father     Heart disease Mother     Cancer Mother         Bone and breast    Breast cancer Mother 50    Diabetes Brother     Diabetes Sister      Social History     Tobacco Use    Smoking status: Former Smoker    Smokeless tobacco: Never Used   Substance Use Topics    Alcohol use: No    Drug use: No     Review of Systems   Constitutional: Negative for fever.   HENT: Positive for congestion and postnasal drip. Negative for rhinorrhea.    Respiratory: Positive for cough, chest tightness and shortness of breath.    Cardiovascular: Positive for leg swelling (Chronic, intermittent, controlled with fluid pills.).   Gastrointestinal: Negative for nausea and vomiting.   Neurological:  Negative for weakness.   All other systems reviewed and are negative.      Physical Exam     Initial Vitals [05/22/19 0242]   BP Pulse Resp Temp SpO2   122/72 70 (!) 26 98.7 °F (37.1 °C) 97 %      MAP       --         Physical Exam    Nursing note and vitals reviewed.  Constitutional: She appears well-developed and well-nourished. She is not diaphoretic. No distress.   HENT:   Head: Normocephalic and atraumatic.   Mouth/Throat: Oropharynx is clear and moist.   Eyes: Conjunctivae are normal. Pupils are equal, round, and reactive to light.   Neck: Normal range of motion and phonation normal. Neck supple. No stridor present.   Cardiovascular: Normal rate and intact distal pulses.   Pulmonary/Chest: Effort normal. No accessory muscle usage. No tachypnea. No respiratory distress. She has no decreased breath sounds. She has no wheezes. She has rhonchi (clears with coughing). She has no rales.   Abdominal: She exhibits no distension. There is no tenderness.   Musculoskeletal: Normal range of motion. She exhibits no tenderness.   Neurological: She is alert and oriented to person, place, and time. She has normal strength. Gait normal. GCS eye subscore is 4. GCS verbal subscore is 5. GCS motor subscore is 6.   Skin: Skin is warm. Capillary refill takes less than 2 seconds.   Psychiatric: She has a normal mood and affect.         ED Course   Procedures  Labs Reviewed   POCT CMP - Abnormal; Notable for the following components:       Result Value    POC BUN 31 (*)     POC Creatinine 2.6 (*)     All other components within normal limits   POCT LIVER PANEL - Abnormal; Notable for the following components:    POC GGT 73 (*)     All other components within normal limits   ISTAT PROCEDURE - Abnormal; Notable for the following components:    POC PCO2 51.6 (*)     POC PO2 179 (*)     POC HCO3 30.3 (*)     POC TCO2 32 (*)     All other components within normal limits   POCT B-TYPE NATRIURETIC PEPTIDE (BNP) - Abnormal; Notable for the  following components:    POC B-Type Natriuretic Peptide 1400 (*)     All other components within normal limits   TROPONIN ISTAT   POCT CBC   POCT CMP   POCT B-TYPE NATRIURETIC PEPTIDE (BNP)   POCT TROPONIN     EKG Readings: (Independently Interpreted)   Initial Reading: No STEMI. Previous EKG Date: 4/22/19. Rhythm: Normal Sinus Rhythm. Heart Rate: 72. Ectopy: PVCs. Conduction: LBBB. T Waves Flipped: I and AVL. Axis: Left Axis Deviation. Q Waves: III. Other Impression: LBBB also on previous EKG     ECG Results          EKG 12-lead (Final result)  Result time 05/22/19 22:48:00    Final result by Interface, Lab In [facility] (05/22/19 22:48:00)                 Narrative:    Test Reason : R07.9,    Vent. Rate : 072 BPM     Atrial Rate : 072 BPM     P-R Int : 194 ms          QRS Dur : 170 ms      QT Int : 492 ms       P-R-T Axes : 061 -27 110 degrees     QTc Int : 538 ms    Sinus rhythm with occasional Premature ventricular complexes  Possible Left atrial enlargement  Left bundle branch block  Abnormal ECG    Confirmed by Bill Cast MD ([id number]) on 5/22/2019 10:47:52 PM    Referred By: [email or web address]   SELF           Confirmed By:Bill Cast MD                            Imaging Results          X-Ray Chest PA And Lateral (Final result)  Result time 05/22/19 03:22:07    Final result by Chavez Kulkarni MD (05/22/19 03:22:07)                 Impression:      No acute cardiopulmonary process.    Status post CABG.  Stable cardiomegaly with AICD.      Electronically signed by: Chavez Kulkarni MD  Date:    05/22/2019  Time:    03:22             Narrative:    EXAMINATION:  XR CHEST PA AND LATERAL    CLINICAL HISTORY:  Cough    TECHNIQUE:  PA and lateral views of the chest were performed.    COMPARISON:  April 22, 2019.    FINDINGS:  Postop CABG changes and AICD appear unchanged.    There is no consolidation, effusion, or pneumothorax.    Cardiomediastinal silhouette is enlarged, stable.    Regional osseous structures are  unchanged.                                 Medical Decision Making:   Clinical Tests:   Lab Tests: Ordered and Reviewed       <> Summary of Lab: White count 5.3 H&H 12.6 and 30.5 platelets 188 MCV 68.3 RDW 15.4  Radiological Study: Ordered and Reviewed  Medical Tests: Reviewed and Ordered  ED Management:  Patient states she does not feel as though her shortness of breath while lying supine is related to heart failure.  She states this due to the nasal congestion and postnasal drip.  No respiratory distress or hypoxia.    Results for GIORGIO ALVAREZ (MRN [id number]) as of 5/22/2019 04:06   Ref. Range 4/22/2019 07:56   POC Creatinine Latest Units: mg/dL 2.6     Results for GIORGIO ALVAREZ (MRN [id number]) as of 5/22/2019 04:06   Ref. Range 4/9/2017 07:28   POC Creatinine Latest Ref Range: 0.6 - 1.2 mg/dL 1.8 (H)        Results for GIORGIO ALVAREZ (MRN [id number]) as of 5/22/2019 03:37   Ref. Range 4/9/2017 07:57 1/8/2019 01:00 4/22/2019 08:13   POC B-Type Natriuretic Peptide Latest Units: pg/mL 946 (H) 1670 (H) 1790 (HH)     Labs Reviewed  Admission on 05/22/2019, Discharged on 05/22/2019   Component Date Value Ref Range Status    Albumin, POC 05/22/2019 3.4  3.3 - 5.5 g/dL Final    Alkaline Phosphatase, POC 05/22/2019 74  42 - 141 U/L Final    ALT (SGPT), POC 05/22/2019 19  10 - 47 U/L Final    AST (SGOT), POC 05/22/2019 25  11 - 38 U/L Final    POC BUN 05/22/2019 31* 7 - 22 mg/dL Final    Calcium, POC 05/22/2019 9.2  8 - 10.3 mg/dL Final    POC Chloride 05/22/2019 103  98 - 108 mmol/L Final    POC Creatinine 05/22/2019 2.6* 0.6 - 1.2 mg/dL Final    POC Glucose 05/22/2019 101  73 - 118 mg/dL Final    POC Potassium 05/22/2019 3.7  3.6 - 5.1 mmol/L Final    POC Sodium 05/22/2019 141  128 - 145 mmol/L Final    Bilirubin 05/22/2019 0.5  0.2 - 1.6 mg/dL Final    POC TCO2 05/22/2019 30  18 - 33 mmol/L Final    Protein 05/22/2019 7.1  6.4 - 8.1 g/dL Final    Albumin, POC 05/22/2019 3.4  3.3 - 5.5 g/dL Final    Alkaline  Phosphatase, POC 05/22/2019 77  42 - 141 U/L Final    ALT (SGPT), POC 05/22/2019 12  10 - 47 U/L Final    Amylase, POC 05/22/2019 55  14 - 97 U/L Final    AST (SGOT), POC 05/22/2019 26  11 - 38 U/L Final    POC GGT 05/22/2019 73* 5 - 65 U/L Final    Bilirubin 05/22/2019 0.5  0.2 - 1.6 mg/dL Final    Protein 05/22/2019 7.1  6.4 - 8.1 g/dL Final    POC PH 05/22/2019 7.377  7.35 - 7.45 Final    POC PCO2 05/22/2019 51.6* 35 - 45 mmHg Final    POC PO2 05/22/2019 179* 40 - 60 mmHg Final    POC HCO3 05/22/2019 30.3* 24 - 28 mmol/L Final    POC BE 05/22/2019 4  -2 to 2 mmol/L Final    POC SATURATED O2 05/22/2019 100  95 - 100 % Final    POC Lactate 05/22/2019 0.90  0.5 - 2.2 mmol/L Final    POC TCO2 05/22/2019 32* 24 - 29 mmol/L Final    Sample 05/22/2019 VENOUS   Final    Site 05/22/2019 Other   Final    Allens Test 05/22/2019 N/A   Final    POC Cardiac Troponin I 05/22/2019 0.02  <0.09 ng/mL Final    Sample 05/22/2019 UNK   Final    POC B-Type Natriuretic Peptide 05/22/2019 1400* 0 - 100 pg/mL Final        Imaging Reviewed    Imaging Results          X-Ray Chest PA And Lateral (Final result)  Result time 05/22/19 03:22:07    Final result by Chavez Kulkarni MD (05/22/19 03:22:07)                 Impression:      No acute cardiopulmonary process.    Status post CABG.  Stable cardiomegaly with AICD.      Electronically signed by: Chavez Kulkarni MD  Date:    05/22/2019  Time:    03:22             Narrative:    EXAMINATION:  XR CHEST PA AND LATERAL    CLINICAL HISTORY:  Cough    TECHNIQUE:  PA and lateral views of the chest were performed.    COMPARISON:  April 22, 2019.    FINDINGS:  Postop CABG changes and AICD appear unchanged.    There is no consolidation, effusion, or pneumothorax.    Cardiomediastinal silhouette is enlarged, stable.    Regional osseous structures are unchanged.                                Medications given in ED    Medications   albuterol-ipratropium 2.5 mg-0.5 mg/3 mL  nebulizer solution 3 mL (3 mLs Nebulization Given 5/22/19 0300)   furosemide injection 40 mg (40 mg Intravenous Given 5/22/19 8146)       Note was created using voice recognition software. Note may have occasional typographical errors that may not have been identified and edited despite good dalton initial review prior to signing.                      Clinical Impression:       ICD-10-CM ICD-9-CM   1. Shortness of breath R06.02 786.05   2. Chest pain R07.9 786.50   3. Cough R05 786.2   4. Rhinosinusitis J32.9 473.9         Disposition:   Disposition: Discharged  Condition: Stable                        Ilia Lujan MD  05/31/19 [id number]

## 2019-05-24 ENCOUNTER — HOSPITAL ENCOUNTER (EMERGENCY)
Facility: HOSPITAL | Age: 64
Discharge: HOME | End: 2019-05-24
Attending: EMERGENCY MEDICINE
Payer: MEDICAID

## 2019-05-24 VITALS
HEIGHT: 62 IN | TEMPERATURE: 98 F | HEART RATE: 80 BPM | RESPIRATION RATE: 20 BRPM | DIASTOLIC BLOOD PRESSURE: 72 MMHG | OXYGEN SATURATION: 97 % | BODY MASS INDEX: 36.25 KG/M2 | WEIGHT: 197 LBS | SYSTOLIC BLOOD PRESSURE: 120 MMHG

## 2019-05-24 DIAGNOSIS — N18.9 CHRONIC KIDNEY DISEASE, UNSPECIFIED CKD STAGE: Primary | ICD-10-CM

## 2019-05-24 DIAGNOSIS — R10.10 UPPER ABDOMINAL PAIN: ICD-10-CM

## 2019-05-24 DIAGNOSIS — R14.0 ABDOMINAL DISTENSION: ICD-10-CM

## 2019-05-24 LAB
ALBUMIN SERPL-MCNC: 3.4 G/DL (ref 3.3–5.5)
ALBUMIN SERPL-MCNC: 3.5 G/DL (ref 3.3–5.5)
ALP SERPL-CCNC: 78 U/L (ref 42–141)
ALP SERPL-CCNC: 79 U/L (ref 42–141)
BILIRUB SERPL-MCNC: 0.4 MG/DL (ref 0.2–1.6)
BILIRUB SERPL-MCNC: 0.4 MG/DL (ref 0.2–1.6)
BILIRUBIN, POC UA: NEGATIVE
BLOOD, POC UA: ABNORMAL
BUN SERPL-MCNC: 39 MG/DL (ref 7–22)
CALCIUM SERPL-MCNC: 10.2 MG/DL (ref 8–10.3)
CHLORIDE SERPL-SCNC: 100 MMOL/L (ref 98–108)
CLARITY, POC UA: CLEAR
COLOR, POC UA: YELLOW
CREAT SERPL-MCNC: 2.6 MG/DL (ref 0.6–1.2)
GLUCOSE SERPL-MCNC: 142 MG/DL (ref 73–118)
GLUCOSE, POC UA: NEGATIVE
KETONES, POC UA: NEGATIVE
LEUKOCYTE EST, POC UA: ABNORMAL
NITRITE, POC UA: NEGATIVE
PH UR STRIP: 6 [PH]
POC ALT (SGPT): 17 U/L (ref 10–47)
POC ALT (SGPT): 23 U/L (ref 10–47)
POC AMYLASE: 33 U/L (ref 14–97)
POC AST (SGOT): 37 U/L (ref 11–38)
POC AST (SGOT): 40 U/L (ref 11–38)
POC CARDIAC TROPONIN I: 0.02 NG/ML
POC GGT: 99 U/L (ref 5–65)
POC TCO2: 26 MMOL/L (ref 18–33)
POCT GLUCOSE: 145 MG/DL (ref 70–110)
POCT GLUCOSE: 145 MG/DL (ref 70–110)
POTASSIUM BLD-SCNC: 4.6 MMOL/L (ref 3.6–5.1)
PROTEIN, POC UA: NEGATIVE
PROTEIN, POC: 7.1 G/DL (ref 6.4–8.1)
PROTEIN, POC: 7.2 G/DL (ref 6.4–8.1)
SAMPLE: NORMAL
SODIUM BLD-SCNC: 139 MMOL/L (ref 128–145)
SPECIFIC GRAVITY, POC UA: 1.01
UROBILINOGEN, POC UA: 0.2 E.U./DL

## 2019-05-24 PROCEDURE — 82150 ASSAY OF AMYLASE: CPT | Mod: ER

## 2019-05-24 PROCEDURE — 85025 COMPLETE CBC W/AUTO DIFF WBC: CPT | Mod: ER

## 2019-05-24 PROCEDURE — 80053 COMPREHEN METABOLIC PANEL: CPT | Mod: ER

## 2019-05-24 PROCEDURE — 99284 EMERGENCY DEPT VISIT MOD MDM: CPT | Mod: 25,ER

## 2019-05-24 PROCEDURE — 93010 EKG 12-LEAD: ICD-10-PCS | Mod: ,,, | Performed by: INTERNAL MEDICINE

## 2019-05-24 PROCEDURE — 81003 URINALYSIS AUTO W/O SCOPE: CPT | Mod: ER

## 2019-05-24 PROCEDURE — 93005 ELECTROCARDIOGRAM TRACING: CPT | Mod: ER

## 2019-05-24 PROCEDURE — 63600175 PHARM REV CODE 636 W HCPCS: Mod: ER | Performed by: EMERGENCY MEDICINE

## 2019-05-24 PROCEDURE — 84484 ASSAY OF TROPONIN QUANT: CPT | Mod: ER

## 2019-05-24 PROCEDURE — 93010 ELECTROCARDIOGRAM REPORT: CPT | Mod: ,,, | Performed by: INTERNAL MEDICINE

## 2019-05-24 PROCEDURE — 96372 THER/PROPH/DIAG INJ SC/IM: CPT | Mod: ER

## 2019-05-24 RX ORDER — DICYCLOMINE HYDROCHLORIDE 10 MG/ML
20 INJECTION INTRAMUSCULAR
Status: COMPLETED | OUTPATIENT
Start: 2019-05-24 | End: 2019-05-24

## 2019-05-24 RX ORDER — DICYCLOMINE HYDROCHLORIDE 20 MG/1
20 TABLET ORAL 2 TIMES DAILY
Qty: 30 TABLET | Refills: 0 | Status: ON HOLD | OUTPATIENT
Start: 2019-05-24 | End: 2019-06-09

## 2019-05-24 RX ADMIN — DICYCLOMINE HYDROCHLORIDE 20 MG: 20 INJECTION, SOLUTION INTRAMUSCULAR at 07:05

## 2019-05-24 NOTE — ED PROVIDER NOTES
Encounter Date: 5/24/2019       History     Chief Complaint   Patient presents with    Abdominal Pain     Patient stated for about 2 days she has been having abdominal pain and back pain with abdominal bloating   Denies nausea, vomiting or diarrhea   Last bowel movement was monday after taking a laxative  Denies urinary symptoms     Chief complaint:  Abdominal pain  63-year-old complains of pain to the right side of her abdomen associated with swelling to the abdomen.  Patient's symptoms began yesterday and have been constant.  Patient's symptoms worsen with movement.  She took a laxative a few days ago secondary to constipation with improvement.  She denies nausea, vomiting, diarrhea or difficulty urinating.  Patient has not taken anything for the pain.  Patient was seen here 2 days ago for congestive heart failure.  She said that the abdominal pain is making her short of breath which is ongoing.  Patient rates her pain as 10/10.  No abdominal surgeries.    The history is provided by the patient and medical records.     Review of patient's allergies indicates:   Allergen Reactions    Nut - unspecified Shortness Of Breath     Past Medical History:   Diagnosis Date    Asthma     Congestive heart failure     Diabetes mellitus     Type II    Hyperlipidemia     Pacemaker     Pedal edema      Past Surgical History:   Procedure Laterality Date    Cardiac bypass  1994    CARDIAC PACEMAKER PLACEMENT  2016     Family History   Problem Relation Age of Onset    Heart disease Father     Heart disease Mother     Cancer Mother         Bone and breast    Breast cancer Mother 50    Diabetes Brother     Diabetes Sister      Social History     Tobacco Use    Smoking status: Former Smoker    Smokeless tobacco: Never Used   Substance Use Topics    Alcohol use: No    Drug use: No     Review of Systems   Constitutional: Negative for fever.   HENT: Negative for sore throat.    Respiratory: Positive for shortness of  breath.    Cardiovascular: Negative for chest pain.   Gastrointestinal: Positive for abdominal pain and constipation. Negative for nausea.   Genitourinary: Negative for dysuria.   Musculoskeletal: Negative for back pain.   Skin: Negative for rash.   Neurological: Negative for weakness.   Hematological: Does not bruise/bleed easily.       Physical Exam     Initial Vitals [05/24/19 0719]   BP Pulse Resp Temp SpO2   (!) 108/55 80 20 98.1 °F (36.7 °C) (!) 94 %      MAP       --         Physical Exam    Nursing note and vitals reviewed.  Constitutional: She appears well-developed and well-nourished. She appears distressed.   HENT:   Head: Normocephalic and atraumatic.   Eyes: Conjunctivae and EOM are normal. Pupils are equal, round, and reactive to light.   Neck: Normal range of motion. Neck supple.   Cardiovascular: Normal rate and regular rhythm.   Pulmonary/Chest: She has wheezes (Slight, anterior).   Abdominal: Soft. Bowel sounds are normal. There is tenderness in the right upper quadrant and epigastric area. There is no rebound and no guarding.       Musculoskeletal: Normal range of motion.   Neurological: She is alert and oriented to person, place, and time. She has normal strength.   Skin: Skin is warm and dry.   Psychiatric: She has a normal mood and affect.         ED Course   Procedures  Labs Reviewed   POCT GLUCOSE, HAND-HELD DEVICE     EKG Readings: (Independently Interpreted)   Initial Reading: No STEMI. Previous EKG: Compared with most recent EKG Previous EKG Date: May 2019. Conduction: LBBB.   Normal sinus rhythm, heart rate 75, occasional PVCs, left axis deviation, left then a branch block       Imaging Results    None          Medical Decision Making:   Initial Assessment:   63-year-old who complains of abdominal pain.  Patient has mild tenderness to upper abdomen with normal bowel sounds  ED Management:  CT of the abdomen and pelvis without contrast will be done secondary to renal insufficiency.   Patient's labs were significant for continued renal insufficiency that has not worsened.  CT showed trace ascites but no bowel obstruction or appendicitis.  Patient will be discharged on [place].  She was advised that she must follow up with her doctor.  She was given strict return precautions.  She has a normal white blood cell count.                      Clinical Impression:       ICD-10-CM ICD-9-CM   1. Chronic kidney disease, unspecified CKD stage N18.9 585.9   2. Abdominal distension R14.0 787.3   3. Upper abdominal pain R10.10 789.09                                Jeanna Martinez MD  05/24/19 1017       Jeanna Martinez MD  05/24/19 1340

## 2019-06-08 ENCOUNTER — HOSPITAL ENCOUNTER (OUTPATIENT)
Facility: HOSPITAL | Age: 64
Discharge: HOME | End: 2019-06-09
Attending: INTERNAL MEDICINE | Admitting: HOSPITALIST
Payer: MEDICAID

## 2019-06-08 DIAGNOSIS — J44.1 CHRONIC OBSTRUCTIVE PULMONARY DISEASE WITH ACUTE EXACERBATION: ICD-10-CM

## 2019-06-08 DIAGNOSIS — I50.43 ACUTE ON CHRONIC COMBINED SYSTOLIC AND DIASTOLIC CONGESTIVE HEART FAILURE: ICD-10-CM

## 2019-06-08 DIAGNOSIS — I25.5 ISCHEMIC CARDIOMYOPATHY: Primary | ICD-10-CM

## 2019-06-08 DIAGNOSIS — I50.9 HF (HEART FAILURE): ICD-10-CM

## 2019-06-08 DIAGNOSIS — J44.0 BRONCHITIS, CHRONIC OBSTRUCTIVE W ACUTE BRONCHITIS: ICD-10-CM

## 2019-06-08 DIAGNOSIS — F17.210 CONTINUOUS DEPENDENCE ON CIGARETTE SMOKING: ICD-10-CM

## 2019-06-08 DIAGNOSIS — E66.9 OBESITY (BMI 30.0-34.9): ICD-10-CM

## 2019-06-08 DIAGNOSIS — I50.9 CHF (CONGESTIVE HEART FAILURE): ICD-10-CM

## 2019-06-08 DIAGNOSIS — N18.4 CKD (CHRONIC KIDNEY DISEASE), STAGE IV: ICD-10-CM

## 2019-06-08 DIAGNOSIS — I82.4Z2: ICD-10-CM

## 2019-06-08 DIAGNOSIS — I50.30 (HFPEF) HEART FAILURE WITH PRESERVED EJECTION FRACTION: ICD-10-CM

## 2019-06-08 DIAGNOSIS — M79.89 LEG SWELLING: ICD-10-CM

## 2019-06-08 DIAGNOSIS — Z95.810 AICD (AUTOMATIC CARDIOVERTER/DEFIBRILLATOR) PRESENT: ICD-10-CM

## 2019-06-08 DIAGNOSIS — J20.9 BRONCHITIS, CHRONIC OBSTRUCTIVE W ACUTE BRONCHITIS: ICD-10-CM

## 2019-06-08 DIAGNOSIS — I44.7 LBBB (LEFT BUNDLE BRANCH BLOCK): ICD-10-CM

## 2019-06-08 PROBLEM — E78.00 HYPERCHOLESTEREMIA: Status: ACTIVE | Noted: 2019-06-08

## 2019-06-08 PROBLEM — Z72.0 TOBACCO ABUSE: Status: ACTIVE | Noted: 2019-06-08

## 2019-06-08 PROBLEM — I25.10 CORONARY ARTERY DISEASE: Status: ACTIVE | Noted: 2019-06-08

## 2019-06-08 PROBLEM — I82.412 DEEP VEIN THROMBOSIS (DVT) OF FEMORAL VEIN OF LEFT LOWER EXTREMITY: Status: ACTIVE | Noted: 2019-06-08

## 2019-06-08 LAB
ALBUMIN SERPL-MCNC: 3.4 G/DL
ALP SERPL-CCNC: 110 U/L (ref 42–141)
BILIRUB SERPL-MCNC: 0.6 MG/DL (ref 0.2–1.6)
BUN SERPL-MCNC: 26 MG/DL (ref 7–22)
CALCIUM SERPL-MCNC: 9.7 MG/DL (ref 8–10.3)
CHLORIDE SERPL-SCNC: 96 MMOL/L (ref 98–108)
CREAT SERPL-MCNC: 2.7 MG/DL (ref 0.6–1.2)
GLUCOSE SERPL-MCNC: 99 MG/DL (ref 73–118)
POC ALT (SGPT): 37 U/L (ref 10–47)
POC AST (SGOT): 70 U/L (ref 11–38)
POC B-TYPE NATRIURETIC PEPTIDE: 1950 PG/ML (ref 0–100)
POC CARDIAC TROPONIN I: 0 NG/ML
POC D-DI: 469 NG/ML (ref 0–450)
POC PTINR: 1.3 (ref 0.9–1.2)
POC PTWBT: 15 SEC (ref 9.7–14.3)
POC TCO2: 32 MMOL/L (ref 18–33)
POCT GLUCOSE: 124 MG/DL (ref 70–110)
POCT GLUCOSE: 324 MG/DL (ref 70–110)
POCT GLUCOSE: 400 MG/DL (ref 70–110)
POTASSIUM BLD-SCNC: 3.9 MMOL/L (ref 3.6–5.1)
PROTEIN, POC: 7.3 G/DL (ref 6.4–8.1)
SAMPLE: ABNORMAL
SAMPLE: NORMAL
SODIUM BLD-SCNC: 143 MMOL/L (ref 128–145)
TROPONIN I SERPL DL<=0.01 NG/ML-MCNC: 0.01 NG/ML (ref 0–0.03)
TROPONIN I SERPL DL<=0.01 NG/ML-MCNC: 0.02 NG/ML (ref 0–0.03)

## 2019-06-08 PROCEDURE — 25000242 PHARM REV CODE 250 ALT 637 W/ HCPCS: Mod: ER | Performed by: INTERNAL MEDICINE

## 2019-06-08 PROCEDURE — 63600175 PHARM REV CODE 636 W HCPCS: Mod: ER | Performed by: EMERGENCY MEDICINE

## 2019-06-08 PROCEDURE — 21400001 HC TELEMETRY ROOM

## 2019-06-08 PROCEDURE — 94640 AIRWAY INHALATION TREATMENT: CPT

## 2019-06-08 PROCEDURE — 94761 N-INVAS EAR/PLS OXIMETRY MLT: CPT

## 2019-06-08 PROCEDURE — 83880 ASSAY OF NATRIURETIC PEPTIDE: CPT | Mod: ER

## 2019-06-08 PROCEDURE — 83036 HEMOGLOBIN GLYCOSYLATED A1C: CPT

## 2019-06-08 PROCEDURE — 63600175 PHARM REV CODE 636 W HCPCS: Performed by: HOSPITALIST

## 2019-06-08 PROCEDURE — 85379 FIBRIN DEGRADATION QUANT: CPT | Mod: ER

## 2019-06-08 PROCEDURE — 85610 PROTHROMBIN TIME: CPT | Mod: ER

## 2019-06-08 PROCEDURE — 93005 ELECTROCARDIOGRAM TRACING: CPT | Mod: ER

## 2019-06-08 PROCEDURE — 80053 COMPREHEN METABOLIC PANEL: CPT | Mod: ER

## 2019-06-08 PROCEDURE — 84484 ASSAY OF TROPONIN QUANT: CPT | Mod: 91

## 2019-06-08 PROCEDURE — 25000242 PHARM REV CODE 250 ALT 637 W/ HCPCS: Mod: ER | Performed by: EMERGENCY MEDICINE

## 2019-06-08 PROCEDURE — 93010 ELECTROCARDIOGRAM REPORT: CPT | Mod: ,,, | Performed by: INTERNAL MEDICINE

## 2019-06-08 PROCEDURE — 94760 N-INVAS EAR/PLS OXIMETRY 1: CPT | Mod: ER

## 2019-06-08 PROCEDURE — 25000003 PHARM REV CODE 250: Performed by: HOSPITALIST

## 2019-06-08 PROCEDURE — 94640 AIRWAY INHALATION TREATMENT: CPT | Mod: ER

## 2019-06-08 PROCEDURE — 93010 EKG 12-LEAD: ICD-10-PCS | Mod: ,,, | Performed by: INTERNAL MEDICINE

## 2019-06-08 PROCEDURE — 63600175 PHARM REV CODE 636 W HCPCS: Mod: ER | Performed by: INTERNAL MEDICINE

## 2019-06-08 PROCEDURE — 25000242 PHARM REV CODE 250 ALT 637 W/ HCPCS: Performed by: HOSPITALIST

## 2019-06-08 PROCEDURE — 96374 THER/PROPH/DIAG INJ IV PUSH: CPT | Mod: ER

## 2019-06-08 PROCEDURE — 99291 CRITICAL CARE FIRST HOUR: CPT | Mod: 25,ER

## 2019-06-08 PROCEDURE — 36415 COLL VENOUS BLD VENIPUNCTURE: CPT

## 2019-06-08 PROCEDURE — 85025 COMPLETE CBC W/AUTO DIFF WBC: CPT | Mod: ER

## 2019-06-08 PROCEDURE — 84484 ASSAY OF TROPONIN QUANT: CPT | Mod: ER

## 2019-06-08 PROCEDURE — 25000003 PHARM REV CODE 250: Performed by: EMERGENCY MEDICINE

## 2019-06-08 PROCEDURE — 96376 TX/PRO/DX INJ SAME DRUG ADON: CPT | Mod: ER

## 2019-06-08 PROCEDURE — G0378 HOSPITAL OBSERVATION PER HR: HCPCS

## 2019-06-08 RX ORDER — IPRATROPIUM BROMIDE AND ALBUTEROL SULFATE 2.5; .5 MG/3ML; MG/3ML
3 SOLUTION RESPIRATORY (INHALATION) ONCE
Status: COMPLETED | OUTPATIENT
Start: 2019-06-08 | End: 2019-06-08

## 2019-06-08 RX ORDER — DOXYCYCLINE HYCLATE 100 MG
100 TABLET ORAL EVERY 12 HOURS
Status: DISCONTINUED | OUTPATIENT
Start: 2019-06-08 | End: 2019-06-09 | Stop reason: HOSPADM

## 2019-06-08 RX ORDER — INSULIN ASPART 100 [IU]/ML
1-10 INJECTION, SOLUTION INTRAVENOUS; SUBCUTANEOUS
Status: DISCONTINUED | OUTPATIENT
Start: 2019-06-08 | End: 2019-06-09 | Stop reason: HOSPADM

## 2019-06-08 RX ORDER — MONTELUKAST SODIUM 10 MG/1
10 TABLET ORAL DAILY
Status: DISCONTINUED | OUTPATIENT
Start: 2019-06-08 | End: 2019-06-09 | Stop reason: HOSPADM

## 2019-06-08 RX ORDER — AMIODARONE HYDROCHLORIDE 200 MG/1
200 TABLET ORAL 2 TIMES DAILY
Status: DISCONTINUED | OUTPATIENT
Start: 2019-06-08 | End: 2019-06-09 | Stop reason: HOSPADM

## 2019-06-08 RX ORDER — CLOPIDOGREL BISULFATE 75 MG/1
75 TABLET ORAL DAILY
Status: DISCONTINUED | OUTPATIENT
Start: 2019-06-08 | End: 2019-06-09 | Stop reason: HOSPADM

## 2019-06-08 RX ORDER — FUROSEMIDE 10 MG/ML
40 INJECTION INTRAMUSCULAR; INTRAVENOUS
Status: COMPLETED | OUTPATIENT
Start: 2019-06-08 | End: 2019-06-08

## 2019-06-08 RX ORDER — PREDNISONE 20 MG/1
60 TABLET ORAL
Status: COMPLETED | OUTPATIENT
Start: 2019-06-08 | End: 2019-06-08

## 2019-06-08 RX ORDER — FUROSEMIDE 10 MG/ML
40 INJECTION INTRAMUSCULAR; INTRAVENOUS 2 TIMES DAILY
Status: DISCONTINUED | OUTPATIENT
Start: 2019-06-08 | End: 2019-06-08

## 2019-06-08 RX ORDER — ACETAMINOPHEN 325 MG/1
650 TABLET ORAL EVERY 8 HOURS PRN
Status: DISCONTINUED | OUTPATIENT
Start: 2019-06-08 | End: 2019-06-09 | Stop reason: HOSPADM

## 2019-06-08 RX ORDER — ONDANSETRON 8 MG/1
8 TABLET, ORALLY DISINTEGRATING ORAL EVERY 8 HOURS PRN
Status: DISCONTINUED | OUTPATIENT
Start: 2019-06-08 | End: 2019-06-09 | Stop reason: HOSPADM

## 2019-06-08 RX ORDER — CARVEDILOL 6.25 MG/1
6.25 TABLET ORAL 2 TIMES DAILY
Status: DISCONTINUED | OUTPATIENT
Start: 2019-06-08 | End: 2019-06-09 | Stop reason: HOSPADM

## 2019-06-08 RX ORDER — POLYETHYLENE GLYCOL 3350 17 G/17G
17 POWDER, FOR SOLUTION ORAL DAILY
Status: DISCONTINUED | OUTPATIENT
Start: 2019-06-08 | End: 2019-06-09 | Stop reason: HOSPADM

## 2019-06-08 RX ORDER — IPRATROPIUM BROMIDE AND ALBUTEROL SULFATE 2.5; .5 MG/3ML; MG/3ML
3 SOLUTION RESPIRATORY (INHALATION) EVERY 6 HOURS
Status: DISCONTINUED | OUTPATIENT
Start: 2019-06-08 | End: 2019-06-09 | Stop reason: HOSPADM

## 2019-06-08 RX ORDER — PRAVASTATIN SODIUM 10 MG/1
10 TABLET ORAL DAILY
Status: DISCONTINUED | OUTPATIENT
Start: 2019-06-08 | End: 2019-06-09 | Stop reason: HOSPADM

## 2019-06-08 RX ORDER — OXYCODONE AND ACETAMINOPHEN 5; 325 MG/1; MG/1
1 TABLET ORAL EVERY 4 HOURS PRN
Status: DISCONTINUED | OUTPATIENT
Start: 2019-06-08 | End: 2019-06-09 | Stop reason: HOSPADM

## 2019-06-08 RX ORDER — SODIUM CHLORIDE 0.9 % (FLUSH) 0.9 %
10 SYRINGE (ML) INJECTION
Status: DISCONTINUED | OUTPATIENT
Start: 2019-06-08 | End: 2019-06-09 | Stop reason: HOSPADM

## 2019-06-08 RX ORDER — LEVOTHYROXINE SODIUM 50 UG/1
50 TABLET ORAL
Status: DISCONTINUED | OUTPATIENT
Start: 2019-06-09 | End: 2019-06-09 | Stop reason: HOSPADM

## 2019-06-08 RX ORDER — IBUPROFEN 200 MG
24 TABLET ORAL
Status: DISCONTINUED | OUTPATIENT
Start: 2019-06-08 | End: 2019-06-09 | Stop reason: HOSPADM

## 2019-06-08 RX ORDER — IBUPROFEN 200 MG
16 TABLET ORAL
Status: DISCONTINUED | OUTPATIENT
Start: 2019-06-08 | End: 2019-06-09 | Stop reason: HOSPADM

## 2019-06-08 RX ORDER — FUROSEMIDE 10 MG/ML
20 INJECTION INTRAMUSCULAR; INTRAVENOUS 2 TIMES DAILY
Status: DISCONTINUED | OUTPATIENT
Start: 2019-06-08 | End: 2019-06-09 | Stop reason: HOSPADM

## 2019-06-08 RX ORDER — ENOXAPARIN SODIUM 100 MG/ML
40 INJECTION SUBCUTANEOUS EVERY 24 HOURS
Status: DISCONTINUED | OUTPATIENT
Start: 2019-06-08 | End: 2019-06-08

## 2019-06-08 RX ORDER — ENOXAPARIN SODIUM 100 MG/ML
1 INJECTION SUBCUTANEOUS
Status: DISCONTINUED | OUTPATIENT
Start: 2019-06-08 | End: 2019-06-09

## 2019-06-08 RX ORDER — IPRATROPIUM BROMIDE AND ALBUTEROL SULFATE 2.5; .5 MG/3ML; MG/3ML
3 SOLUTION RESPIRATORY (INHALATION) EVERY 4 HOURS PRN
Status: ACTIVE | OUTPATIENT
Start: 2019-06-08 | End: 2019-06-09

## 2019-06-08 RX ORDER — GLUCAGON 1 MG
1 KIT INJECTION
Status: DISCONTINUED | OUTPATIENT
Start: 2019-06-08 | End: 2019-06-09 | Stop reason: HOSPADM

## 2019-06-08 RX ORDER — FAMOTIDINE 20 MG/1
20 TABLET, FILM COATED ORAL DAILY
Status: DISCONTINUED | OUTPATIENT
Start: 2019-06-08 | End: 2019-06-09 | Stop reason: HOSPADM

## 2019-06-08 RX ADMIN — MONTELUKAST 10 MG: 10 TABLET, FILM COATED ORAL at 01:06

## 2019-06-08 RX ADMIN — IPRATROPIUM BROMIDE AND ALBUTEROL SULFATE 3 ML: .5; 3 SOLUTION RESPIRATORY (INHALATION) at 07:06

## 2019-06-08 RX ADMIN — DOXYCYCLINE HYCLATE 100 MG: 100 TABLET, COATED ORAL at 01:06

## 2019-06-08 RX ADMIN — FAMOTIDINE 20 MG: 20 TABLET ORAL at 01:06

## 2019-06-08 RX ADMIN — CARVEDILOL 6.25 MG: 6.25 TABLET, FILM COATED ORAL at 09:06

## 2019-06-08 RX ADMIN — OXYCODONE HYDROCHLORIDE AND ACETAMINOPHEN 1 TABLET: 5; 325 TABLET ORAL at 02:06

## 2019-06-08 RX ADMIN — IPRATROPIUM BROMIDE AND ALBUTEROL SULFATE 3 ML: .5; 3 SOLUTION RESPIRATORY (INHALATION) at 10:06

## 2019-06-08 RX ADMIN — ENOXAPARIN SODIUM 90 MG: 100 INJECTION SUBCUTANEOUS at 01:06

## 2019-06-08 RX ADMIN — IPRATROPIUM BROMIDE AND ALBUTEROL SULFATE 3 ML: .5; 3 SOLUTION RESPIRATORY (INHALATION) at 06:06

## 2019-06-08 RX ADMIN — INSULIN ASPART 8 UNITS: 100 INJECTION, SOLUTION INTRAVENOUS; SUBCUTANEOUS at 05:06

## 2019-06-08 RX ADMIN — AMIODARONE HYDROCHLORIDE 200 MG: 200 TABLET ORAL at 09:06

## 2019-06-08 RX ADMIN — PREDNISONE 60 MG: 20 TABLET ORAL at 06:06

## 2019-06-08 RX ADMIN — IPRATROPIUM BROMIDE AND ALBUTEROL SULFATE 3 ML: .5; 3 SOLUTION RESPIRATORY (INHALATION) at 12:06

## 2019-06-08 RX ADMIN — PRAVASTATIN SODIUM 10 MG: 10 TABLET ORAL at 01:06

## 2019-06-08 RX ADMIN — CLOPIDOGREL BISULFATE 75 MG: 75 TABLET ORAL at 01:06

## 2019-06-08 RX ADMIN — DOXYCYCLINE HYCLATE 100 MG: 100 TABLET, COATED ORAL at 09:06

## 2019-06-08 RX ADMIN — AMIODARONE HYDROCHLORIDE 200 MG: 200 TABLET ORAL at 01:06

## 2019-06-08 RX ADMIN — FUROSEMIDE 40 MG: 10 INJECTION, SOLUTION INTRAMUSCULAR; INTRAVENOUS at 06:06

## 2019-06-08 RX ADMIN — FUROSEMIDE 20 MG: 10 INJECTION, SOLUTION INTRAMUSCULAR; INTRAVENOUS at 05:06

## 2019-06-08 RX ADMIN — CARVEDILOL 6.25 MG: 6.25 TABLET, FILM COATED ORAL at 01:06

## 2019-06-08 RX ADMIN — OXYCODONE HYDROCHLORIDE AND ACETAMINOPHEN 1 TABLET: 5; 325 TABLET ORAL at 07:06

## 2019-06-08 RX ADMIN — INSULIN ASPART 5 UNITS: 100 INJECTION, SOLUTION INTRAVENOUS; SUBCUTANEOUS at 09:06

## 2019-06-08 RX ADMIN — POLYETHYLENE GLYCOL 3350 17 G: 17 POWDER, FOR SOLUTION ORAL at 01:06

## 2019-06-08 RX ADMIN — FUROSEMIDE 40 MG: 10 INJECTION, SOLUTION INTRAMUSCULAR; INTRAVENOUS at 07:06

## 2019-06-08 NOTE — ED PROVIDER NOTES
Encounter Date: 6/8/2019       History     Chief Complaint   Patient presents with    Shortness of Breath     intermittent since monday. Also SOB on exertion. Albuterol neb 45 minutes PTA.     63-year-old female presents to the emergency department complaining of progressively worsening shortness of breath x1 week.  She has a past medical history significant for CHF, diabetes mellitus type 2, asthma, cigarette smoking and hypertension.  She denies fever/chills, nausea/vomiting.  She states she most recently smoked cigarettes last night.    The history is provided by the patient. No  was used.   Shortness of Breath   This is a recurrent problem. The average episode lasts 1 week. The problem has been gradually worsening. Associated symptoms include wheezing. Pertinent negatives include no fever, no headaches, no sore throat, no neck pain, no syncope, no vomiting, no rash and no leg pain. She has tried beta-agonist inhalers for the symptoms. She has had prior hospitalizations. She has had prior ED visits. Associated medical issues include asthma, CAD and heart failure.     Review of patient's allergies indicates:   Allergen Reactions    Nut - unspecified Shortness Of Breath     Past Medical History:   Diagnosis Date    Asthma     Congestive heart failure     Diabetes mellitus     Type II    Hyperlipidemia     Pacemaker     Pedal edema      Past Surgical History:   Procedure Laterality Date    Cardiac bypass  1994    CARDIAC PACEMAKER PLACEMENT  2016     Family History   Problem Relation Age of Onset    Heart disease Father     Heart disease Mother     Cancer Mother         Bone and breast    Breast cancer Mother 50    Diabetes Brother     Diabetes Sister      Social History     Tobacco Use    Smoking status: Former Smoker    Smokeless tobacco: Never Used   Substance Use Topics    Alcohol use: No    Drug use: No     Review of Systems   Constitutional: Negative for fever.   HENT:  Negative for sore throat.    Respiratory: Positive for shortness of breath and wheezing.    Cardiovascular: Negative for syncope.   Gastrointestinal: Negative for vomiting.   Musculoskeletal: Negative for neck pain.   Skin: Negative for rash.   Neurological: Negative for headaches.   All other systems reviewed and are negative.      Physical Exam     Initial Vitals   BP Pulse Resp Temp SpO2   06/08/19 0607 06/08/19 0607 06/08/19 0644 06/08/19 0607 06/08/19 0607   116/69 67 14 97.6 °F (36.4 °C) 96 %      MAP       --                Physical Exam    Nursing note and vitals reviewed.  Constitutional: She appears well-developed. She is not diaphoretic. No distress.   Obese   HENT:   Head: Normocephalic and atraumatic.   Right Ear: External ear normal.   Left Ear: External ear normal.   Nose: Nose normal.   Eyes: Conjunctivae are normal. Pupils are equal, round, and reactive to light.   Neck: Normal range of motion. Neck supple. JVD present.   Cardiovascular: Normal rate and regular rhythm.   Pulmonary/Chest: She has wheezes (Expiratory). She has no rhonchi. She has no rales. She exhibits no tenderness.   Abdominal: Soft. Bowel sounds are normal.   Musculoskeletal: Normal range of motion.   Neurological: She is alert and oriented to person, place, and time. She has normal strength.   Skin: Skin is warm and dry.   Psychiatric: She has a normal mood and affect. Thought content normal.         ED Course   Critical Care  Date/Time: 6/8/2019 7:24 AM  Performed by: Urvashi Lauren DO  Authorized by: Ivan Montoya MD   Direct patient critical care time: 10 minutes  Additional history critical care time: 10 minutes  Ordering / reviewing critical care time: 10 minutes  Documentation critical care time: 10 minutes  Consulting other physicians critical care time: 6 minutes  Total critical care time (exclusive of procedural time) : 46 minutes  Critical care was necessary to treat or prevent imminent or life-threatening  deterioration of the following conditions: cardiac failure, endocrine crisis, respiratory failure, renal failure, metabolic crisis and dehydration.  Critical care was time spent personally by me on the following activities: evaluation of patient's response to treatment, obtaining history from patient or surrogate, ordering and review of laboratory studies, pulse oximetry, review of old charts, examination of patient, ordering and performing treatments and interventions, ordering and review of radiographic studies and re-evaluation of patient's condition.        Labs Reviewed   ISTAT PROCEDURE - Abnormal; Notable for the following components:       Result Value    POC PTWBT 15.0 (*)     POC PTINR 1.3 (*)     All other components within normal limits   POCT CMP - Abnormal; Notable for the following components:    AST (SGOT), POC 70 (*)     POC BUN 26 (*)     POC Chloride 96 (*)     POC Creatinine 2.7 (*)     All other components within normal limits   POCT B-TYPE NATRIURETIC PEPTIDE (BNP) - Abnormal; Notable for the following components:    POC B-Type Natriuretic Peptide 1950 (*)     All other components within normal limits   POCT D DIMER - Abnormal; Notable for the following components:    POC D- (*)     All other components within normal limits   TROPONIN ISTAT   POCT CBC   POCT CMP   POCT TROPONIN   POCT B-TYPE NATRIURETIC PEPTIDE (BNP)   POCT D DIMER   POCT PROTIME-INR         EKG Readings: (Independently Interpreted)   Rhythm: Normal Sinus Rhythm. Heart Rate: 65. Clinical Impression: with LBBB       Imaging Results           US Lower Extremity Veins Bilateral (Final result)  Result time 06/08/19 10:36:30    Final result by Saul Doyle MD (06/08/19 10:36:30)                 Impression:      Findings are compatible with left common femoral vein nonocclusive thrombus.  Patient is reportedly on anticoagulation.    This report was flagged in Epic as abnormal.      Electronically signed by: Saul Doyle  MD  Date:    06/08/2019  Time:    10:36             Narrative:    EXAMINATION:  US LOWER EXTREMITY VEINS BILATERAL    CLINICAL HISTORY:  Other specified soft tissue disorders    TECHNIQUE:  Duplex and color flow Doppler and dynamic compression was performed of the bilateral lower extremity veins was performed.    COMPARISON:  None    FINDINGS:  Right thigh veins: The common femoral, femoral, popliteal, upper greater saphenous, and deep femoral veins are patent and free of thrombus. The veins are normally compressible and have normal phasic flow and augmentation response.    Right calf veins: The visualized calf veins are patent.  Please note that the posterior tibial main in the calf is not visualized.    Left thigh veins: The femoral, popliteal, upper greater saphenous, and deep femoral veins are patent and free of thrombus. The veins are normally compressible and have normal phasic flow and augmentation response.  The left common femoral vein is not fully compressible and there is low level increased echogenicity within the vessel compatible with thrombus.  Flow is demonstrated within the vessel.    Left calf veins: The visualized calf veins are patent.    Miscellaneous: None                               X-Ray Chest AP Portable (Final result)  Result time 06/08/19 07:09:00    Final result by Rajat Garcia MD (06/08/19 07:09:00)                 Impression:      Radiographic findings suggestive of edema/CHF.      Electronically signed by: Rajat Garcia MD  Date:    06/08/2019  Time:    07:09             Narrative:    EXAMINATION:  XR CHEST AP PORTABLE    CLINICAL HISTORY:  shortness of breath;    TECHNIQUE:  Single frontal view of the chest was performed.    COMPARISON:  05/22/2019    FINDINGS:  Cardiac monitoring leads overlie the chest.  There is a left chest wall cardiac pacing device present.  There is postoperative change of prior median sternotomy.  Cardiomediastinal silhouette is enlarged.  There is  prominence of the central pulmonary vasculature and mildly increased interstitial attenuation bilaterally.  No new large confluent airspace consolidation identified.  No evidence of pleural effusion or pneumothorax.  The visualized osseous structures appear intact.                                 Medical Decision Making:   Initial Assessment:   63-year-old female presents to the emergency department complaining of progressively worsening shortness of breath x1 week.  She has a past medical history significant for CHF, diabetes mellitus type 2, asthma, cigarette smoking and hypertension.  She denies fever/chills, nausea/vomiting.  She states she most recently smoked cigarettes last night.    Clinical Tests:   Lab Tests: Ordered  Radiological Study: Ordered  ED Management:  CBC was within normal limits. BNP was 1950.  Troponin was 0.  Patient received DuoNeb, prednisone and Lasix in the emergency department.  She was counseled on smoking cessation.  Care of this patient was transferred to Dr. Lauren at 7:00 a.m..    Chief complaint:  Shortness of breath getting slowly worse over 1 week.  Differential diagnosis:  Respiratory insufficiency, CHF exacerbation, COPD, pneumonia, bronchitis, pneumothorax,  hyperglycemia, electrolyte imbalance.  Treatment in the ED Physical Exam, the prednisone, Lasix, for a DuoNeb treatment.  Patient reports feeling a little better after medication.    On exam patient with expiratory wheeze will give another DuoNeb treatment.  Patient also takes 80 of Lasix daily will give a 2nd dose 40 mg IV Lasix at this time  Discussed labs, and imaging results with the patient.    Unable to obtain CT PE study secondary to creatinine of 2.7.  Ultrasound shows left common femoral vein nonocclusive thrombus..  Patient and  are agreeable to transfer & admission at [facility].    I spoke with surinder Mills at 7:15 a.m.  Requesting consultation with Internal Medicine for inpatient  admit.   Discussed patient's presentation, past medical history, physical exam, and ED course.  Consultation with DR Renner for admission.  Also discussed vital signs and diagnosis of CHF exacerbation, COPD exacerbation, current tobacco use, and elevated D-dimer.  Due to elevated D-dimer will obtain ultrasound lower extremities. At this time patient will be transferred & admitted.    Ultrasound resulted showing nonocclusive thrombosis left leg.  Paged .  Discussed ultrasound results with him.  Admit orders completed as requested.  Admitting physician states he will review meds and determine if Lovenox needs to be added for treatment of nonocclusive thrombus.  At time of transfer patient is awake alert oriented x4 speaking clearly in full sentences and moving all 4 extremities.                   Clinical Impression:       ICD-10-CM ICD-9-CM   1. Bronchitis, chronic obstructive w acute bronchitis J44.0 491.22   2. HF (heart failure) I50.9 428.9   3. Continuous dependence on cigarette smoking F17.210 305.1   4. CKD (chronic kidney disease), stage IV N18.4 585.4   5. Acute on chronic combined systolic and diastolic congestive heart failure I50.43 428.43     428.0   6. LBBB (left bundle branch block) I44.7 426.3   7. AICD (automatic cardioverter/defibrillator) present Z95.810 V45.02   8. Leg swelling M79.89 729.81   9. CHF (congestive heart failure) I50.9 428.0   10. (HFpEF) heart failure with preserved ejection fraction I50.30 428.9   11. Venous embolism and thrombosis of deep vessels of distal leg, left I82.4Z2 453.42                                Urvashi Lauren,   06/08/19 6320

## 2019-06-08 NOTE — H&P
[facility] Hospital Medicine  History & Physical    Patient Name: Charu Oswald  MRN: [id number]  Admission Date: 6/8/2019  Attending Physician: Fadi Rivera MD   Primary Care Provider: Gerda Carter MD         Patient information was obtained from patient and ER records.     Subjective:     Principal Problem:Chronic obstructive pulmonary disease with acute exacerbation    Chief Complaint:   Chief Complaint   Patient presents with    Shortness of Breath     intermittent since monday. Also SOB on exertion. Albuterol neb 45 minutes PTA.        HPI: 63-year-old female with history of CHF,COPD,tobacco abuse,HTN,hyperlipidemia,CKD 4,DM, presents to the emergency department complaining of progressively worsening shortness of breath x1 week. Did not improved with home nebulizer,she has non productive cough with no fever, She denies chills, nausea/vomiting. She states she most recently smoked cigarettes last night.chest X ray show vascular congestion,patient has been  started on IV lasix, and nebulizer,she has also left femural DVT,started on Lovenox,she is stable currency on NC O 2,    Past Medical History:   Diagnosis Date    Asthma     Congestive heart failure     Diabetes mellitus     Type II    Hyperlipidemia     Pacemaker     Pedal edema        Past Surgical History:   Procedure Laterality Date    Cardiac bypass  1994    CARDIAC PACEMAKER PLACEMENT  2016       Review of patient's allergies indicates:   Allergen Reactions    Nut - unspecified Shortness Of Breath       No current facility-administered medications on file prior to encounter.      Current Outpatient Medications on File Prior to Encounter   Medication Sig    albuterol-ipratropium (DUO-NEB) 2.5 mg-0.5 mg/3 mL nebulizer solution Take 3 mLs by nebulization every 6 (six) hours while awake. Rescue    amiodarone (PACERONE) 200 MG Tab TAKE 1 TABLET BY MOUTH IN THE MORNING AND A HALF TABLET BY MOUTH IN THE EVENING     carvedilol (COREG) 6.25 MG tablet TK 1 T PO  BID    clopidogrel (PLAVIX) 75 mg tablet Take 75 mg by mouth once daily.    dicyclomine (BENTYL) 20 mg tablet Take 1 tablet (20 mg total) by mouth 2 (two) times daily. for 15 days    fluticasone propionate (FLONASE) 50 mcg/actuation nasal spray 2 sprays (100 mcg total) by Each Nare route once daily.    furosemide (LASIX) 40 MG tablet 1 TABLET BY MOUTH DAILY MAY TAKE TAB IN THE EVENING IF FOR SHORTNESS OF BREATH OR 5LB WEIGHT GAIN    levothyroxine (SYNTHROID) 50 MCG tablet Take 50 mcg by mouth once daily.    loratadine (CLARITIN) 10 mg tablet Take 1 tablet (10 mg total) by mouth once daily.    montelukast (SINGULAIR) 10 mg tablet Take 1 tablet (10 mg total) by mouth every evening.    potassium chloride SA (K-DUR,KLOR-CON) 10 MEQ tablet Take 10 mEq by mouth every other day.    pravastatin (PRAVACHOL) 10 MG tablet TK 1 T PO QHS     Family History     Problem Relation (Age of Onset)    Breast cancer Mother (50)    Cancer Mother    Diabetes Brother, Sister    Heart disease Father, Mother        Tobacco Use    Smoking status: Former Smoker    Smokeless tobacco: Never Used   Substance and Sexual Activity    Alcohol use: No    Drug use: No    Sexual activity: Never     Partners: Male     Review of Systems   Constitutional: Negative for activity change and appetite change.   HENT: Negative for congestion and dental problem.    Respiratory: Positive for cough, shortness of breath and wheezing.    Cardiovascular: Negative for chest pain and leg swelling.   Gastrointestinal: Negative for abdominal distention and abdominal pain.   Endocrine: Negative for cold intolerance and heat intolerance.   Genitourinary: Negative for difficulty urinating.   Musculoskeletal: Negative for back pain.   Skin: Negative for color change and pallor.   Allergic/Immunologic: Negative for environmental allergies and food allergies.   Neurological: Negative for dizziness and facial asymmetry.    Hematological: Negative for adenopathy. Does not bruise/bleed easily.   Psychiatric/Behavioral: Negative for agitation.     Objective:     Vital Signs (Most Recent):  Temp: 97.7 °F (36.5 °C) (06/08/19 1119)  Pulse: 72 (06/08/19 1119)  Resp: 17 (06/08/19 1119)  BP: 125/72 (06/08/19 1119)  SpO2: 97 % (06/08/19 1119) Vital Signs (24h Range):  Temp:  [97.6 °F (36.4 °C)-97.7 °F (36.5 °C)] 97.7 °F (36.5 °C)  Pulse:  [64-80] 72  Resp:  [14-24] 17  SpO2:  [94 %-97 %] 97 %  BP: (116-137)/(66-80) 125/72     Weight: 87.5 kg (193 lb)  Body mass index is 35.3 kg/m².    Physical Exam   Constitutional: She is oriented to person, place, and time. No distress.   HENT:   Head: Atraumatic.   Eyes: EOM are normal.   Neck: Normal range of motion. Neck supple.   Cardiovascular: Normal rate and regular rhythm.   Pulmonary/Chest: Effort normal.   Abdominal: Soft. Bowel sounds are normal.   Musculoskeletal: Normal range of motion. She exhibits no edema.   Neurological: She is oriented to person, place, and time. No cranial nerve deficit. Coordination normal.   Skin: Skin is warm and dry.   Psychiatric: She has a normal mood and affect. Her behavior is normal.         CRANIAL NERVES     CN III, IV, VI   Extraocular motions are normal.        Significant Labs: BMP: No results for input(s): GLU, NA, K, CL, CO2, BUN, CREATININE, CALCIUM, MG in the last 48 hours.  CBC: No results for input(s): WBC, HGB, HCT, PLT in the last 48 hours.    Significant Imaging: reviewed.    Assessment/Plan:     * Chronic obstructive pulmonary disease with acute exacerbation  Will continue with nebulizer,she is still smoking,      Acute on chronic combined systolic and diastolic CHF (congestive heart failure)  Will continue with IV lasix,check Echo,not on ACE or ARB duo to CKD 4,      Deep vein thrombosis (DVT) of femoral vein of left lower extremity  Per US,started on lovenox,      Obesity (BMI 30.0-34.9)  Weight lose as out patient.      Tobacco abuse  Consulted  need smoking over 5 minutes,      DM (diabetes mellitus), secondary, uncontrolled, w/renal complications  On SSI.      Hypercholesteremia  On stain.      Ischemic cardiomyopathy  On medical treatment.      CKD (chronic kidney disease), stage IV  Will be monitored,      AICD (automatic cardioverter/defibrillator) present  monitor in Tele,        VTE Risk Mitigation (From admission, onward)        Ordered     enoxaparin injection 90 mg  Every 24 hours (non-standard times)      06/08/19 1154     IP VTE HIGH RISK PATIENT  Once      06/08/19 1149             Fadi Rivera MD  Department of Hospital Medicine   [facility]

## 2019-06-08 NOTE — SUBJECTIVE & OBJECTIVE
Past Medical History:   Diagnosis Date    Asthma     Congestive heart failure     Diabetes mellitus     Type II    Hyperlipidemia     Pacemaker     Pedal edema        Past Surgical History:   Procedure Laterality Date    Cardiac bypass  1994    CARDIAC PACEMAKER PLACEMENT  2016       Review of patient's allergies indicates:   Allergen Reactions    Nut - unspecified Shortness Of Breath       No current facility-administered medications on file prior to encounter.      Current Outpatient Medications on File Prior to Encounter   Medication Sig    albuterol-ipratropium (DUO-NEB) 2.5 mg-0.5 mg/3 mL nebulizer solution Take 3 mLs by nebulization every 6 (six) hours while awake. Rescue    amiodarone (PACERONE) 200 MG Tab TAKE 1 TABLET BY MOUTH IN THE MORNING AND A HALF TABLET BY MOUTH IN THE EVENING    carvedilol (COREG) 6.25 MG tablet TK 1 T PO  BID    clopidogrel (PLAVIX) 75 mg tablet Take 75 mg by mouth once daily.    dicyclomine (BENTYL) 20 mg tablet Take 1 tablet (20 mg total) by mouth 2 (two) times daily. for 15 days    fluticasone propionate (FLONASE) 50 mcg/actuation nasal spray 2 sprays (100 mcg total) by Each Nare route once daily.    furosemide (LASIX) 40 MG tablet 1 TABLET BY MOUTH DAILY MAY TAKE TAB IN THE EVENING IF FOR SHORTNESS OF BREATH OR 5LB WEIGHT GAIN    levothyroxine (SYNTHROID) 50 MCG tablet Take 50 mcg by mouth once daily.    loratadine (CLARITIN) 10 mg tablet Take 1 tablet (10 mg total) by mouth once daily.    montelukast (SINGULAIR) 10 mg tablet Take 1 tablet (10 mg total) by mouth every evening.    potassium chloride SA (K-DUR,KLOR-CON) 10 MEQ tablet Take 10 mEq by mouth every other day.    pravastatin (PRAVACHOL) 10 MG tablet TK 1 T PO QHS     Family History     Problem Relation (Age of Onset)    Breast cancer Mother (50)    Cancer Mother    Diabetes Brother, Sister    Heart disease Father, Mother        Tobacco Use    Smoking status: Former Smoker    Smokeless tobacco:  Never Used   Substance and Sexual Activity    Alcohol use: No    Drug use: No    Sexual activity: Never     Partners: Male     Review of Systems   Constitutional: Negative for activity change and appetite change.   HENT: Negative for congestion and dental problem.    Respiratory: Positive for cough, shortness of breath and wheezing.    Cardiovascular: Negative for chest pain and leg swelling.   Gastrointestinal: Negative for abdominal distention and abdominal pain.   Endocrine: Negative for cold intolerance and heat intolerance.   Genitourinary: Negative for difficulty urinating.   Musculoskeletal: Negative for back pain.   Skin: Negative for color change and pallor.   Allergic/Immunologic: Negative for environmental allergies and food allergies.   Neurological: Negative for dizziness and facial asymmetry.   Hematological: Negative for adenopathy. Does not bruise/bleed easily.   Psychiatric/Behavioral: Negative for agitation.     Objective:     Vital Signs (Most Recent):  Temp: 97.7 °F (36.5 °C) (06/08/19 1119)  Pulse: 72 (06/08/19 1119)  Resp: 17 (06/08/19 1119)  BP: 125/72 (06/08/19 1119)  SpO2: 97 % (06/08/19 1119) Vital Signs (24h Range):  Temp:  [97.6 °F (36.4 °C)-97.7 °F (36.5 °C)] 97.7 °F (36.5 °C)  Pulse:  [64-80] 72  Resp:  [14-24] 17  SpO2:  [94 %-97 %] 97 %  BP: (116-137)/(66-80) 125/72     Weight: 87.5 kg (193 lb)  Body mass index is 35.3 kg/m².    Physical Exam   Constitutional: She is oriented to person, place, and time. No distress.   HENT:   Head: Atraumatic.   Eyes: EOM are normal.   Neck: Normal range of motion. Neck supple.   Cardiovascular: Normal rate and regular rhythm.   Pulmonary/Chest: Effort normal.   Abdominal: Soft. Bowel sounds are normal.   Musculoskeletal: Normal range of motion. She exhibits no edema.   Neurological: She is oriented to person, place, and time. No cranial nerve deficit. Coordination normal.   Skin: Skin is warm and dry.   Psychiatric: She has a normal mood and  affect. Her behavior is normal.         CRANIAL NERVES     CN III, IV, VI   Extraocular motions are normal.        Significant Labs: BMP: No results for input(s): GLU, NA, K, CL, CO2, BUN, CREATININE, CALCIUM, MG in the last 48 hours.  CBC: No results for input(s): WBC, HGB, HCT, PLT in the last 48 hours.    Significant Imaging: reviewed.

## 2019-06-08 NOTE — ED NOTES
Reports she has been having intermittent SOB since Monday. Reports she took an albuterol neb Tx 45 minutes PTA. Reports she started taking Chantix 2 days ago and stopped taking it yesterday. Reports that her last cigarette was last night. Has a history of Asthma, HF, DM type 2, acute kidney disease and V-fib with AICD placed. Also had CABG in 1994. Reports intermittent cough since Monday. C/O back and bilateral flank pain related to coughing.

## 2019-06-08 NOTE — HPI
63-year-old female with history of CHF,COPD,tobacco abuse,HTN,hyperlipidemia,CKD 4,DM, presents to the emergency department complaining of progressively worsening shortness of breath x1 week. Did not improved with home nebulizer,she has non productive cough with no fever, She denies chills, nausea/vomiting. She states she most recently smoked cigarettes last night.chest X ray show vascular congestion,patient has been  started on IV lasix, and nebulizer,she has also left femural DVT,started on Lovenox,she is stable currency on NC O 2,

## 2019-06-08 NOTE — ED NOTES
Spoke with charge nurse on 3rd floor and reports bed has been assigned and was waiting on staff. Notified to call report to Katie.    Notified primary nurse Eloisa to call report. Eloisa stated understanding

## 2019-06-08 NOTE — NURSING
Assumed the care of patient from the ED report given to me by nurse Katie BRUSH.Patient awake alert and oriented in no apparent distress. Respirations non-labored and placed on 02 2l per n/c.

## 2019-06-08 NOTE — NURSING
Pt with c/o rt side and lower back pain which is chronic. She is refusing tylenol for pain. Dr. Renner notified. Ordered percocett for the patient.

## 2019-06-09 VITALS
SYSTOLIC BLOOD PRESSURE: 128 MMHG | HEIGHT: 62 IN | RESPIRATION RATE: 17 BRPM | WEIGHT: 204 LBS | OXYGEN SATURATION: 97 % | HEART RATE: 72 BPM | TEMPERATURE: 98 F | DIASTOLIC BLOOD PRESSURE: 70 MMHG | BODY MASS INDEX: 37.54 KG/M2

## 2019-06-09 PROBLEM — J20.9 BRONCHITIS, CHRONIC OBSTRUCTIVE W ACUTE BRONCHITIS: Status: ACTIVE | Noted: 2019-06-09

## 2019-06-09 PROBLEM — J44.0 BRONCHITIS, CHRONIC OBSTRUCTIVE W ACUTE BRONCHITIS: Status: ACTIVE | Noted: 2019-06-09

## 2019-06-09 LAB
ALBUMIN SERPL BCP-MCNC: 3.4 G/DL (ref 3.5–5.2)
ALP SERPL-CCNC: 108 U/L (ref 55–135)
ALT SERPL W/O P-5'-P-CCNC: 20 U/L (ref 10–44)
ANION GAP SERPL CALC-SCNC: 11 MMOL/L (ref 8–16)
AORTIC ROOT ANNULUS: 2.89 CM
AORTIC VALVE CUSP SEPERATION: 2.18 CM
AST SERPL-CCNC: 16 U/L (ref 10–40)
AV INDEX (PROSTH): 0.87
AV MEAN GRADIENT: 3.44 MMHG
AV PEAK GRADIENT: 5.66 MMHG
AV VALVE AREA: 2.73 CM2
AV VELOCITY RATIO: 0.86
BASOPHILS # BLD AUTO: 0 K/UL (ref 0–0.2)
BASOPHILS NFR BLD: 0 % (ref 0–1.9)
BILIRUB SERPL-MCNC: 0.3 MG/DL (ref 0.1–1)
BSA FOR ECHO PROCEDURE: 2.01 M2
BUN SERPL-MCNC: 36 MG/DL (ref 8–23)
CALCIUM SERPL-MCNC: 9.6 MG/DL (ref 8.7–10.5)
CHLORIDE SERPL-SCNC: 96 MMOL/L (ref 95–110)
CO2 SERPL-SCNC: 28 MMOL/L (ref 23–29)
CREAT SERPL-MCNC: 3.1 MG/DL (ref 0.5–1.4)
CV ECHO LV RWT: 0.51 CM
DIFFERENTIAL METHOD: ABNORMAL
DOP CALC AO PEAK VEL: 1.19 M/S
DOP CALC AO VTI: 26.19 CM
DOP CALC LVOT AREA: 3.14 CM2
DOP CALC LVOT DIAMETER: 2 CM
DOP CALC LVOT PEAK VEL: 1.02 M/S
DOP CALC LVOT STROKE VOLUME: 71.4 CM3
DOP CALCLVOT PEAK VEL VTI: 22.74 CM
E WAVE DECELERATION TIME: 174.62 MSEC
E/A RATIO: 2.67
E/E' RATIO: 16
ECHO LV POSTERIOR WALL: 1.21 CM (ref 0.6–1.1)
EOSINOPHIL # BLD AUTO: 0 K/UL (ref 0–0.5)
EOSINOPHIL NFR BLD: 0 % (ref 0–8)
ERYTHROCYTE [DISTWIDTH] IN BLOOD BY AUTOMATED COUNT: 16.1 % (ref 11.5–14.5)
EST. GFR  (AFRICAN AMERICAN): 18 ML/MIN/1.73 M^2
EST. GFR  (NON AFRICAN AMERICAN): 15 ML/MIN/1.73 M^2
ESTIMATED AVG GLUCOSE: 163 MG/DL (ref 68–131)
FRACTIONAL SHORTENING: 17 % (ref 28–44)
GIANT PLATELETS BLD QL SMEAR: PRESENT
GLUCOSE SERPL-MCNC: 203 MG/DL (ref 70–110)
HBA1C MFR BLD HPLC: 7.3 % (ref 4–5.6)
HCT VFR BLD AUTO: 36.9 % (ref 37–48.5)
HGB BLD-MCNC: 11.5 G/DL (ref 12–16)
INTERVENTRICULAR SEPTUM: 1.22 CM (ref 0.6–1.1)
IVRT: 0.12 MSEC
LA MAJOR: 5.72 CM
LA MINOR: 5.82 CM
LA WIDTH: 3.94 CM
LEFT ATRIUM SIZE: 4.6 CM
LEFT ATRIUM VOLUME INDEX: 46.1 ML/M2
LEFT ATRIUM VOLUME: 88.88 CM3
LEFT INTERNAL DIMENSION IN SYSTOLE: 3.96 CM (ref 2.1–4)
LEFT VENTRICLE DIASTOLIC VOLUME INDEX: 55.07 ML/M2
LEFT VENTRICLE DIASTOLIC VOLUME: 106.15 ML
LEFT VENTRICLE MASS INDEX: 114.6 G/M2
LEFT VENTRICLE SYSTOLIC VOLUME INDEX: 35.4 ML/M2
LEFT VENTRICLE SYSTOLIC VOLUME: 68.28 ML
LEFT VENTRICULAR INTERNAL DIMENSION IN DIASTOLE: 4.77 CM (ref 3.5–6)
LEFT VENTRICULAR MASS: 220.84 G
LV LATERAL E/E' RATIO: 12.44
LV SEPTAL E/E' RATIO: 22.4
LYMPHOCYTES # BLD AUTO: 1.4 K/UL (ref 1–4.8)
LYMPHOCYTES NFR BLD: 16.6 % (ref 18–48)
MCH RBC QN AUTO: 21.6 PG (ref 27–31)
MCHC RBC AUTO-ENTMCNC: 31.2 G/DL (ref 32–36)
MCV RBC AUTO: 69 FL (ref 82–98)
MONOCYTES # BLD AUTO: 0.5 K/UL (ref 0.3–1)
MONOCYTES NFR BLD: 6.4 % (ref 4–15)
MV PEAK A VEL: 0.42 M/S
MV PEAK E VEL: 1.12 M/S
NEUTROPHILS # BLD AUTO: 6.4 K/UL (ref 1.8–7.7)
NEUTROPHILS NFR BLD: 77.2 % (ref 38–73)
PISA TR MAX VEL: 3.23 M/S
PLATELET # BLD AUTO: 258 K/UL (ref 150–350)
PLATELET BLD QL SMEAR: ABNORMAL
PMV BLD AUTO: 11 FL (ref 9.2–12.9)
POCT GLUCOSE: 186 MG/DL (ref 70–110)
POCT GLUCOSE: 270 MG/DL (ref 70–110)
POTASSIUM SERPL-SCNC: 4.8 MMOL/L (ref 3.5–5.1)
PROT SERPL-MCNC: 6.5 G/DL (ref 6–8.4)
PULM VEIN S/D RATIO: 0.78
PV PEAK D VEL: 0.4 M/S
PV PEAK S VEL: 0.31 M/S
PV PEAK VELOCITY: 0.69 CM/S
RA MAJOR: 5.73 CM
RA PRESSURE: 15 MMHG
RA WIDTH: 4.46 CM
RBC # BLD AUTO: 5.33 M/UL (ref 4–5.4)
RIGHT VENTRICULAR END-DIASTOLIC DIMENSION: 4.72 CM
RV TISSUE DOPPLER FREE WALL SYSTOLIC VELOCITY 1 (APICAL 4 CHAMBER VIEW): 5.97 M/S
SINUS: 2.58 CM
SODIUM SERPL-SCNC: 135 MMOL/L (ref 136–145)
STJ: 2.4 CM
TDI LATERAL: 0.09
TDI SEPTAL: 0.05
TDI: 0.07
TR MAX PG: 41.73 MMHG
TRICUSPID ANNULAR PLANE SYSTOLIC EXCURSION: 1.52 CM
TV REST PULMONARY ARTERY PRESSURE: 57 MMHG
WBC # BLD AUTO: 8.33 K/UL (ref 3.9–12.7)

## 2019-06-09 PROCEDURE — 85025 COMPLETE CBC W/AUTO DIFF WBC: CPT

## 2019-06-09 PROCEDURE — 36415 COLL VENOUS BLD VENIPUNCTURE: CPT

## 2019-06-09 PROCEDURE — 25000003 PHARM REV CODE 250: Performed by: EMERGENCY MEDICINE

## 2019-06-09 PROCEDURE — 96372 THER/PROPH/DIAG INJ SC/IM: CPT | Mod: 59

## 2019-06-09 PROCEDURE — 96376 TX/PRO/DX INJ SAME DRUG ADON: CPT

## 2019-06-09 PROCEDURE — 94761 N-INVAS EAR/PLS OXIMETRY MLT: CPT

## 2019-06-09 PROCEDURE — 63600175 PHARM REV CODE 636 W HCPCS: Performed by: HOSPITALIST

## 2019-06-09 PROCEDURE — 97161 PT EVAL LOW COMPLEX 20 MIN: CPT | Performed by: PHYSICAL THERAPIST

## 2019-06-09 PROCEDURE — 94640 AIRWAY INHALATION TREATMENT: CPT

## 2019-06-09 PROCEDURE — 25000242 PHARM REV CODE 250 ALT 637 W/ HCPCS: Performed by: HOSPITALIST

## 2019-06-09 PROCEDURE — 27000221 HC OXYGEN, UP TO 24 HOURS

## 2019-06-09 PROCEDURE — 97165 OT EVAL LOW COMPLEX 30 MIN: CPT

## 2019-06-09 PROCEDURE — 25000003 PHARM REV CODE 250: Performed by: HOSPITALIST

## 2019-06-09 PROCEDURE — 94760 N-INVAS EAR/PLS OXIMETRY 1: CPT

## 2019-06-09 PROCEDURE — 80053 COMPREHEN METABOLIC PANEL: CPT

## 2019-06-09 PROCEDURE — G0378 HOSPITAL OBSERVATION PER HR: HCPCS

## 2019-06-09 RX ORDER — DICYCLOMINE HYDROCHLORIDE 20 MG/1
20 TABLET ORAL 2 TIMES DAILY
Qty: 30 TABLET | Refills: 0
Start: 2019-06-09 | End: 2019-06-24

## 2019-06-09 RX ORDER — FUROSEMIDE 40 MG/1
40 TABLET ORAL DAILY
Qty: 30 TABLET | Refills: 0 | Status: ON HOLD | OUTPATIENT
Start: 2019-06-09 | End: 2019-08-11 | Stop reason: HOSPADM

## 2019-06-09 RX ORDER — ENOXAPARIN SODIUM 100 MG/ML
1 INJECTION SUBCUTANEOUS
Status: DISCONTINUED | OUTPATIENT
Start: 2019-06-09 | End: 2019-06-09 | Stop reason: HOSPADM

## 2019-06-09 RX ORDER — DICYCLOMINE HYDROCHLORIDE 20 MG/1
20 TABLET ORAL 2 TIMES DAILY
Qty: 30 TABLET | Refills: 0 | Status: SHIPPED | OUTPATIENT
Start: 2019-06-09 | End: 2019-06-09

## 2019-06-09 RX ADMIN — CLOPIDOGREL BISULFATE 75 MG: 75 TABLET ORAL at 10:06

## 2019-06-09 RX ADMIN — INSULIN ASPART 2 UNITS: 100 INJECTION, SOLUTION INTRAVENOUS; SUBCUTANEOUS at 08:06

## 2019-06-09 RX ADMIN — FAMOTIDINE 20 MG: 20 TABLET ORAL at 10:06

## 2019-06-09 RX ADMIN — ENOXAPARIN SODIUM 90 MG: 100 INJECTION SUBCUTANEOUS at 10:06

## 2019-06-09 RX ADMIN — DOXYCYCLINE HYCLATE 100 MG: 100 TABLET, COATED ORAL at 10:06

## 2019-06-09 RX ADMIN — PRAVASTATIN SODIUM 10 MG: 10 TABLET ORAL at 10:06

## 2019-06-09 RX ADMIN — POLYETHYLENE GLYCOL 3350 17 G: 17 POWDER, FOR SOLUTION ORAL at 10:06

## 2019-06-09 RX ADMIN — FUROSEMIDE 20 MG: 10 INJECTION, SOLUTION INTRAMUSCULAR; INTRAVENOUS at 10:06

## 2019-06-09 RX ADMIN — MONTELUKAST 10 MG: 10 TABLET, FILM COATED ORAL at 10:06

## 2019-06-09 RX ADMIN — IPRATROPIUM BROMIDE AND ALBUTEROL SULFATE 3 ML: .5; 3 SOLUTION RESPIRATORY (INHALATION) at 12:06

## 2019-06-09 RX ADMIN — CARVEDILOL 6.25 MG: 6.25 TABLET, FILM COATED ORAL at 10:06

## 2019-06-09 RX ADMIN — IPRATROPIUM BROMIDE AND ALBUTEROL SULFATE 3 ML: .5; 3 SOLUTION RESPIRATORY (INHALATION) at 07:06

## 2019-06-09 RX ADMIN — LEVOTHYROXINE SODIUM 50 MCG: 50 TABLET ORAL at 06:06

## 2019-06-09 RX ADMIN — AMIODARONE HYDROCHLORIDE 200 MG: 200 TABLET ORAL at 10:06

## 2019-06-09 NOTE — PROGRESS NOTES
Patient to discharge with DME; contacted Mr. Hull to determine approval from DME closet; Mr. Hull approved; faxed orders and patient information to him at weekend number [phone]. Pulled DME from closet and delivered to patient's room.    Patient refused BSC; explained to her about getting the shower chair on her own to avoid copay, which patient indicated she could not afford.

## 2019-06-09 NOTE — PLAN OF CARE
Problem: Occupational Therapy Goal  Goal: Occupational Therapy Goal  Outcome: Outcome(s) achieved Date Met: 06/09/19  Patient tolerated evaluation well, patient with no need for skilled OT services at this time. BERNICE Mad[place], MS

## 2019-06-09 NOTE — PT/OT/SLP EVAL
Physical Therapy Evaluation    Patient Name:  Charu Oswald   MRN:  [id number]    Recommendations:     Discharge Recommendations:  home health PT   Discharge Equipment Recommendations: walker, rolling, shower chair, commode   Barriers to discharge: None    Assessment:     Charu Oswald is a 63 y.o. female admitted with a medical diagnosis of Chronic obstructive pulmonary disease with acute exacerbation.  She presents with the following impairments/functional limitations:  weakness, impaired endurance, impaired functional mobilty, gait instability, impaired balance, decreased coordination, decreased safety awareness, decreased lower extremity function, impaired cardiopulmonary response to activity.    Rehab Prognosis: Good; patient would benefit from acute skilled PT services to address these deficits and reach maximum level of function.    Recent Surgery: * No surgery found *      Plan:     During this hospitalization, patient to be seen 5 x/week to address the identified rehab impairments via gait training, therapeutic activities, therapeutic exercises and progress toward the following goals:    · Plan of Care Expires:  06/22/19    Subjective     Chief Complaint: SOB with weight bearing activities requiring standing rest break  Patient/Family Comments/goals: to return to PLOF    Pain/Comfort:  · Pain Rating 1: 0/10    Patients cultural, spiritual, Jainism conflicts given the current situation:      Living Environment:  Pt lives with boyfriend in a [facility] with 1 MEHUL.   Prior to admission, patients level of function was Independent.  Equipment used at home: none.  DME owned (not currently used): none.  Upon discharge, patient will have assistance from boyfriend and self.    Objective:     Communicated with MD prior to session.  Patient found sitting on the EOB  with peripheral IV, telemetry, oxygen(- 2.0 Lpm of O2 via N.[place].)  upon PT entry to room.    General Precautions: Standard, fall, anti-coagulation medicine    Orthopedic Precautions:Full weight bearing   Braces: N/A     Exams:  · Cognitive Exam:  Patient is oriented to Person, Place and Situation  · Gross Motor Coordination:  WFL  · Postural Exam:  Patient presented with the following abnormalities:    · -       Rounded shoulders  · -       Forward head  · -       Abnormal trunk flexion  · Skin Integrity/Edema:      · -       Skin integrity: Visible skin intact  · -       Edema: None noted .  · RLE ROM: WFL  · RLE Strength: WFL  · LLE ROM: WFL  · LLE Strength: WFL    Functional Mobility:  · Bed Mobility:     · Supine to Sit: contact guard assistance  · Sit to Supine: contact guard assistance  · Transfers:     · Sit to Stand:  minimum assistance with rolling walker  · Gait: 120' and 80' with RW and CGA/Manjinder with a standing rest break >1 min between trials.      AM-PAC 6 CLICK MOBILITY  Total Score:20     Patient left sitting on EOB with all lines intact, call button in reach and boyfriend present.    GOALS:   Multidisciplinary Problems     Physical Therapy Goals        Problem: Physical Therapy Goal    Goal Priority Disciplines Outcome Goal Variances Interventions   Physical Therapy Goal     PT, PT/OT Ongoing (interventions implemented as appropriate)     Description:  Goals to be met by: 2019     Patient will increase functional independence with mobility by performin. Supine to sit with Modified [place]  2. Sit to supine with Modified [place]  3. Sit to stand transfer with Modified [place]  4. Gait  x 350 feet with Modified [place] using Rolling Walker.    Recommend: HHPT, Rolling Walker, Shower Chair, and Bed Side Commode (as per pt's request) at time of discharge.                          History:     Past Medical History:   Diagnosis Date    Asthma     Congestive heart failure     Diabetes mellitus     Type II    Hyperlipidemia     Pacemaker     Pedal edema        Past Surgical History:   Procedure Laterality Date    Cardiac  bypass  1994    CARDIAC PACEMAKER PLACEMENT  2016       Time Tracking:     PT Received On: 06/09/19  PT Start Time: 0845     PT Stop Time: 0915  PT Total Time (min): 30 min     Billable Minutes: Evaluation 30 min      Anthony Franklin, PT  06/09/2019

## 2019-06-09 NOTE — PT/OT/SLP EVAL
"Occupational Therapy   Evaluation and Discharge Note    Name: Charu Oswald  MRN: [id number]  Admitting Diagnosis:  Chronic obstructive pulmonary disease with acute exacerbation      Recommendations:     Discharge Recommendations: home  Discharge Equipment Recommendations:  walker, rolling, shower chair, commode  Barriers to discharge:  None    Assessment:     Charu Oswald is a 63 y.o. female with a medical diagnosis of Chronic obstructive pulmonary disease with acute exacerbation. At this time, patient is functioning at their prior level of function and does not require further acute OT services.     Plan:     During this hospitalization, patient does not require further acute OT services.  Please re-consult if situation changes.    · Plan of Care Reviewed with: patient, friend    Subjective     Chief Complaint: "I suffer with with my back and my legs."  Patient/Family Comments/goals: to return home    Occupational Profile:  Living Environment: lives alone in a SS house with a 2 MEHUL in the front and the back with HR one one side  Previous level of function: independent  Roles and Routines: ambulated with her RW only when neccessary  Equipment Used at home:  nebulizer  Assistance upon Discharge: from family or friends has a gentleman friend in the room)    Pain/Comfort:  · Pain Rating 1: 0/10    Patients cultural, spiritual, Hinduism conflicts given the current situation: no    Objective:     Communicated with: nurse Murguia prior to session.  Patient found seated EOB on the phone with   upon OT entry to room.    General Precautions: Standard, fall(blood thinners)   Orthopedic Precautions:N/A   Braces: N/A     Occupational Performance:    Bed Mobility:    · Patient completed Supine to Sit with independence    Functional Mobility/Transfers:  · Patient completed Sit <> Stand Transfer with independence  with  no assistive device   · Functional Mobility: ambulated to the bathroom without an AD or assistance    Activities " of Daily Living:  · Feeding:  independence    · Grooming: independence    · Upper Body Dressing: independence    · Lower Body Dressing: modified independence    · Toileting: independence      Cognitive/Visual Perceptual:  Cognitive/Psychosocial Skills:     -       Oriented to: Person, Place and Situation   -       Follows Commands/attention:Follows two-step commands  -       Communication: clear/fluent  -       Memory: No Deficits noted  -       Safety awareness/insight to disability: intact   -       Mood/Affect/Coping skills/emotional control: Appropriate to situation    Physical Exam:  Balance:    -       sit balance and standing balance good  Postural examination/scapula alignment:    -       Rounded shoulders  Skin integrity: Visible skin intact  Upper Extremity Range of Motion:     -       Right Upper Extremity: WFL  -       Left Upper Extremity: WFL  Upper Extremity Strength:    -       Right Upper Extremity: WFL  -       Left Upper Extremity: WFL    AMPAC 6 Click ADL:  AMPAC Total Score: 24    Treatment & Education:  Evaluation   Education:    Patient left seated EOB with all lines intact, call button in reach and nurse notified    GOALS:   Multidisciplinary Problems     Occupational Therapy Goals     Not on file          Multidisciplinary Problems (Resolved)        Problem: Occupational Therapy Goal    Goal Priority Disciplines Outcome Interventions   Occupational Therapy Goal   (Resolved)     OT, PT/OT Outcome(s) achieved                    History:     Past Medical History:   Diagnosis Date    Asthma     Congestive heart failure     Diabetes mellitus     Type II    Hyperlipidemia     Pacemaker     Pedal edema        Past Surgical History:   Procedure Laterality Date    Cardiac bypass  1994    CARDIAC PACEMAKER PLACEMENT  2016       Time Tracking:     OT Date of Treatment: 06/09/19  OT Start Time: 1022  OT Stop Time: 1036  OT Total Time (min): 14 min    Billable Minutes:Evaluation 14  minutes    BERNICE Madera, MS  6/9/2019

## 2019-06-09 NOTE — PLAN OF CARE
06/09/19 0934   Final Note   Anticipated Discharge Disposition Home  (DME)   What phone number can be called within the next 1-3 days to see how you are doing after discharge?   ([phone])   Hospital Follow Up  Appt(s) scheduled? Yes   Discharge plans and expectations educations in teach back method with documentation complete? Yes   Right Care Referral Info   Post Acute Recommendation Other   Referral Type DME   Facility Name [facility] [place], State [place], [place]

## 2019-06-09 NOTE — HOSPITAL COURSE
63-year-old female with history of diastolic CHF,COPD,tobacco abuse,HTN,hyperlipidemia,CKD 4,DM, presents to the emergency department complaining of progressively worsening shortness of breath x1 week. Did not improved with home nebulizer,she has non productive cough with no fever, She denies chills, nausea/vomiting. She states she most recently smoked cigarettes night before admission,.chest X ray show vascular congestion,patient was started on IV lasix, and nebulizer,also received IV solumedrol,she had also left common femoral vein nonocclusive thrombus.was on Lovenox,she did not want OAC at DC time,since she is already on plavix,she was consulted follow up with PCP in next few weeks,for repeat US, she was stable on RA at DC time,did well with PT,OT and DME at DC time is arranged,stronly encouraged avoid smoking.

## 2019-06-09 NOTE — PLAN OF CARE
06/09/19 0936   Post-Acute Status   Post-Acute Authorization HME  (Home with BSC and RW)   HME Status Set-up Complete   Discharge Delays None known at this time

## 2019-06-09 NOTE — NURSING
Received report from TRUDI Root. Patient lying in bed, Alert and oriented x 4. NAD noted, safety precautions maintained, family at bedside. Will monitor.

## 2019-06-09 NOTE — PLAN OF CARE
Problem: Diabetes Comorbidity  Goal: Blood Glucose Level Within Desired Range    Intervention: Maintain Glycemic Control     06/08/19 1940   Monitor and Manage Ketoacidosis   Glycemic Management blood glucose monitoring;supplemental insulin given         Problem: Fall Injury Risk  Goal: Absence of Fall and Fall-Related Injury    Intervention: Identify and Manage Contributors to Fall Injury Risk     06/08/19 1940   Identify and Manage Contributors to Fall Injury Risk   Self-Care Promotion independence encouraged   Manage Acute Allergic Reaction   Medication Review/Management medications reviewed;pharmacy consulted     Intervention: Promote Injury-Free Environment     06/08/19 1940 06/09/19 0420   Optimize Balance and Safe Activity   Safety Promotion/Fall Prevention  --  assistive device/personal item within reach;bed alarm set;bedside commode chair;crib side rails raised x2;diversional activities provided;high risk medications identified;lighting adjusted;medications reviewed;nonskid shoes/socks when out of bed;room near unit station;side rails raised x 3;instructed to call staff for mobility   Optimize [place] and Functional Mobility   Environmental Safety Modification assistive device/personal items within reach;clutter free environment maintained;lighting adjusted;room near unit station  --

## 2019-06-09 NOTE — PLAN OF CARE
Problem: Physical Therapy Goal  Goal: Physical Therapy Goal  Goals to be met by: 2019     Patient will increase functional independence with mobility by performin. Supine to sit with Modified [place]  2. Sit to supine with Modified [place]  3. Sit to stand transfer with Modified [place]  4. Gait  x 350 feet with Modified [place] using Rolling Walker.    Recommend: HHPT, Rolling Walker, Shower Chair, and Bed Side Commode (as per pt's request) at time of discharge.        Outcome: Ongoing (interventions implemented as appropriate)  Anthony Franklin, PT  2019

## 2019-06-09 NOTE — DISCHARGE SUMMARY
[facility] Hospital Medicine  Discharge Summary      Patient Name: Charu Oswald  MRN: [id number]  Admission Date: 6/8/2019  Hospital Length of Stay: 1 days  Discharge Date and Time:  06/09/2019 9:11 AM  Attending Physician: Fadi Rivera MD   Discharging Provider: Fadi Rivera MD  Primary Care Provider: Gerda Carter MD      HPI:   63-year-old female with history of CHF,COPD,tobacco abuse,HTN,hyperlipidemia,CKD 4,DM, presents to the emergency department complaining of progressively worsening shortness of breath x1 week. Did not improved with home nebulizer,she has non productive cough with no fever, She denies chills, nausea/vomiting. She states she most recently smoked cigarettes last night.chest X ray show vascular congestion,patient has been  started on IV lasix, and nebulizer,she has also left femural DVT,started on Lovenox,she is stable currency on NC O 2,    * No surgery found *      Hospital Course:   63-year-old female with history of diastolic CHF,COPD,tobacco abuse,HTN,hyperlipidemia,CKD 4,DM, presents to the emergency department complaining of progressively worsening shortness of breath x1 week. Did not improved with home nebulizer,she has non productive cough with no fever, She denies chills, nausea/vomiting. She states she most recently smoked cigarettes night before admission,.chest X ray show vascular congestion,patient was started on IV lasix, and nebulizer,also received IV solumedrol,she had also left common femoral vein nonocclusive thrombus.was on Lovenox,she did not want OAC at DC time,since she is already on plavix,she was consulted follow up with PCP in next few weeks,for repeat US, she was stable on RA at DC time,did well with PT,OT and DME at DC time is arranged,stronly encouraged avoid smoking.     Consults:   Consults (From admission, onward)        Status Ordering Provider     Inpatient consult to Social Work  Once     Provider:  (Not yet assigned)  "   Acknowledged BANDAR ROME          No new Assessment & Plan notes have been filed under this hospital service since the last note was generated.  Service: Hospital Medicine    Final Active Diagnoses:    Diagnosis Date Noted POA    PRINCIPAL PROBLEM:  Chronic obstructive pulmonary disease with acute exacerbation [J44.1] 06/08/2019 Yes    Acute on chronic combined systolic and diastolic CHF (congestive heart failure) [I50.43] 06/08/2019 Yes    Bronchitis, chronic obstructive w acute bronchitis [J44.0] 06/09/2019 Yes    Ischemic cardiomyopathy [I25.5] 06/08/2019 Yes    Hypercholesteremia [E78.00] 06/08/2019 Yes    AICD (automatic cardioverter/defibrillator) present [Z95.810] 06/08/2019 Yes    DM (diabetes mellitus), secondary, uncontrolled, w/renal complications [E13.29, E13.65] 06/08/2019 Yes    Tobacco abuse [Z72.0] 06/08/2019 Yes    Obesity (BMI 30.0-34.9) [E66.9] 06/08/2019 Yes    Deep vein thrombosis (DVT) of femoral vein of left lower extremity [I82.412] 06/08/2019 Yes    CKD (chronic kidney disease), stage IV [N18.4] 05/01/2019 Yes      Problems Resolved During this Admission:       Discharged Condition: stable    Disposition: Home or Self Care    Follow Up:  Follow-up Information     Gerda Carter MD In 1 week.    Specialty:  Family Medicine  Contact information:  [place] [id number] [phone]                 Patient Instructions:      WALKER FOR HOME USE     Order Specific Question Answer Comments   Type of Walker: Adult (5'4"-6'6")    With wheels? Yes    Height: 5' 2" (1.575 m)    Weight: 92.8 kg (204 lb 9.4 oz)    Length of need (1-99 months): 99    Does patient have medical equipment at home? none    Please check all that apply: Patient's condition impairs ambulation.    Please check all that apply: Patient is unable to safely ambulate without equipment.      COMMODE FOR HOME USE     Order Specific Question Answer Comments   Type: Standard    Height: 5' 2" " (1.575 m)    Weight: 92.8 kg (204 lb 9.4 oz)    Does patient have medical equipment at home? none    Length of need (1-99 months): 99      Activity as tolerated       Significant Diagnostic Studies: Labs:   BMP:   Recent Labs   Lab 06/09/19  0544   *   *   K 4.8   CL 96   CO2 28   BUN 36*   CREATININE 3.1*   CALCIUM 9.6    and CBC   Recent Labs   Lab 06/09/19  0544   WBC 8.33   HGB 11.5*   HCT 36.9*        Radiology: X-Ray: CXR: X-Ray Chest 1 View (CXR): No results found for this visit on 06/08/19. and X-Ray Chest PA and Lateral (CXR): No results found for this visit on 06/08/19.  Cardiac Graphics: Echocardiogram: 2D echo with color flow doppler: No results found for this or any previous visit. and Transthoracic echo (TTE) complete (Cupid Only): No results found for this or any previous visit.    Pending Diagnostic Studies:     Procedure Component Value Units Date/Time    EKG 12-lead [[id number]]     Order Status:  Sent Lab Status:  No result     Hemoglobin A1c [[id number]] Collected:  06/08/19 1313    Order Status:  Sent Lab Status:  In process Updated:  06/08/19 1313    Specimen:  Blood     Transthoracic echo (TTE) 2D with Color Flow [[id number]]     Order Status:  Sent Lab Status:  No result          Medications:  Reconciled Home Medications:      Medication List      CHANGE how you take these medications    furosemide 40 MG tablet  Commonly known as:  LASIX  Take 1 tablet (40 mg total) by mouth once daily.  What changed:  See the new instructions.        CONTINUE taking these medications    albuterol-ipratropium 2.5 mg-0.5 mg/3 mL nebulizer solution  Commonly known as:  DUO-NEB  Take 3 mLs by nebulization every 6 (six) hours while awake. Rescue     amiodarone 200 MG Tab  Commonly known as:  PACERONE  TAKE 1 TABLET BY MOUTH IN THE MORNING AND A HALF TABLET BY MOUTH IN THE EVENING     carvedilol 6.25 MG tablet  Commonly known as:  COREG  TK 1 T PO  BID     clopidogrel 75 mg tablet  Commonly known  as:  PLAVIX  Take 75 mg by mouth once daily.     dicyclomine 20 mg tablet  Commonly known as:  BENTYL  Take 1 tablet (20 mg total) by mouth 2 (two) times daily. for 15 days     fluticasone propionate 50 mcg/actuation nasal spray  Commonly known as:  FLONASE  2 sprays (100 mcg total) by Each Nare route once daily.     levothyroxine 50 MCG tablet  Commonly known as:  SYNTHROID  Take 50 mcg by mouth once daily.     loratadine 10 mg tablet  Commonly known as:  CLARITIN  Take 1 tablet (10 mg total) by mouth once daily.     montelukast 10 mg tablet  Commonly known as:  SINGULAIR  Take 1 tablet (10 mg total) by mouth every evening.     potassium chloride SA 10 MEQ tablet  Commonly known as:  K-DUR,KLOR-CON  Take 10 mEq by mouth every other day.     pravastatin 10 MG tablet  Commonly known as:  PRAVACHOL  TK 1 T PO QHS            Indwelling Lines/Drains at time of discharge:   Lines/Drains/Airways          None          Time spent on the discharge of patient: less than 30  minutes  Patient was seen and examined on the date of discharge and determined to be suitable for discharge.         Fadi Rivera MD  Department of Hospital Medicine  [facility]

## 2019-06-09 NOTE — PLAN OF CARE
06/09/19 1333   Discharge Assessment   Assessment Type Discharge Planning Assessment   Confirmed/corrected address and phone number on facesheet? Yes   Assessment information obtained from? Patient   Communicated expected length of stay with patient/caregiver yes   Prior to hospitilization cognitive status: Alert/Oriented   Prior to hospitalization functional status: Independent;Assistive Equipment;Needs Assistance  (uses walker; brother assists some)   Current cognitive status: Alert/Oriented   Current Functional Status: Independent;Assistive Equipment;Needs Assistance  (Walker; brother assists)   Facility Arrived From: Home   Lives With alone   Able to Return to Prior Arrangements yes   Is patient able to care for self after discharge? Yes   Who are your caregiver(s) and their phone number(s)? Brother; friends: [phone]   Patient's perception of discharge disposition home or selfcare   Readmission Within the Last 30 Days no previous admission in last 30 days   Patient currently being followed by outpatient case management? No   Patient currently receives any other outside agency services? No   Equipment Currently Used at Home walker, rolling   Do you have any problems affording any of your prescribed medications? No   Is the patient taking medications as prescribed? yes   Does the patient have transportation home? Yes   Transportation Anticipated family or friend will provide   Does the patient receive services at the Coumadin Clinic? No   Discharge Plan A Home  (With walker)   Discharge Plan B Other  (TBD)   DME Needed Upon Discharge  other (see comments)  (TBD)   Patient/Family in Agreement with Plan yes   SW Role explained to patient; two patient identifiers recognized; SW contact information placed on Communication board. Discussed patient managing health care at home; determined who would be helping patient at home with recovery: brother; family will help with recovery    PCP: Gerda Carter MD Prefers  morning appointments    Extended Emergency Contact Information  Primary Emergency Contact: Dhruv Kearney   [place] [facility]  Mobile Phone: [phone]  Relation: Significant other     [facility] [address] - [place] - [address] [facility]  [address]  [place] [address]  Phone: [phone] Fax: [phone]    [facility] [address] [place] [address] [place] [address]  [place] [address]  Phone: [phone] Fax: [phone]    Payor: MEDICAID / Plan: [place] / Product Type: Managed Medicaid /

## 2019-06-10 LAB — POCT GLUCOSE: 400 MG/DL (ref 70–110)

## 2019-06-12 ENCOUNTER — HOSPITAL ENCOUNTER (EMERGENCY)
Facility: HOSPITAL | Age: 64
Discharge: HOME | End: 2019-06-12
Attending: EMERGENCY MEDICINE
Payer: MEDICAID

## 2019-06-12 VITALS
HEART RATE: 69 BPM | TEMPERATURE: 98 F | RESPIRATION RATE: 22 BRPM | HEIGHT: 62 IN | WEIGHT: 190 LBS | OXYGEN SATURATION: 95 % | SYSTOLIC BLOOD PRESSURE: 147 MMHG | DIASTOLIC BLOOD PRESSURE: 88 MMHG | BODY MASS INDEX: 34.96 KG/M2

## 2019-06-12 DIAGNOSIS — R06.02 SOB (SHORTNESS OF BREATH): ICD-10-CM

## 2019-06-12 DIAGNOSIS — J44.1 COPD EXACERBATION: Primary | ICD-10-CM

## 2019-06-12 DIAGNOSIS — R06.02 SHORTNESS OF BREATH: ICD-10-CM

## 2019-06-12 DIAGNOSIS — I50.9 CONGESTIVE HEART FAILURE, UNSPECIFIED HF CHRONICITY, UNSPECIFIED HEART FAILURE TYPE: ICD-10-CM

## 2019-06-12 LAB
ALBUMIN SERPL-MCNC: 3.5 G/DL
ALP SERPL-CCNC: 99 U/L (ref 42–141)
BILIRUB SERPL-MCNC: 0.4 MG/DL (ref 0.2–1.6)
BUN SERPL-MCNC: 36 MG/DL (ref 7–22)
CALCIUM SERPL-MCNC: 9.8 MG/DL (ref 8–10.3)
CHLORIDE SERPL-SCNC: 97 MMOL/L (ref 98–108)
CREAT SERPL-MCNC: 2.8 MG/DL (ref 0.6–1.2)
GLUCOSE SERPL-MCNC: 217 MG/DL (ref 73–118)
POC ALT (SGPT): 22 U/L (ref 10–47)
POC AST (SGOT): 25 U/L (ref 11–38)
POC B-TYPE NATRIURETIC PEPTIDE: 1580 PG/ML (ref 0–100)
POC CARDIAC TROPONIN I: 0.02 NG/ML
POC PTINR: 1.1 (ref 0.9–1.2)
POC PTWBT: 12.8 SEC (ref 9.7–14.3)
POC TCO2: 30 MMOL/L (ref 18–33)
POCT GLUCOSE: 241 MG/DL (ref 70–110)
POTASSIUM BLD-SCNC: 4.1 MMOL/L (ref 3.6–5.1)
PROTEIN, POC: 6.6 G/DL (ref 6.4–8.1)
SAMPLE: NORMAL
SAMPLE: NORMAL
SODIUM BLD-SCNC: 139 MMOL/L (ref 128–145)

## 2019-06-12 PROCEDURE — 84484 ASSAY OF TROPONIN QUANT: CPT | Mod: ER

## 2019-06-12 PROCEDURE — 99291 CRITICAL CARE FIRST HOUR: CPT | Mod: 25,ER

## 2019-06-12 PROCEDURE — 93010 EKG 12-LEAD: ICD-10-PCS | Mod: ,,, | Performed by: INTERNAL MEDICINE

## 2019-06-12 PROCEDURE — 93005 ELECTROCARDIOGRAM TRACING: CPT | Mod: ER

## 2019-06-12 PROCEDURE — 85610 PROTHROMBIN TIME: CPT | Mod: ER

## 2019-06-12 PROCEDURE — 93010 ELECTROCARDIOGRAM REPORT: CPT | Mod: ,,, | Performed by: INTERNAL MEDICINE

## 2019-06-12 PROCEDURE — 96374 THER/PROPH/DIAG INJ IV PUSH: CPT | Mod: ER

## 2019-06-12 PROCEDURE — 96375 TX/PRO/DX INJ NEW DRUG ADDON: CPT | Mod: ER

## 2019-06-12 PROCEDURE — 25000003 PHARM REV CODE 250: Mod: ER | Performed by: EMERGENCY MEDICINE

## 2019-06-12 PROCEDURE — 63600175 PHARM REV CODE 636 W HCPCS: Mod: ER | Performed by: EMERGENCY MEDICINE

## 2019-06-12 PROCEDURE — 80053 COMPREHEN METABOLIC PANEL: CPT | Mod: ER

## 2019-06-12 PROCEDURE — 25000242 PHARM REV CODE 250 ALT 637 W/ HCPCS: Mod: ER | Performed by: EMERGENCY MEDICINE

## 2019-06-12 PROCEDURE — 85025 COMPLETE CBC W/AUTO DIFF WBC: CPT | Mod: ER

## 2019-06-12 RX ORDER — METHYLPREDNISOLONE SOD SUCC 125 MG
125 VIAL (EA) INJECTION
Status: COMPLETED | OUTPATIENT
Start: 2019-06-12 | End: 2019-06-12

## 2019-06-12 RX ORDER — ASPIRIN 325 MG
325 TABLET ORAL
Status: COMPLETED | OUTPATIENT
Start: 2019-06-12 | End: 2019-06-12

## 2019-06-12 RX ORDER — IPRATROPIUM BROMIDE 0.5 MG/2.5ML
0.5 SOLUTION RESPIRATORY (INHALATION) ONCE
Status: COMPLETED | OUTPATIENT
Start: 2019-06-12 | End: 2019-06-12

## 2019-06-12 RX ORDER — IPRATROPIUM BROMIDE AND ALBUTEROL SULFATE 2.5; .5 MG/3ML; MG/3ML
3 SOLUTION RESPIRATORY (INHALATION) EVERY 6 HOURS PRN
Qty: 1 BOX | Refills: 0 | Status: SHIPPED | OUTPATIENT
Start: 2019-06-12 | End: 2020-06-11

## 2019-06-12 RX ORDER — ALBUTEROL SULFATE 2.5 MG/.5ML
10 SOLUTION RESPIRATORY (INHALATION) ONCE
Status: COMPLETED | OUTPATIENT
Start: 2019-06-12 | End: 2019-06-12

## 2019-06-12 RX ORDER — FUROSEMIDE 10 MG/ML
40 INJECTION INTRAMUSCULAR; INTRAVENOUS
Status: COMPLETED | OUTPATIENT
Start: 2019-06-12 | End: 2019-06-12

## 2019-06-12 RX ORDER — IPRATROPIUM BROMIDE AND ALBUTEROL SULFATE 2.5; .5 MG/3ML; MG/3ML
3 SOLUTION RESPIRATORY (INHALATION) ONCE
Status: COMPLETED | OUTPATIENT
Start: 2019-06-12 | End: 2019-06-12

## 2019-06-12 RX ADMIN — IPRATROPIUM BROMIDE AND ALBUTEROL SULFATE 3 ML: .5; 3 SOLUTION RESPIRATORY (INHALATION) at 06:06

## 2019-06-12 RX ADMIN — ASPIRIN 325 MG ORAL TABLET 325 MG: 325 PILL ORAL at 06:06

## 2019-06-12 RX ADMIN — FUROSEMIDE 40 MG: 10 INJECTION, SOLUTION INTRAMUSCULAR; INTRAVENOUS at 07:06

## 2019-06-12 RX ADMIN — IPRATROPIUM BROMIDE 0.5 MG: 0.5 SOLUTION RESPIRATORY (INHALATION) at 06:06

## 2019-06-12 RX ADMIN — METHYLPREDNISOLONE SODIUM SUCCINATE 125 MG: 125 INJECTION, POWDER, FOR SOLUTION INTRAMUSCULAR; INTRAVENOUS at 07:06

## 2019-06-12 RX ADMIN — ALBUTEROL SULFATE 10 MG: 2.5 SOLUTION RESPIRATORY (INHALATION) at 06:06

## 2019-06-12 RX ADMIN — FUROSEMIDE 40 MG: 10 INJECTION, SOLUTION INTRAMUSCULAR; INTRAVENOUS at 06:06

## 2019-06-12 NOTE — ED PROVIDER NOTES
Encounter Date: 6/12/2019    SCRIBE #1 NOTE: I, Seema Garcia, am scribing for, and in the presence of,  Dr. Lauren. I have scribed the following portions of the note - the EKG reading. Other sections scribed: HPI, ROS, PE.       History     Chief Complaint   Patient presents with    Shortness of Breath     pt reports she was hospitalized last week, discharged 4 days ago and is still SOB. pt reports she also noticed bilateral foot swelling this morning.     Foot Swelling     Charu Oswald is a 63 y.o. female with COPD, CHF, DM, asthma, HLD who presents to the ED complaining of bilateral lower extremity swelling since this morning and increased shortness of breath for 4 days. She was admitted to the hospital on June 8th after visiting the ED for SOB. She was hospitalized and discharged 4 days ago. She states all her symptoms returned after being discharged. She reports continued shortness of breath and abdominal bloating.    She has a pacemaker.    The history is provided by the patient.     Review of patient's allergies indicates:   Allergen Reactions    Nut - unspecified Shortness Of Breath     Past Medical History:   Diagnosis Date    Asthma     Congestive heart failure     Diabetes mellitus     Type II    Hyperlipidemia     Pacemaker     Pedal edema      Past Surgical History:   Procedure Laterality Date    Cardiac bypass  1994    CARDIAC PACEMAKER PLACEMENT  2016     Family History   Problem Relation Age of Onset    Heart disease Father     Heart disease Mother     Cancer Mother         Bone and breast    Breast cancer Mother 50    Diabetes Brother     Diabetes Sister      Social History     Tobacco Use    Smoking status: Former Smoker    Smokeless tobacco: Never Used   Substance Use Topics    Alcohol use: No    Drug use: No     Review of Systems   Constitutional: Negative for fever.   Respiratory: Positive for shortness of breath.    Cardiovascular: Positive for leg swelling. Negative  for chest pain.   Gastrointestinal: Positive for abdominal distention. Negative for abdominal pain and nausea.   Neurological: Negative for dizziness, weakness and light-headedness.   All other systems reviewed and are negative.      Physical Exam     Initial Vitals [06/12/19 1822]   BP Pulse Resp Temp SpO2   128/81 72 17 97.8 °F (36.6 °C) 97 %      MAP       --         Physical Exam    Nursing note and vitals reviewed.  Constitutional: She appears well-developed and well-nourished. She is not diaphoretic.   HENT:   Head: Normocephalic and atraumatic.   Right Ear: External ear normal.   Left Ear: External ear normal.   Nose: Nose normal.   Mouth/Throat: Oropharynx is clear and moist.   Eyes: Conjunctivae and EOM are normal. Pupils are equal, round, and reactive to light.   Neck: Normal range of motion. Neck supple. JVD present.   Cardiovascular: Normal rate and regular rhythm. Exam reveals no gallop and no friction rub.    Murmur heard.  Pulmonary/Chest: She is in respiratory distress (mild). She has wheezes (inspiratory and expiratory). She has rhonchi. She has rales. She exhibits no tenderness.   Abdominal: Soft. Bowel sounds are normal. She exhibits distension. There is no tenderness. There is no rebound and no guarding.   Musculoskeletal: Normal range of motion. She exhibits edema. She exhibits no tenderness.   Neurological: She is alert and oriented to person, place, and time. She has normal strength. No cranial nerve deficit or sensory deficit. GCS score is 15. GCS eye subscore is 4. GCS verbal subscore is 5. GCS motor subscore is 6.   Skin: Skin is warm and dry. Capillary refill takes less than 2 seconds.   Psychiatric: She has a normal mood and affect.         ED Course   Critical Care  Date/Time: 6/12/2019 6:54 PM  Performed by: Urvashi Lauren DO  Authorized by: Urvashi Lauren DO   Direct patient critical care time: 8 minutes  Additional history critical care time: 8 minutes  Ordering / reviewing critical care  time: 8 minutes  Documentation critical care time: 8 minutes  Total critical care time (exclusive of procedural time) : 32 minutes  Critical care was necessary to treat or prevent imminent or life-threatening deterioration of the following conditions: circulatory failure, respiratory failure and cardiac failure.  Critical care was time spent personally by me on the following activities: development of treatment plan with patient or surrogate, interpretation of cardiac output measurements, evaluation of patient's response to treatment, examination of patient, obtaining history from patient or surrogate, ordering and performing treatments and interventions, ordering and review of laboratory studies, ordering and review of radiographic studies, re-evaluation of patient's condition, review of old charts and pulse oximetry.        Labs Reviewed   POCT GLUCOSE - Abnormal; Notable for the following components:       Result Value    POCT Glucose 241 (*)     All other components within normal limits   POCT CMP - Abnormal; Notable for the following components:    POC BUN 36 (*)     POC Creatinine 2.6 (*)     POC Glucose 235 (*)     POC Potassium 5.6 (*)     All other components within normal limits   POCT CMP - Abnormal; Notable for the following components:    POC BUN 36 (*)     POC Chloride 97 (*)     POC Creatinine 2.8 (*)     POC Glucose 217 (*)     All other components within normal limits   TROPONIN ISTAT   POCT CBC   POCT GLUCOSE, HAND-HELD DEVICE   POCT CMP   POCT PROTIME-INR   POCT TROPONIN   ISTAT PROCEDURE   POCT B-TYPE NATRIURETIC PEPTIDE (BNP)     EKG Readings: (Independently Interpreted)   Initial Reading: No STEMI. Previous EKG Date: 6/8/2019. Rhythm: Normal Sinus Rhythm. Heart Rate: 73. Conduction: LBBB. Axis: Left Axis Deviation. Other Impression: Ab    When compared to prior EKG dated 6/8/2019 rate increased by 8 bpm today.       Imaging Results          X-Ray Chest AP Portable (Final result)  Result  time 06/12/19 18:42:00    Final result by Willy Faith MD (06/12/19 18:42:00)                 Impression:      1. Central hilar interstitial attenuation suggests congestive change/early edema noting shallow inspiratory effort.  No large focal consolidation.      Electronically signed by: Willy Faith MD  Date:    06/12/2019  Time:    18:42             Narrative:    EXAMINATION:  XR CHEST AP PORTABLE    CLINICAL HISTORY:  Shortness of breath;    TECHNIQUE:  Single frontal view of the chest was performed.    COMPARISON:  06/08/2019    FINDINGS:  Left chest wall pacer noted.  The cardiomediastinal silhouette is prominent, similar to the previous exam noting surgical change..  There is no pleural effusion.  The trachea is midline.  The lungs are symmetrically expanded bilaterally with coarse central hilar interstitial attenuation, similar to the previous exam.  No large focal consolidation seen.  There is no pneumothorax.  The osseous structures are remarkable for degenerative change..                               Medical Decision Making:   Clinical Tests:   Lab Tests: Ordered and Reviewed  Radiological Study: Ordered and Reviewed  Medical Tests: Ordered and Reviewed  ED Management:  Echo done on 6/9/2019. EF of 25-30%.  Will order CXR, EKG, CBC, CMP, INR, Troponin, glucose.   Patient treated in ED with breathing treatment, aspirin, furosemide injections.  This patient diuresed approximately 1 L of urine.  Patient states she is feels markedly improved.  This patient's care was turned over me at 7:00 p.m..  Patient is adamant about going home present the she is no longer short of breath and her symptoms have markedly improved.  Patient will be discharged.            Scribe Attestation:   Scribe #1: I performed the above scribed service and the documentation accurately describes the services I performed. I attest to the accuracy of the note.    This document was produced by a scribe under my direction and in my  presence. I agree with the content of the note and have made any necessary edits.     Gato Casper MD    06/12/2019 9:11 PM           Clinical Impression:     1. COPD exacerbation    2. SOB (shortness of breath)    3. Shortness of breath    4. Congestive heart failure, unspecified HF chronicity, unspecified heart failure type                                   Gato Casper MD  06/12/19 2111       Gato Casper MD  06/12/19 2112

## 2019-06-16 ENCOUNTER — HOSPITAL ENCOUNTER (EMERGENCY)
Facility: HOSPITAL | Age: 64
Discharge: HOME | End: 2019-06-16
Attending: INTERNAL MEDICINE
Payer: MEDICAID

## 2019-06-16 VITALS
TEMPERATURE: 99 F | SYSTOLIC BLOOD PRESSURE: 127 MMHG | DIASTOLIC BLOOD PRESSURE: 73 MMHG | OXYGEN SATURATION: 98 % | WEIGHT: 200 LBS | RESPIRATION RATE: 20 BRPM | HEIGHT: 62 IN | HEART RATE: 68 BPM | BODY MASS INDEX: 36.8 KG/M2

## 2019-06-16 DIAGNOSIS — R79.89 ELEVATED SERUM CREATININE: ICD-10-CM

## 2019-06-16 DIAGNOSIS — M79.89 LEFT LEG SWELLING: ICD-10-CM

## 2019-06-16 DIAGNOSIS — I74.3: ICD-10-CM

## 2019-06-16 DIAGNOSIS — R79.89 ELEVATED BRAIN NATRIURETIC PEPTIDE (BNP) LEVEL: ICD-10-CM

## 2019-06-16 DIAGNOSIS — R73.09 ELEVATED GLUCOSE: ICD-10-CM

## 2019-06-16 DIAGNOSIS — R06.2 WHEEZING: ICD-10-CM

## 2019-06-16 DIAGNOSIS — M79.89 SWELLING OF LEFT LOWER EXTREMITY: Primary | ICD-10-CM

## 2019-06-16 LAB
ALBUMIN SERPL-MCNC: 3.3 G/DL
ALP SERPL-CCNC: 90 U/L (ref 42–141)
BILIRUB SERPL-MCNC: 0.4 MG/DL (ref 0.2–1.6)
BUN SERPL-MCNC: 39 MG/DL (ref 7–22)
CALCIUM SERPL-MCNC: 9.4 MG/DL (ref 8–10.3)
CHLORIDE SERPL-SCNC: 97 MMOL/L (ref 98–108)
CREAT SERPL-MCNC: 3 MG/DL (ref 0.6–1.2)
GLUCOSE SERPL-MCNC: 187 MG/DL (ref 73–118)
POC ALT (SGPT): 22 U/L (ref 10–47)
POC AST (SGOT): 27 U/L (ref 11–38)
POC B-TYPE NATRIURETIC PEPTIDE: 1300 PG/ML (ref 0–100)
POC TCO2: 30 MMOL/L (ref 18–33)
POCT GLUCOSE: 203 MG/DL (ref 70–110)
POTASSIUM BLD-SCNC: 3.9 MMOL/L (ref 3.6–5.1)
PROTEIN, POC: 6.4 G/DL (ref 6.4–8.1)
SODIUM BLD-SCNC: 140 MMOL/L (ref 128–145)

## 2019-06-16 PROCEDURE — 99900035 HC TECH TIME PER 15 MIN (STAT): Mod: ER

## 2019-06-16 PROCEDURE — 27000221 HC OXYGEN, UP TO 24 HOURS: Mod: ER

## 2019-06-16 PROCEDURE — 83880 ASSAY OF NATRIURETIC PEPTIDE: CPT | Mod: ER

## 2019-06-16 PROCEDURE — 63600175 PHARM REV CODE 636 W HCPCS: Mod: ER | Performed by: PHYSICIAN ASSISTANT

## 2019-06-16 PROCEDURE — 99284 EMERGENCY DEPT VISIT MOD MDM: CPT | Mod: 25,ER

## 2019-06-16 PROCEDURE — 94640 AIRWAY INHALATION TREATMENT: CPT | Mod: ER

## 2019-06-16 PROCEDURE — 84484 ASSAY OF TROPONIN QUANT: CPT | Mod: ER

## 2019-06-16 PROCEDURE — 80053 COMPREHEN METABOLIC PANEL: CPT | Mod: ER

## 2019-06-16 PROCEDURE — 25000242 PHARM REV CODE 250 ALT 637 W/ HCPCS: Mod: ER | Performed by: PHYSICIAN ASSISTANT

## 2019-06-16 PROCEDURE — 27100107 HC POCKET PEAK FLOW METER: Mod: ER

## 2019-06-16 PROCEDURE — 93005 ELECTROCARDIOGRAM TRACING: CPT | Mod: ER

## 2019-06-16 PROCEDURE — 93010 EKG 12-LEAD: ICD-10-PCS | Mod: ,,, | Performed by: INTERNAL MEDICINE

## 2019-06-16 PROCEDURE — 96374 THER/PROPH/DIAG INJ IV PUSH: CPT | Mod: ER

## 2019-06-16 PROCEDURE — 93010 ELECTROCARDIOGRAM REPORT: CPT | Mod: ,,, | Performed by: INTERNAL MEDICINE

## 2019-06-16 PROCEDURE — 85025 COMPLETE CBC W/AUTO DIFF WBC: CPT | Mod: ER

## 2019-06-16 RX ORDER — IPRATROPIUM BROMIDE AND ALBUTEROL SULFATE 2.5; .5 MG/3ML; MG/3ML
3 SOLUTION RESPIRATORY (INHALATION)
Status: COMPLETED | OUTPATIENT
Start: 2019-06-16 | End: 2019-06-16

## 2019-06-16 RX ORDER — ALBUTEROL SULFATE 90 UG/1
1-2 AEROSOL, METERED RESPIRATORY (INHALATION) EVERY 6 HOURS PRN
Qty: 1 INHALER | Refills: 2 | Status: SHIPPED | OUTPATIENT
Start: 2019-06-16

## 2019-06-16 RX ORDER — FUROSEMIDE 10 MG/ML
20 INJECTION INTRAMUSCULAR; INTRAVENOUS
Status: COMPLETED | OUTPATIENT
Start: 2019-06-16 | End: 2019-06-16

## 2019-06-16 RX ADMIN — IPRATROPIUM BROMIDE AND ALBUTEROL SULFATE 3 ML: .5; 3 SOLUTION RESPIRATORY (INHALATION) at 06:06

## 2019-06-16 RX ADMIN — FUROSEMIDE 20 MG: 10 INJECTION, SOLUTION INTRAVENOUS at 07:06

## 2019-06-16 NOTE — ED PROVIDER NOTES
Encounter Date: 6/16/2019    SCRIBE #1 NOTE: I, Carlyn Reyna, am scribing for, and in the presence of,  ROXI Unger. I have scribed the following portions of the note - Other sections scribed: HPI and ROS.       History     Chief Complaint   Patient presents with    Leg Swelling     Patient with bilateral lower extremity swelling dewspite taking her lasix twice a day     Charu Oswald is a 63 y.o. female who presents to the ED complaining of knee pain and lower extremity swelling in which she came to the ED for on 6/12/2019. Pt was treated with aspirin, furosemide injections and is still having problems with swelling. Pt has hx of CHF, Pedal edema, and DM. Pt is also complaining of wheezing. Pt has asthma and uses an inhaler with no relief.    The history is provided by the patient. No  was used.     Review of patient's allergies indicates:   Allergen Reactions    Nut - unspecified Shortness Of Breath     Past Medical History:   Diagnosis Date    Asthma     Congestive heart failure     Diabetes mellitus     Type II    Hyperlipidemia     Pacemaker     Pedal edema      Past Surgical History:   Procedure Laterality Date    Cardiac bypass  1994    CARDIAC PACEMAKER PLACEMENT  2016     Family History   Problem Relation Age of Onset    Heart disease Father     Heart disease Mother     Cancer Mother         Bone and breast    Breast cancer Mother 50    Diabetes Brother     Diabetes Sister      Social History     Tobacco Use    Smoking status: Former Smoker    Smokeless tobacco: Never Used   Substance Use Topics    Alcohol use: No    Drug use: No     Review of Systems   Constitutional: Negative.  Negative for fever.   HENT: Negative.  Negative for sore throat.    Eyes: Negative.    Respiratory: Positive for wheezing. Negative for shortness of breath.    Cardiovascular: Negative.  Negative for chest pain.   Gastrointestinal: Negative.  Negative for nausea and vomiting.    Endocrine: Negative.    Genitourinary: Negative.  Negative for dysuria.   Musculoskeletal: Positive for arthralgias and joint swelling. Negative for myalgias.   Skin: Negative.  Negative for rash.   Allergic/Immunologic: Negative.    Neurological: Negative.  Negative for headaches.   Hematological: Negative.  Negative for adenopathy.   Psychiatric/Behavioral: Negative.  Negative for behavioral problems.   All other systems reviewed and are negative.      Physical Exam     Initial Vitals [06/16/19 1757]   BP Pulse Resp Temp SpO2   115/68 69 20 98.7 °F (37.1 °C) 96 %      MAP       --         Physical Exam    Nursing note and vitals reviewed.  Constitutional: She appears well-developed and well-nourished. She is not diaphoretic. No distress.   HENT:   Head: Normocephalic and atraumatic.   Nose: Nose normal.   Mouth/Throat: Oropharynx is clear and moist.   Eyes: Conjunctivae and EOM are normal. Right eye exhibits no discharge. Left eye exhibits no discharge.   Neck: Normal range of motion. No tracheal deviation present. No JVD present.   Cardiovascular: Normal rate, regular rhythm and normal heart sounds. Exam reveals no friction rub.    No murmur heard.  Pulmonary/Chest: No stridor. No respiratory distress. She has wheezes (diffuse inspiratory). She has no rhonchi. She has no rales. She exhibits no tenderness.   Musculoskeletal: Normal range of motion.   There is no tenderness of the bilateral lower extremities.  There is unilateral swelling to the left calf when compared to the right.  There is no erythema.  Full ROM of lower extremity joints. Lower extremity distal pulses 2+ equal. Ambulatory.   Neurological: She is alert and oriented to person, place, and time.   Skin: Skin is warm and dry. No rash and no abscess noted. No erythema. No pallor.         ED Course   Procedures  Labs Reviewed   POCT GLUCOSE - Abnormal; Notable for the following components:       Result Value    POCT Glucose 203 (*)     All other  components within normal limits   POCT CMP - Abnormal; Notable for the following components:    POC BUN 39 (*)     POC Chloride 97 (*)     POC Creatinine 3.0 (*)     POC Glucose 187 (*)     All other components within normal limits   POCT B-TYPE NATRIURETIC PEPTIDE (BNP) - Abnormal; Notable for the following components:    POC B-Type Natriuretic Peptide 1300 (*)     All other components within normal limits   POCT CBC   POCT CMP   POCT B-TYPE NATRIURETIC PEPTIDE (BNP)   POCT TROPONIN          Imaging Results           US Lower Extremity Veins Bilateral (Final result)  Result time 06/16/19 19:30:50    Final result by Abdiel Martinez MD (06/16/19 19:30:50)                 Impression:      Unchanged findings of partial compressibility of the left common femoral vein, suggestive of a nonocclusive thrombus within the left common femoral vein.    This report was flagged in Epic as abnormal.      Electronically signed by: Abdiel Martinez MD  Date:    06/16/2019  Time:    19:30             Narrative:    EXAMINATION:  US LOWER EXTREMITY VEINS BILATERAL    CLINICAL HISTORY:  Other specified soft tissue disorders    TECHNIQUE:  Duplex and color flow Doppler and dynamic compression was performed of the bilateral lower extremity veins was performed.    COMPARISON:  06/08/2019.    FINDINGS:  Right thigh veins: The common femoral, femoral, popliteal, upper greater saphenous, and deep femoral veins are patent and free of thrombus. The veins are normally compressible and have normal phasic flow and augmentation response.    Right calf veins: The visualized calf veins are patent.    Left thigh veins: The femoral, popliteal, upper greater saphenous, and deep femoral veins are patent and free of thrombus.  There is partial compressibility of the left common femoral vein.  The remaining veins are normally compressible and have normal phasic flow.    Left calf veins: The visualized calf veins are patent.    Miscellaneous: None                                X-Ray Chest AP Portable (Final result)  Result time 06/16/19 18:25:19    Final result by Willy Faith MD (06/16/19 18:25:19)                 Impression:      1. Hilar findings may reflect congestive change, no large focal consolidation.  In comparison to the previous exam, this has improved.      Electronically signed by: Willy Faith MD  Date:    06/16/2019  Time:    18:25             Narrative:    EXAMINATION:  XR CHEST AP PORTABLE    CLINICAL HISTORY:  Other specified soft tissue disorders    TECHNIQUE:  Single frontal view of the chest was performed.    COMPARISON:  06/12/2019    FINDINGS:  Left chest wall pacer noted.  The cardiomediastinal silhouette is prominent, similar to the previous exam.  There is no pleural effusion.  The trachea is midline.  The lungs are symmetrically expanded bilaterally with mildly prominent central hilar interstitial attenuation.  No large focal consolidation seen.  There is no pneumothorax.  The osseous structures are remarkable for degenerative change..                                 Medical Decision Making:   History:   Old Medical Records: I decided to obtain old medical records.  Initial Assessment:   63-year-old female with multiple complaints  Independently Interpreted Test(s):   I have ordered and independently interpreted X-rays - see prior notes.  I have ordered and independently interpreted EKG Reading(s) - see prior notes  Clinical Tests:   Lab Tests: Ordered and Reviewed  Radiological Study: Ordered and Reviewed  Medical Tests: Ordered and Reviewed  ED Management:  Will obtain screening labs and imaging while treating symptoms and monitoring patient.    Patient reports improvement of symptoms with DuoNeb in the ED.  Small amount of Lasix added at Dr. Montoya's recommendation.     Nonobstructing thrombus to the right femoral vein noted to be unchanged from prior studies.  Patient is on Plavix only because she states she is unable to take Xarelto or  other anticoagulants.  Swelling may be related to a combination of both CHF and CKD. No ischemic limb.  No cellulitis or septic joint.  No history of trauma. Creatinine elevated, but not significantly different from patient's baseline.  BMP also elevated, but improved from her baseline.  No large pleural effusions on chest x-ray today.  No pneumonia or pneumothorax.  No leukocytosis.  EKG and troponin normal; ACS unlikely.    Sent home with supportive care.  Patient has appointment with her PCP in 2 days.  Strict return precautions discussed with patient.  Patient agreeable to plan.  Other:   I have discussed this case with another health care provider.       <> Summary of the Discussion: Discussed with attending            Scribe Attestation:   Scribe #1: I performed the above scribed service and the documentation accurately describes the services I performed. I attest to the accuracy of the note.               Clinical Impression:     1. Swelling of left lower extremity    2. Left leg swelling    3. Femoral thrombosis    4. Elevated serum creatinine    5. Elevated brain natriuretic peptide (BNP) level    6. Wheezing    7. Elevated glucose                                   Mario Alberto Culver PA-C  06/16/19 0329

## 2019-08-09 ENCOUNTER — HOSPITAL ENCOUNTER (INPATIENT)
Facility: HOSPITAL | Age: 64
LOS: 2 days | Discharge: HOME | DRG: 291 | End: 2019-08-11
Attending: EMERGENCY MEDICINE | Admitting: EMERGENCY MEDICINE
Payer: MEDICAID

## 2019-08-09 DIAGNOSIS — I50.9 CONGESTIVE HEART FAILURE, UNSPECIFIED HF CHRONICITY, UNSPECIFIED HEART FAILURE TYPE: Primary | ICD-10-CM

## 2019-08-09 DIAGNOSIS — I50.43 ACUTE ON CHRONIC COMBINED SYSTOLIC AND DIASTOLIC CONGESTIVE HEART FAILURE: ICD-10-CM

## 2019-08-09 DIAGNOSIS — S37.009A KIDNEY INJURY: ICD-10-CM

## 2019-08-09 DIAGNOSIS — R53.1 WEAKNESS: ICD-10-CM

## 2019-08-09 DIAGNOSIS — E16.2 HYPOGLYCEMIA: ICD-10-CM

## 2019-08-09 DIAGNOSIS — R07.9 CHEST PAIN: ICD-10-CM

## 2019-08-09 PROBLEM — E03.9 HYPOTHYROIDISM: Status: ACTIVE | Noted: 2019-08-09

## 2019-08-09 PROBLEM — N17.9 ACUTE RENAL FAILURE SUPERIMPOSED ON STAGE 4 CHRONIC KIDNEY DISEASE: Status: ACTIVE | Noted: 2019-05-01

## 2019-08-09 LAB
ALBUMIN SERPL BCP-MCNC: 3.7 G/DL (ref 3.5–5.2)
ALLENS TEST: ABNORMAL
ALP SERPL-CCNC: 233 U/L (ref 55–135)
ALT SERPL W/O P-5'-P-CCNC: 12 U/L (ref 10–44)
ANION GAP SERPL CALC-SCNC: 13 MMOL/L (ref 8–16)
AST SERPL-CCNC: 22 U/L (ref 10–40)
BASOPHILS # BLD AUTO: 0.02 K/UL (ref 0–0.2)
BASOPHILS NFR BLD: 0.5 % (ref 0–1.9)
BILIRUB SERPL-MCNC: 0.6 MG/DL (ref 0.1–1)
BILIRUB UR QL STRIP: NEGATIVE
BNP SERPL-MCNC: 3096 PG/ML (ref 0–99)
BUN SERPL-MCNC: 45 MG/DL (ref 8–23)
CALCIUM SERPL-MCNC: 9.3 MG/DL (ref 8.7–10.5)
CHLORIDE SERPL-SCNC: 88 MMOL/L (ref 95–110)
CK SERPL-CCNC: 81 U/L (ref 20–180)
CK SERPL-CCNC: 85 U/L (ref 20–180)
CLARITY UR: CLEAR
CO2 SERPL-SCNC: 32 MMOL/L (ref 23–29)
COLOR UR: YELLOW
CREAT SERPL-MCNC: 4.2 MG/DL (ref 0.5–1.4)
CREAT UR-MCNC: 97.7 MG/DL (ref 15–325)
DELSYS: ABNORMAL
DIFFERENTIAL METHOD: ABNORMAL
EOSINOPHIL # BLD AUTO: 0.1 K/UL (ref 0–0.5)
EOSINOPHIL NFR BLD: 2.1 % (ref 0–8)
ERYTHROCYTE [DISTWIDTH] IN BLOOD BY AUTOMATED COUNT: 18.7 % (ref 11.5–14.5)
EST. GFR  (AFRICAN AMERICAN): 12 ML/MIN/1.73 M^2
EST. GFR  (NON AFRICAN AMERICAN): 11 ML/MIN/1.73 M^2
GLUCOSE SERPL-MCNC: 51 MG/DL (ref 70–110)
GLUCOSE UR QL STRIP: NEGATIVE
HCO3 UR-SCNC: 30.5 MMOL/L (ref 24–28)
HCT VFR BLD AUTO: 34.9 % (ref 37–48.5)
HGB BLD-MCNC: 11.2 G/DL (ref 12–16)
HGB UR QL STRIP: NEGATIVE
KETONES UR QL STRIP: NEGATIVE
LEUKOCYTE ESTERASE UR QL STRIP: ABNORMAL
LYMPHOCYTES # BLD AUTO: 1.8 K/UL (ref 1–4.8)
LYMPHOCYTES NFR BLD: 48.5 % (ref 18–48)
MAGNESIUM SERPL-MCNC: 2.8 MG/DL (ref 1.6–2.6)
MCH RBC QN AUTO: 20.2 PG (ref 27–31)
MCHC RBC AUTO-ENTMCNC: 32.1 G/DL (ref 32–36)
MCV RBC AUTO: 63 FL (ref 82–98)
MICROSCOPIC COMMENT: NORMAL
MODE: ABNORMAL
MONOCYTES # BLD AUTO: 0.5 K/UL (ref 0.3–1)
MONOCYTES NFR BLD: 12.7 % (ref 4–15)
NEUTROPHILS # BLD AUTO: 1.4 K/UL (ref 1.8–7.7)
NEUTROPHILS NFR BLD: 36.7 % (ref 38–73)
NITRITE UR QL STRIP: NEGATIVE
PCO2 BLDA: 43.5 MMHG (ref 35–45)
PH SMN: 7.46 [PH] (ref 7.35–7.45)
PH UR STRIP: 5 [PH] (ref 5–8)
PHOSPHATE SERPL-MCNC: 3.6 MG/DL (ref 2.7–4.5)
PLATELET # BLD AUTO: 253 K/UL (ref 150–350)
PMV BLD AUTO: ABNORMAL FL (ref 9.2–12.9)
PO2 BLDA: 61 MMHG (ref 80–100)
POC BE: 6 MMOL/L
POC SATURATED O2: 92 % (ref 95–100)
POC TCO2: 32 MMOL/L (ref 23–27)
POCT GLUCOSE: 158 MG/DL (ref 70–110)
POTASSIUM SERPL-SCNC: 3.7 MMOL/L (ref 3.5–5.1)
PROT SERPL-MCNC: 7 G/DL (ref 6–8.4)
PROT UR QL STRIP: NEGATIVE
RBC # BLD AUTO: 5.55 M/UL (ref 4–5.4)
SAMPLE: ABNORMAL
SITE: ABNORMAL
SODIUM SERPL-SCNC: 133 MMOL/L (ref 136–145)
SODIUM UR-SCNC: <20 MMOL/L (ref 20–250)
SP GR UR STRIP: 1.01 (ref 1–1.03)
TROPONIN I SERPL DL<=0.01 NG/ML-MCNC: 0.02 NG/ML (ref 0–0.03)
TSH SERPL DL<=0.005 MIU/L-ACNC: 1.97 UIU/ML (ref 0.4–4)
URN SPEC COLLECT METH UR: ABNORMAL
UROBILINOGEN UR STRIP-ACNC: NEGATIVE EU/DL
WBC # BLD AUTO: 3.77 K/UL (ref 3.9–12.7)
WBC #/AREA URNS HPF: 0 /HPF (ref 0–5)

## 2019-08-09 PROCEDURE — 25000242 PHARM REV CODE 250 ALT 637 W/ HCPCS: Performed by: EMERGENCY MEDICINE

## 2019-08-09 PROCEDURE — 93005 ELECTROCARDIOGRAM TRACING: CPT

## 2019-08-09 PROCEDURE — 84484 ASSAY OF TROPONIN QUANT: CPT

## 2019-08-09 PROCEDURE — 82570 ASSAY OF URINE CREATININE: CPT

## 2019-08-09 PROCEDURE — 84443 ASSAY THYROID STIM HORMONE: CPT

## 2019-08-09 PROCEDURE — 25000003 PHARM REV CODE 250: Performed by: EMERGENCY MEDICINE

## 2019-08-09 PROCEDURE — 25000242 PHARM REV CODE 250 ALT 637 W/ HCPCS: Performed by: INTERNAL MEDICINE

## 2019-08-09 PROCEDURE — S0171 BUMETANIDE 0.5 MG: HCPCS | Performed by: INTERNAL MEDICINE

## 2019-08-09 PROCEDURE — 83880 ASSAY OF NATRIURETIC PEPTIDE: CPT

## 2019-08-09 PROCEDURE — 93010 EKG 12-LEAD: ICD-10-PCS | Mod: 76,,, | Performed by: INTERNAL MEDICINE

## 2019-08-09 PROCEDURE — 85025 COMPLETE CBC W/AUTO DIFF WBC: CPT

## 2019-08-09 PROCEDURE — 93010 EKG 12-LEAD: ICD-10-PCS | Mod: ,,, | Performed by: INTERNAL MEDICINE

## 2019-08-09 PROCEDURE — 84300 ASSAY OF URINE SODIUM: CPT

## 2019-08-09 PROCEDURE — 21400001 HC TELEMETRY ROOM

## 2019-08-09 PROCEDURE — 82550 ASSAY OF CK (CPK): CPT | Mod: 91

## 2019-08-09 PROCEDURE — 99285 EMERGENCY DEPT VISIT HI MDM: CPT | Mod: 25

## 2019-08-09 PROCEDURE — 84100 ASSAY OF PHOSPHORUS: CPT

## 2019-08-09 PROCEDURE — 25000003 PHARM REV CODE 250: Performed by: INTERNAL MEDICINE

## 2019-08-09 PROCEDURE — 82803 BLOOD GASES ANY COMBINATION: CPT

## 2019-08-09 PROCEDURE — 99233 PR SUBSEQUENT HOSPITAL CARE,LEVL III: ICD-10-PCS | Mod: ,,, | Performed by: INTERNAL MEDICINE

## 2019-08-09 PROCEDURE — 94640 AIRWAY INHALATION TREATMENT: CPT

## 2019-08-09 PROCEDURE — 63600175 PHARM REV CODE 636 W HCPCS: Performed by: INTERNAL MEDICINE

## 2019-08-09 PROCEDURE — 94761 N-INVAS EAR/PLS OXIMETRY MLT: CPT

## 2019-08-09 PROCEDURE — S0171 BUMETANIDE 0.5 MG: HCPCS | Performed by: EMERGENCY MEDICINE

## 2019-08-09 PROCEDURE — 99900035 HC TECH TIME PER 15 MIN (STAT)

## 2019-08-09 PROCEDURE — 93010 ELECTROCARDIOGRAM REPORT: CPT | Mod: ,,, | Performed by: INTERNAL MEDICINE

## 2019-08-09 PROCEDURE — 81000 URINALYSIS NONAUTO W/SCOPE: CPT

## 2019-08-09 PROCEDURE — 93010 ELECTROCARDIOGRAM REPORT: CPT | Mod: 76,,, | Performed by: INTERNAL MEDICINE

## 2019-08-09 PROCEDURE — 99233 SBSQ HOSP IP/OBS HIGH 50: CPT | Mod: ,,, | Performed by: INTERNAL MEDICINE

## 2019-08-09 PROCEDURE — 82962 GLUCOSE BLOOD TEST: CPT

## 2019-08-09 PROCEDURE — 36600 WITHDRAWAL OF ARTERIAL BLOOD: CPT

## 2019-08-09 PROCEDURE — 83735 ASSAY OF MAGNESIUM: CPT

## 2019-08-09 PROCEDURE — 80053 COMPREHEN METABOLIC PANEL: CPT

## 2019-08-09 PROCEDURE — 96374 THER/PROPH/DIAG INJ IV PUSH: CPT

## 2019-08-09 RX ORDER — BUMETANIDE 0.25 MG/ML
1 INJECTION INTRAMUSCULAR; INTRAVENOUS
Status: COMPLETED | OUTPATIENT
Start: 2019-08-09 | End: 2019-08-09

## 2019-08-09 RX ORDER — IPRATROPIUM BROMIDE AND ALBUTEROL SULFATE 2.5; .5 MG/3ML; MG/3ML
3 SOLUTION RESPIRATORY (INHALATION)
Status: DISCONTINUED | OUTPATIENT
Start: 2019-08-09 | End: 2019-08-11 | Stop reason: HOSPADM

## 2019-08-09 RX ORDER — INSULIN ASPART 100 [IU]/ML
0-5 INJECTION, SOLUTION INTRAVENOUS; SUBCUTANEOUS
Status: DISCONTINUED | OUTPATIENT
Start: 2019-08-09 | End: 2019-08-11 | Stop reason: HOSPADM

## 2019-08-09 RX ORDER — ACETAMINOPHEN 325 MG/1
650 TABLET ORAL EVERY 4 HOURS PRN
Status: DISCONTINUED | OUTPATIENT
Start: 2019-08-09 | End: 2019-08-11 | Stop reason: HOSPADM

## 2019-08-09 RX ORDER — ONDANSETRON 2 MG/ML
4 INJECTION INTRAMUSCULAR; INTRAVENOUS EVERY 8 HOURS PRN
Status: DISCONTINUED | OUTPATIENT
Start: 2019-08-09 | End: 2019-08-11 | Stop reason: HOSPADM

## 2019-08-09 RX ORDER — ENOXAPARIN SODIUM 100 MG/ML
30 INJECTION SUBCUTANEOUS EVERY 24 HOURS
Status: DISCONTINUED | OUTPATIENT
Start: 2019-08-09 | End: 2019-08-09

## 2019-08-09 RX ORDER — CLOPIDOGREL BISULFATE 75 MG/1
75 TABLET ORAL DAILY
Status: DISCONTINUED | OUTPATIENT
Start: 2019-08-10 | End: 2019-08-11 | Stop reason: HOSPADM

## 2019-08-09 RX ORDER — SODIUM CHLORIDE 9 MG/ML
1000 INJECTION, SOLUTION INTRAVENOUS
Status: DISCONTINUED | OUTPATIENT
Start: 2019-08-09 | End: 2019-08-09

## 2019-08-09 RX ORDER — CYCLOBENZAPRINE HCL 10 MG
15 TABLET ORAL 2 TIMES DAILY
COMMUNITY

## 2019-08-09 RX ORDER — SODIUM CHLORIDE 0.9 % (FLUSH) 0.9 %
10 SYRINGE (ML) INJECTION
Status: DISCONTINUED | OUTPATIENT
Start: 2019-08-09 | End: 2019-08-11 | Stop reason: HOSPADM

## 2019-08-09 RX ORDER — AMIODARONE HYDROCHLORIDE 200 MG/1
200 TABLET ORAL DAILY
Status: DISCONTINUED | OUTPATIENT
Start: 2019-08-10 | End: 2019-08-11 | Stop reason: HOSPADM

## 2019-08-09 RX ORDER — METOPROLOL TARTRATE 25 MG/1
12.5 TABLET ORAL 2 TIMES DAILY
Status: DISCONTINUED | OUTPATIENT
Start: 2019-08-09 | End: 2019-08-11 | Stop reason: HOSPADM

## 2019-08-09 RX ORDER — BUMETANIDE 0.25 MG/ML
1 INJECTION INTRAMUSCULAR; INTRAVENOUS EVERY 12 HOURS
Status: DISCONTINUED | OUTPATIENT
Start: 2019-08-10 | End: 2019-08-09

## 2019-08-09 RX ORDER — BISACODYL 10 MG
10 SUPPOSITORY, RECTAL RECTAL DAILY PRN
Status: DISCONTINUED | OUTPATIENT
Start: 2019-08-09 | End: 2019-08-11 | Stop reason: HOSPADM

## 2019-08-09 RX ORDER — DEXTROSE 50 % IN WATER (D50W) INTRAVENOUS SYRINGE
25
Status: COMPLETED | OUTPATIENT
Start: 2019-08-09 | End: 2019-08-09

## 2019-08-09 RX ORDER — ENOXAPARIN SODIUM 100 MG/ML
30 INJECTION SUBCUTANEOUS EVERY 24 HOURS
Status: DISCONTINUED | OUTPATIENT
Start: 2019-08-09 | End: 2019-08-11 | Stop reason: HOSPADM

## 2019-08-09 RX ORDER — IBUPROFEN 200 MG
24 TABLET ORAL
Status: DISCONTINUED | OUTPATIENT
Start: 2019-08-09 | End: 2019-08-11 | Stop reason: HOSPADM

## 2019-08-09 RX ORDER — GLUCAGON 1 MG
1 KIT INJECTION
Status: DISCONTINUED | OUTPATIENT
Start: 2019-08-09 | End: 2019-08-11 | Stop reason: HOSPADM

## 2019-08-09 RX ORDER — LEVOTHYROXINE SODIUM 50 UG/1
50 TABLET ORAL
Status: DISCONTINUED | OUTPATIENT
Start: 2019-08-10 | End: 2019-08-11 | Stop reason: HOSPADM

## 2019-08-09 RX ORDER — PRAVASTATIN SODIUM 10 MG/1
10 TABLET ORAL NIGHTLY
Status: DISCONTINUED | OUTPATIENT
Start: 2019-08-09 | End: 2019-08-11 | Stop reason: HOSPADM

## 2019-08-09 RX ORDER — POLYETHYLENE GLYCOL 3350 17 G/17G
17 POWDER, FOR SOLUTION ORAL DAILY
Status: DISCONTINUED | OUTPATIENT
Start: 2019-08-10 | End: 2019-08-11 | Stop reason: HOSPADM

## 2019-08-09 RX ORDER — IPRATROPIUM BROMIDE AND ALBUTEROL SULFATE 2.5; .5 MG/3ML; MG/3ML
3 SOLUTION RESPIRATORY (INHALATION)
Status: COMPLETED | OUTPATIENT
Start: 2019-08-09 | End: 2019-08-09

## 2019-08-09 RX ORDER — DOPAMINE HYDROCHLORIDE 160 MG/100ML
2 INJECTION, SOLUTION INTRAVENOUS CONTINUOUS
Status: DISCONTINUED | OUTPATIENT
Start: 2019-08-09 | End: 2019-08-09

## 2019-08-09 RX ORDER — IPRATROPIUM BROMIDE AND ALBUTEROL SULFATE 2.5; .5 MG/3ML; MG/3ML
3 SOLUTION RESPIRATORY (INHALATION) EVERY 6 HOURS PRN
Status: COMPLETED | OUTPATIENT
Start: 2019-08-09 | End: 2019-08-11

## 2019-08-09 RX ORDER — IBUPROFEN 200 MG
16 TABLET ORAL
Status: DISCONTINUED | OUTPATIENT
Start: 2019-08-09 | End: 2019-08-11 | Stop reason: HOSPADM

## 2019-08-09 RX ORDER — TIZANIDINE 4 MG/1
4 TABLET ORAL 2 TIMES DAILY
COMMUNITY

## 2019-08-09 RX ORDER — FUROSEMIDE 80 MG/1
80 TABLET ORAL 2 TIMES DAILY
Status: ON HOLD | COMMUNITY
End: 2019-08-11 | Stop reason: HOSPADM

## 2019-08-09 RX ORDER — BUMETANIDE 0.25 MG/ML
2 INJECTION INTRAMUSCULAR; INTRAVENOUS EVERY 12 HOURS
Status: DISCONTINUED | OUTPATIENT
Start: 2019-08-09 | End: 2019-08-11 | Stop reason: HOSPADM

## 2019-08-09 RX ADMIN — IPRATROPIUM BROMIDE AND ALBUTEROL SULFATE 3 ML: .5; 3 SOLUTION RESPIRATORY (INHALATION) at 08:08

## 2019-08-09 RX ADMIN — ENOXAPARIN SODIUM 30 MG: 100 INJECTION SUBCUTANEOUS at 05:08

## 2019-08-09 RX ADMIN — BUMETANIDE 1 MG: 0.25 INJECTION INTRAMUSCULAR; INTRAVENOUS at 02:08

## 2019-08-09 RX ADMIN — IPRATROPIUM BROMIDE AND ALBUTEROL SULFATE 3 ML: .5; 3 SOLUTION RESPIRATORY (INHALATION) at 12:08

## 2019-08-09 RX ADMIN — BUMETANIDE 2 MG: 0.25 INJECTION INTRAMUSCULAR; INTRAVENOUS at 08:08

## 2019-08-09 RX ADMIN — Medication 25 G: at 01:08

## 2019-08-09 RX ADMIN — IPRATROPIUM BROMIDE AND ALBUTEROL SULFATE 3 ML: .5; 3 SOLUTION RESPIRATORY (INHALATION) at 05:08

## 2019-08-09 RX ADMIN — METOPROLOL TARTRATE 12.5 MG: 25 TABLET, FILM COATED ORAL at 08:08

## 2019-08-09 RX ADMIN — PRAVASTATIN SODIUM 10 MG: 10 TABLET ORAL at 08:08

## 2019-08-09 NOTE — ASSESSMENT & PLAN NOTE
Presenting with prog worsening dyspnea with LE swelling, weight gain, elevated probnp and CXR noting vascular congestion. Admit to telemetry. Troponins negative and EKG wo any new changes.  - Nephrology and cardiology consulted  - IV diuretics for now  - Cont BB (changed coreg to metoprolol due to low bp)  - Fluid restriction, Low sodium cardiac Diet  - Daily standing weight and strict ios  - Optimize electrolytes (K>4 & Mg > 2)  - Daily LABS - cbc, bmp, and mg

## 2019-08-09 NOTE — ASSESSMENT & PLAN NOTE
Known ischemic CM with EF 25%. Diuresis and afterload reduction as tolerated. F/U with [facility] after discharge. Will f/u prn

## 2019-08-09 NOTE — H&P
[facility]  History & Physical    Patient Name: Charu Oswadl  MRN: [id number]  Admission Date: 8/9/2019  Attending Physician: Nitin Fernandez MD   Primary Care Provider: Gerda Carter MD       Patient information was obtained from patient and ER records.     Subjective:     Principal Problem:Acute on chronic combined systolic and diastolic congestive heart failure    Chief Complaint:   Chief Complaint   Patient presents with    Generalize weakness     Onset x 2 day.  Pt reports being at MD office and was sent to ER r/t weakness with HR 60.  Denies n/v, dizziness, or fever    Bradycardia     HR 60        HPI: 62 yo AA female with hx of Ischemic cardiomyopathy with EF 25% with ICD, LLLB, COPD, tobacco abuse, hypertension, hyperlipidemia, CKD stage 4, severe PVD, Hypothyroidism, presenting with progressively worsening dyspnea for the past several days with associated worsening LE swelling and orthopnea. Denies any fevers, chills, syncope, productive cough, cp, palpitation, PND, abdominal pain, nausea, emesis, dysuria, bleeds. Has had several admissions in the past for CHF exacerbation, most recently few weeks back to [facility]. She states to be complaint with fluid restriction and salt restriction at home. Does not check weight daily.     In the ED patient was hemodynamically stable, afebrile, saturating well on RA. Labs significant BNP ~ 3000, trop neg, Cr 4.2, bun 45, wbc 3.77, glucose 51. CXR concern for pulmonary congestion.  requested admission for CHF exacerbation with RIKA on CKD.        ECHO 6/2019  · Technically difficult study  · Severely decreased left ventricular systolic function. The estimated ejection fraction is 25-30%  · Mild concentric left ventricular hypertrophy.  · Moderate left atrial enlargement.  · Grade III (severe) left ventricular diastolic dysfunction consistent with restrictive physiology.  · Elevated left atrial pressure.  · Mild right ventricular  enlargement.  · Mild right atrial enlargement.  · Mild aortic regurgitation.  · Mild to moderate tricuspid regurgitation.  · Elevated central venous pressure (15 mm Hg).  · The estimated PA systolic pressure is 57 mm Hg  Pulmonary hypertension present.    Past Medical History:   Diagnosis Date    AICD (automatic cardioverter/defibrillator) present     Ambulates with cane     Asthma     CKD (chronic kidney disease), stage IV     Congestive heart failure     COPD (chronic obstructive pulmonary disease)     Diabetes mellitus     Type II    History of ventricular fibrillation     with defibrillation, AICD placed in 2016    Hyperlipidemia     Pacemaker     Pedal edema     Unsteady gait        Past Surgical History:   Procedure Laterality Date    Cardiac bypass  1994    CARDIAC PACEMAKER PLACEMENT Left 2016    AICD       Review of patient's allergies indicates:   Allergen Reactions    Peanut Shortness Of Breath       No current facility-administered medications on file prior to encounter.      Current Outpatient Medications on File Prior to Encounter   Medication Sig    albuterol (PROVENTIL/VENTOLIN HFA) 90 mcg/actuation inhaler Inhale 1-2 puffs into the lungs every 6 (six) hours as needed for Wheezing. Rescue    albuterol-ipratropium (DUO-NEB) 2.5 mg-0.5 mg/3 mL nebulizer solution Take 3 mLs by nebulization every 6 (six) hours while awake. Rescue    albuterol-ipratropium (DUO-NEB) 2.5 mg-0.5 mg/3 mL nebulizer solution Take 3 mLs by nebulization every 6 (six) hours as needed for Wheezing. Rescue    amiodarone (PACERONE) 200 MG Tab TAKE 1 TABLET BY MOUTH IN THE MORNING AND A HALF TABLET BY MOUTH IN THE EVENING    carvedilol (COREG) 6.25 MG tablet TK 1 T PO  BID    clopidogrel (PLAVIX) 75 mg tablet Take 75 mg by mouth once daily.    fluticasone propionate (FLONASE) 50 mcg/actuation nasal spray 2 sprays (100 mcg total) by Each Nare route once daily.    furosemide (LASIX) 40 MG tablet Take 1 tablet (40  mg total) by mouth once daily.    levothyroxine (SYNTHROID) 50 MCG tablet Take 50 mcg by mouth once daily.    loratadine (CLARITIN) 10 mg tablet Take 1 tablet (10 mg total) by mouth once daily.    potassium chloride SA (K-DUR,KLOR-CON) 10 MEQ tablet Take 10 mEq by mouth every other day.    pravastatin (PRAVACHOL) 10 MG tablet TK 1 T PO QHS     Family History     Problem Relation (Age of Onset)    Breast cancer Mother (50)    Cancer Mother    Diabetes Brother, Sister    Heart disease Father, Mother        Tobacco Use    Smoking status: Former Smoker    Smokeless tobacco: Never Used   Substance and Sexual Activity    Alcohol use: No    Drug use: No    Sexual activity: Never     Partners: Male     Review of Systems   Constitutional: Negative for chills, diaphoresis, fatigue and fever.   HENT: Negative.    Eyes: Negative.    Respiratory: Positive for shortness of breath and wheezing. Negative for apnea, cough, choking, chest tightness and stridor.    Cardiovascular: Positive for leg swelling. Negative for chest pain and palpitations.   Gastrointestinal: Negative.    Genitourinary: Negative.    Musculoskeletal: Negative.    Skin: Negative for color change, pallor, rash and wound.   Hematological: Negative.    Psychiatric/Behavioral: Negative.      Objective:     Vital Signs (Most Recent):  Temp: 97.6 °F (36.4 °C) (08/09/19 1639)  Pulse: 66 (08/09/19 1701)  Resp: 18 (08/09/19 1701)  BP: 111/61 (08/09/19 1639)  SpO2: 98 % (08/09/19 1701) Vital Signs (24h Range):  Temp:  [97.6 °F (36.4 °C)-97.7 °F (36.5 °C)] 97.6 °F (36.4 °C)  Pulse:  [61-67] 66  Resp:  [16-26] 18  SpO2:  [93 %-100 %] 98 %  BP: (102-122)/(55-78) 111/61     Weight: 96.3 kg (212 lb 4.8 oz)  Body mass index is 38.83 kg/m².    Physical Exam   Constitutional: She is oriented to person, place, and time. She appears well-developed and well-nourished. No distress.   HENT:   Head: Normocephalic and atraumatic.   Mouth/Throat: No oropharyngeal exudate.    Eyes: Pupils are equal, round, and reactive to light. EOM are normal. No scleral icterus.   Neck: Normal range of motion. Neck supple. JVD present.   Pulmonary/Chest: Effort normal. She has wheezes. She has rales.   Decreased bibasilar BS with bibasilar rales. Bilateral wheeze.   Abdominal: Soft. Bowel sounds are normal. She exhibits no distension. There is no tenderness.   Musculoskeletal: Normal range of motion. She exhibits edema (+3 bilateral LE edema).   Neurological: She is alert and oriented to person, place, and time.   Skin: Skin is warm and dry. Capillary refill takes less than 2 seconds. No rash noted. She is not diaphoretic. No erythema. No pallor.   Psychiatric: She has a normal mood and affect. Her behavior is normal. Judgment and thought content normal.   Nursing note and vitals reviewed.        CRANIAL NERVES     CN III, IV, VI   Pupils are equal, round, and reactive to light.  Extraocular motions are normal.        Significant Labs: All pertinent labs within the past 24 hours have been reviewed.    Significant Imaging: I have reviewed and interpreted all pertinent imaging results/findings within the past 24 hours.    Assessment/Plan:     * Acute on chronic combined systolic and diastolic congestive heart failure  Presenting with prog worsening dyspnea with LE swelling, weight gain, elevated probnp and CXR noting vascular congestion. Admit to telemetry. Troponins negative and EKG wo any new changes.  - Nephrology and cardiology consulted  - IV diuretics for now  - Cont BB (changed coreg to metoprolol due to low bp)  - Fluid restriction, Low sodium cardiac Diet  - Daily standing weight and strict ios  - Optimize electrolytes (K>4 & Mg > 2)  - Daily LABS - cbc, bmp, and mg    Acute renal failure superimposed on stage 4 chronic kidney disease  Baseline Cr 2-3. On admission 4.2.   Nephrology consulted  ?CRS, bumex as above  Renally dose meds, and avoid nephrotoxic meds as able    Diabetes type 2,  uncontrolled  Hypoglycemic on admission. Hold oral med and insulin for now. SSI low dose with accuchecks. Resume insulin as able. Continue asa and statin.    Ischemic cardiomyopathy  As above. Continue asa, plavix, statin and BB    AICD (automatic cardioverter/defibrillator) present  Noted.    Hyperlipidemia  Resume home statin      VTE Risk Mitigation (From admission, onward)        Ordered     enoxaparin injection 30 mg  Daily      08/09/19 1710     IP VTE HIGH RISK PATIENT  Once      08/09/19 1705     Place sequential compression device  Until discontinued      08/09/19 1636             Nitin Fernandez MD  Department of Hospital Medicine   [facility]

## 2019-08-09 NOTE — ASSESSMENT & PLAN NOTE
Hypoglycemic on admission. Hold oral med and insulin for now. SSI low dose with accuchecks. Resume insulin as able. Continue asa and statin.

## 2019-08-09 NOTE — HPI
62 yo AA female with hx of Ischemic cardiomyopathy with EF 25% with ICD, LLLB, COPD, tobacco abuse, hypertension, hyperlipidemia, CKD stage 4, severe PVD, Hypothyroidism, presenting with progressively worsening dyspnea for the past several days with associated worsening LE swelling and orthopnea. Denies any fevers, chills, syncope, productive cough, cp, palpitation, PND, abdominal pain, nausea, emesis, dysuria, bleeds. Has had several admissions in the past for CHF exacerbation, most recently few weeks back to [facility]. She states to be complaint with fluid restriction and salt restriction at home. Does not check weight daily.     In the ED patient was hemodynamically stable, afebrile, saturating well on RA. Labs significant BNP ~ 3000, trop neg, Cr 4.2, bun 45, wbc 3.77, glucose 51. CXR concern for pulmonary congestion. HM requested admission for CHF exacerbation with RIKA on CKD.        ECHO 6/2019  · Technically difficult study  · Severely decreased left ventricular systolic function. The estimated ejection fraction is 25-30%  · Mild concentric left ventricular hypertrophy.  · Moderate left atrial enlargement.  · Grade III (severe) left ventricular diastolic dysfunction consistent with restrictive physiology.  · Elevated left atrial pressure.  · Mild right ventricular enlargement.  · Mild right atrial enlargement.  · Mild aortic regurgitation.  · Mild to moderate tricuspid regurgitation.  · Elevated central venous pressure (15 mm Hg).  · The estimated PA systolic pressure is 57 mm Hg  · Pulmonary hypertension present.

## 2019-08-09 NOTE — NURSING
9271- patient arrived by stretcher to assigned room with her  placed on telemetry box and oriented to room , call system , purpose of bed alarm to prevent falls . Patient agreeable . Bed alarm on. Side rails up x 3 for safety and explained too. Plan of care updated with patient too. Assessment completed . Foot of bed elevated too.

## 2019-08-09 NOTE — SUBJECTIVE & OBJECTIVE
Past Medical History:   Diagnosis Date    AICD (automatic cardioverter/defibrillator) present     Ambulates with cane     Asthma     CKD (chronic kidney disease), stage IV     Congestive heart failure     COPD (chronic obstructive pulmonary disease)     Diabetes mellitus     Type II    History of ventricular fibrillation     with defibrillation, AICD placed in 2016    Hyperlipidemia     Pacemaker     Pedal edema     Unsteady gait        Past Surgical History:   Procedure Laterality Date    Cardiac bypass  1994    CARDIAC PACEMAKER PLACEMENT Left 2016    AICD       Review of patient's allergies indicates:   Allergen Reactions    Peanut Shortness Of Breath       No current facility-administered medications on file prior to encounter.      Current Outpatient Medications on File Prior to Encounter   Medication Sig    albuterol (PROVENTIL/VENTOLIN HFA) 90 mcg/actuation inhaler Inhale 1-2 puffs into the lungs every 6 (six) hours as needed for Wheezing. Rescue    albuterol-ipratropium (DUO-NEB) 2.5 mg-0.5 mg/3 mL nebulizer solution Take 3 mLs by nebulization every 6 (six) hours while awake. Rescue    albuterol-ipratropium (DUO-NEB) 2.5 mg-0.5 mg/3 mL nebulizer solution Take 3 mLs by nebulization every 6 (six) hours as needed for Wheezing. Rescue    amiodarone (PACERONE) 200 MG Tab TAKE 1 TABLET BY MOUTH IN THE MORNING AND A HALF TABLET BY MOUTH IN THE EVENING    carvedilol (COREG) 6.25 MG tablet TK 1 T PO  BID    clopidogrel (PLAVIX) 75 mg tablet Take 75 mg by mouth once daily.    fluticasone propionate (FLONASE) 50 mcg/actuation nasal spray 2 sprays (100 mcg total) by Each Nare route once daily.    furosemide (LASIX) 40 MG tablet Take 1 tablet (40 mg total) by mouth once daily.    levothyroxine (SYNTHROID) 50 MCG tablet Take 50 mcg by mouth once daily.    loratadine (CLARITIN) 10 mg tablet Take 1 tablet (10 mg total) by mouth once daily.    potassium chloride SA (K-DUR,KLOR-CON) 10 MEQ tablet  Take 10 mEq by mouth every other day.    pravastatin (PRAVACHOL) 10 MG tablet TK 1 T PO QHS     Family History     Problem Relation (Age of Onset)    Breast cancer Mother (50)    Cancer Mother    Diabetes Brother, Sister    Heart disease Father, Mother        Tobacco Use    Smoking status: Former Smoker    Smokeless tobacco: Never Used   Substance and Sexual Activity    Alcohol use: No    Drug use: No    Sexual activity: Never     Partners: Male     Review of Systems   Constitutional: Negative for chills, diaphoresis, fatigue and fever.   HENT: Negative.    Eyes: Negative.    Respiratory: Positive for shortness of breath and wheezing. Negative for apnea, cough, choking, chest tightness and stridor.    Cardiovascular: Positive for leg swelling. Negative for chest pain and palpitations.   Gastrointestinal: Negative.    Genitourinary: Negative.    Musculoskeletal: Negative.    Skin: Negative for color change, pallor, rash and wound.   Hematological: Negative.    Psychiatric/Behavioral: Negative.      Objective:     Vital Signs (Most Recent):  Temp: 97.6 °F (36.4 °C) (08/09/19 1639)  Pulse: 66 (08/09/19 1701)  Resp: 18 (08/09/19 1701)  BP: 111/61 (08/09/19 1639)  SpO2: 98 % (08/09/19 1701) Vital Signs (24h Range):  Temp:  [97.6 °F (36.4 °C)-97.7 °F (36.5 °C)] 97.6 °F (36.4 °C)  Pulse:  [61-67] 66  Resp:  [16-26] 18  SpO2:  [93 %-100 %] 98 %  BP: (102-122)/(55-78) 111/61     Weight: 96.3 kg (212 lb 4.8 oz)  Body mass index is 38.83 kg/m².    Physical Exam   Constitutional: She is oriented to person, place, and time. She appears well-developed and well-nourished. No distress.   HENT:   Head: Normocephalic and atraumatic.   Mouth/Throat: No oropharyngeal exudate.   Eyes: Pupils are equal, round, and reactive to light. EOM are normal. No scleral icterus.   Neck: Normal range of motion. Neck supple. JVD present.   Pulmonary/Chest: Effort normal. She has wheezes. She has rales.   Decreased bibasilar BS with bibasilar  rales. Bilateral wheeze.   Abdominal: Soft. Bowel sounds are normal. She exhibits no distension. There is no tenderness.   Musculoskeletal: Normal range of motion. She exhibits edema (+3 bilateral LE edema).   Neurological: She is alert and oriented to person, place, and time.   Skin: Skin is warm and dry. Capillary refill takes less than 2 seconds. No rash noted. She is not diaphoretic. No erythema. No pallor.   Psychiatric: She has a normal mood and affect. Her behavior is normal. Judgment and thought content normal.   Nursing note and vitals reviewed.        CRANIAL NERVES     CN III, IV, VI   Pupils are equal, round, and reactive to light.  Extraocular motions are normal.        Significant Labs: All pertinent labs within the past 24 hours have been reviewed.    Significant Imaging: I have reviewed and interpreted all pertinent imaging results/findings within the past 24 hours.

## 2019-08-09 NOTE — ASSESSMENT & PLAN NOTE
Baseline Cr 2-3. On admission 4.2.   Nephrology consulted  ?CRS, bumex as above  Renally dose meds, and avoid nephrotoxic meds as able

## 2019-08-09 NOTE — CONSULTS
[facility]  Cardiology  Consult Note    Patient Name: Charu Oswald  MRN: [id number]  Admission Date: 8/9/2019  Hospital Length of Stay: 0 days  Code Status: Full Code   Attending Provider: Nitin Fernandez MD   Consulting Provider: Patrick Calhoun MD  Primary Care Physician: Gerda Carter MD  Principal Problem:<principal problem not specified>    Patient information was obtained from patient and ER records.     Consults  Subjective:     Chief Complaint:  CHF     HPI:       Onset x 2 day.  Pt reports being at MD office and was sent to ER r/t weakness with HR 60.  Denies n/v, dizziness, or fever    Bradycardia       HR 60      63 y.o. female Past Medical History:  No date: Asthma  No date: Congestive heart failure  No date: Diabetes mellitus      Comment:  Type II  No date: Hyperlipidemia  No date: Pacemaker  No date: Pedal edema         Notes that she has been sob for the last few days. Feels very weak. Was recently admitted at  for 5 days for: Acute on chronic combined systolic and diastolic congestive heart failure 07/16/2019    Essential hypertension 07/16/2019    Type 2 diabetes mellitus with diabetic chronic kidney disease 07/16/2019    CKD (chronic kidney disease), stage IV 05/01/2019    Obesity    Ischemic cardiomyopathy    AICD (automatic cardioverter/defibrillator) present         Notes she did not take her am meds today.     Echo 6/2019  ·           Technically difficult study  ·           Severely decreased left ventricular systolic function. The estimated ejection fraction is 25-30%  ·           Mild concentric left ventricular hypertrophy.  ·           Moderate left atrial enlargement.  ·           Grade III (severe) left ventricular diastolic dysfunction consistent with restrictive physiology.  ·           Elevated left atrial pressure.  ·           Mild right ventricular enlargement.  ·           Mild right atrial enlargement.  ·           Mild aortic regurgitation.  ·            Mild to moderate tricuspid regurgitation.  ·           Elevated central venous pressure (15 mm Hg).  ·           The estimated PA systolic pressure is 57 mm Hg  ·           Pulmonary hypertension present.     Currently denies CP  BNP 3096  Troponin negative  Cr 4.2    Followed by [facility]  Ischemic CM - CAD/CABG 94, AICD - Biotronik  CRI    1. Peripheral vascular disease and bilateral claudication  2. Ischemic cardiomyopathy  3. ICD  4. Stage 4 chronic kidney disease  5. Chronic systolic heart failure  5. Left bundle branch block pattern on EKG  6. Diabetes mellitus type 2 on insulin  7. Coronary artery disease  8. Hypothyroidism  10. Patient on chronic amiodarone  11. Chronic systolic heart failure stable at this time.    This is a very sweet lady who I have known for some time. I last saw her back in April. She has claudication, evidence of severe vascular disease, ischemic cardiomyopathy, chronic systolic heart failure, ICD, stage 4 chronic kidney disease, diabetes mellitus type 2 insulin dependent, and amiodarone.    The patient was referred over here today because of a high proBNP. She has had no PND orthopnea or change in shortness of breath.    She has had no increase in edema.    Her kidney specialist want to place an AV fistula from what I understand but she is hesitant at this time.    She has been admitted from time to time with heart failure but her heart failure stable right now.      Past Medical History:   Diagnosis Date    AICD (automatic cardioverter/defibrillator) present     Ambulates with cane     Asthma     CKD (chronic kidney disease), stage IV     Congestive heart failure     COPD (chronic obstructive pulmonary disease)     Diabetes mellitus     Type II    History of ventricular fibrillation     with defibrillation, AICD placed in 2016    Hyperlipidemia     Pacemaker     Pedal edema     Unsteady gait        Past Surgical History:   Procedure Laterality Date    Cardiac  bypass  1994    CARDIAC PACEMAKER PLACEMENT Left 2016    AICD       Review of patient's allergies indicates:   Allergen Reactions    Nut - unspecified Shortness Of Breath       No current facility-administered medications on file prior to encounter.      Current Outpatient Medications on File Prior to Encounter   Medication Sig    albuterol (PROVENTIL/VENTOLIN HFA) 90 mcg/actuation inhaler Inhale 1-2 puffs into the lungs every 6 (six) hours as needed for Wheezing. Rescue    albuterol-ipratropium (DUO-NEB) 2.5 mg-0.5 mg/3 mL nebulizer solution Take 3 mLs by nebulization every 6 (six) hours while awake. Rescue    albuterol-ipratropium (DUO-NEB) 2.5 mg-0.5 mg/3 mL nebulizer solution Take 3 mLs by nebulization every 6 (six) hours as needed for Wheezing. Rescue    amiodarone (PACERONE) 200 MG Tab TAKE 1 TABLET BY MOUTH IN THE MORNING AND A HALF TABLET BY MOUTH IN THE EVENING    carvedilol (COREG) 6.25 MG tablet TK 1 T PO  BID    clopidogrel (PLAVIX) 75 mg tablet Take 75 mg by mouth once daily.    fluticasone propionate (FLONASE) 50 mcg/actuation nasal spray 2 sprays (100 mcg total) by Each Nare route once daily.    furosemide (LASIX) 40 MG tablet Take 1 tablet (40 mg total) by mouth once daily.    levothyroxine (SYNTHROID) 50 MCG tablet Take 50 mcg by mouth once daily.    loratadine (CLARITIN) 10 mg tablet Take 1 tablet (10 mg total) by mouth once daily.    potassium chloride SA (K-DUR,KLOR-CON) 10 MEQ tablet Take 10 mEq by mouth every other day.    pravastatin (PRAVACHOL) 10 MG tablet TK 1 T PO QHS     Family History     Problem Relation (Age of Onset)    Breast cancer Mother (50)    Cancer Mother    Diabetes Brother, Sister    Heart disease Father, Mother        Tobacco Use    Smoking status: Former Smoker    Smokeless tobacco: Never Used   Substance and Sexual Activity    Alcohol use: No    Drug use: No    Sexual activity: Never     Partners: Male     Review of Systems   Constitution: Negative for  decreased appetite.   HENT: Negative for ear discharge.    Eyes: Negative for blurred vision.   Respiratory: Negative for hemoptysis.    Endocrine: Negative for polyphagia.   Hematologic/Lymphatic: Negative for adenopathy.   Skin: Negative for color change.   Musculoskeletal: Negative for joint swelling.   Genitourinary: Negative for bladder incontinence.   Neurological: Negative for brief paralysis.   Psychiatric/Behavioral: Negative for hallucinations.   Allergic/Immunologic: Negative for hives.     Objective:     Vital Signs (Most Recent):  Temp: 97.6 °F (36.4 °C) (08/09/19 1639)  Pulse: 66 (08/09/19 1701)  Resp: 18 (08/09/19 1701)  BP: 111/61 (08/09/19 1639)  SpO2: 98 % (08/09/19 1701) Vital Signs (24h Range):  Temp:  [97.6 °F (36.4 °C)-97.7 °F (36.5 °C)] 97.6 °F (36.4 °C)  Pulse:  [61-67] 66  Resp:  [16-26] 18  SpO2:  [93 %-100 %] 98 %  BP: (102-122)/(55-78) 111/61     Weight: 86.2 kg (190 lb)  Body mass index is 34.75 kg/m².    SpO2: 98 %  O2 Device (Oxygen Therapy): room air    No intake or output data in the 24 hours ending 08/09/19 1704    Lines/Drains/Airways     Peripheral Intravenous Line                 Peripheral IV - Single Lumen 08/09/19 1202 18 G Left Antecubital less than 1 day         Peripheral IV - Single Lumen 08/09/19 1329 20 G Right Antecubital less than 1 day                Physical Exam   Constitutional: She is oriented to person, place, and time. She appears well-developed and well-nourished.   HENT:   Head: Normocephalic and atraumatic.   Eyes: Pupils are equal, round, and reactive to light. Conjunctivae are normal.   Neck: Normal range of motion. Neck supple.   Cardiovascular: Normal rate, normal heart sounds and intact distal pulses.   Pulmonary/Chest: Effort normal and breath sounds normal.   Abdominal: Soft. Bowel sounds are normal.   Musculoskeletal: Normal range of motion. She exhibits edema.   Neurological: She is alert and oriented to person, place, and time.   Skin: Skin is warm  and dry.       Significant Labs: All pertinent lab results from the last 24 hours have been reviewed.    Significant Imaging: Echocardiogram: 2D echo with color flow doppler: No results found for this or any previous visit.    Assessment and Plan:     Ischemic cardiomyopathy  CABG 1994. Denies CP. No MI. Will not repeat ischemic evaluation at this time    CKD (chronic kidney disease), stage IV  Per primary    AICD (automatic cardioverter/defibrillator) present  Biotronik AICD. Recent check at [facility] with good device function    CHF (congestive heart failure)  Known ischemic CM with EF 25%. Diuresis and afterload reduction as tolerated. F/U with Heart Clinic after discharge. Will f/u prn    Diabetes mellitus  Per primary        VTE Risk Mitigation (From admission, onward)        Ordered     enoxaparin injection 30 mg  Daily      08/09/19 1705     IP VTE HIGH RISK PATIENT  Once      08/09/19 1705     Place sequential compression device  Until discontinued      08/09/19 1636          Thank you for your consult. I will sign off. Please contact us if you have any additional questions.    Patrick Calhoun MD  Cardiology   [facility]

## 2019-08-09 NOTE — SUBJECTIVE & OBJECTIVE
Past Medical History:   Diagnosis Date    AICD (automatic cardioverter/defibrillator) present     Ambulates with cane     Asthma     CKD (chronic kidney disease), stage IV     Congestive heart failure     COPD (chronic obstructive pulmonary disease)     Diabetes mellitus     Type II    History of ventricular fibrillation     with defibrillation, AICD placed in 2016    Hyperlipidemia     Pacemaker     Pedal edema     Unsteady gait        Past Surgical History:   Procedure Laterality Date    Cardiac bypass  1994    CARDIAC PACEMAKER PLACEMENT Left 2016    AICD       Review of patient's allergies indicates:   Allergen Reactions    Nut - unspecified Shortness Of Breath       No current facility-administered medications on file prior to encounter.      Current Outpatient Medications on File Prior to Encounter   Medication Sig    albuterol (PROVENTIL/VENTOLIN HFA) 90 mcg/actuation inhaler Inhale 1-2 puffs into the lungs every 6 (six) hours as needed for Wheezing. Rescue    albuterol-ipratropium (DUO-NEB) 2.5 mg-0.5 mg/3 mL nebulizer solution Take 3 mLs by nebulization every 6 (six) hours while awake. Rescue    albuterol-ipratropium (DUO-NEB) 2.5 mg-0.5 mg/3 mL nebulizer solution Take 3 mLs by nebulization every 6 (six) hours as needed for Wheezing. Rescue    amiodarone (PACERONE) 200 MG Tab TAKE 1 TABLET BY MOUTH IN THE MORNING AND A HALF TABLET BY MOUTH IN THE EVENING    carvedilol (COREG) 6.25 MG tablet TK 1 T PO  BID    clopidogrel (PLAVIX) 75 mg tablet Take 75 mg by mouth once daily.    fluticasone propionate (FLONASE) 50 mcg/actuation nasal spray 2 sprays (100 mcg total) by Each Nare route once daily.    furosemide (LASIX) 40 MG tablet Take 1 tablet (40 mg total) by mouth once daily.    levothyroxine (SYNTHROID) 50 MCG tablet Take 50 mcg by mouth once daily.    loratadine (CLARITIN) 10 mg tablet Take 1 tablet (10 mg total) by mouth once daily.    potassium chloride SA (K-DUR,KLOR-CON) 10 MEQ  tablet Take 10 mEq by mouth every other day.    pravastatin (PRAVACHOL) 10 MG tablet TK 1 T PO QHS     Family History     Problem Relation (Age of Onset)    Breast cancer Mother (50)    Cancer Mother    Diabetes Brother, Sister    Heart disease Father, Mother        Tobacco Use    Smoking status: Former Smoker    Smokeless tobacco: Never Used   Substance and Sexual Activity    Alcohol use: No    Drug use: No    Sexual activity: Never     Partners: Male     Review of Systems   Constitution: Negative for decreased appetite.   HENT: Negative for ear discharge.    Eyes: Negative for blurred vision.   Respiratory: Negative for hemoptysis.    Endocrine: Negative for polyphagia.   Hematologic/Lymphatic: Negative for adenopathy.   Skin: Negative for color change.   Musculoskeletal: Negative for joint swelling.   Genitourinary: Negative for bladder incontinence.   Neurological: Negative for brief paralysis.   Psychiatric/Behavioral: Negative for hallucinations.   Allergic/Immunologic: Negative for hives.     Objective:     Vital Signs (Most Recent):  Temp: 97.6 °F (36.4 °C) (08/09/19 1639)  Pulse: 66 (08/09/19 1701)  Resp: 18 (08/09/19 1701)  BP: 111/61 (08/09/19 1639)  SpO2: 98 % (08/09/19 1701) Vital Signs (24h Range):  Temp:  [97.6 °F (36.4 °C)-97.7 °F (36.5 °C)] 97.6 °F (36.4 °C)  Pulse:  [61-67] 66  Resp:  [16-26] 18  SpO2:  [93 %-100 %] 98 %  BP: (102-122)/(55-78) 111/61     Weight: 86.2 kg (190 lb)  Body mass index is 34.75 kg/m².    SpO2: 98 %  O2 Device (Oxygen Therapy): room air    No intake or output data in the 24 hours ending 08/09/19 1704    Lines/Drains/Airways     Peripheral Intravenous Line                 Peripheral IV - Single Lumen 08/09/19 1202 18 G Left Antecubital less than 1 day         Peripheral IV - Single Lumen 08/09/19 1329 20 G Right Antecubital less than 1 day                Physical Exam   Constitutional: She is oriented to person, place, and time. She appears well-developed and  well-nourished.   HENT:   Head: Normocephalic and atraumatic.   Eyes: Pupils are equal, round, and reactive to light. Conjunctivae are normal.   Neck: Normal range of motion. Neck supple.   Cardiovascular: Normal rate, normal heart sounds and intact distal pulses.   Pulmonary/Chest: Effort normal and breath sounds normal.   Abdominal: Soft. Bowel sounds are normal.   Musculoskeletal: Normal range of motion. She exhibits edema.   Neurological: She is alert and oriented to person, place, and time.   Skin: Skin is warm and dry.       Significant Labs: All pertinent lab results from the last 24 hours have been reviewed.    Significant Imaging: Echocardiogram: 2D echo with color flow doppler: No results found for this or any previous visit.

## 2019-08-09 NOTE — PLAN OF CARE
Problem: Adult Inpatient Plan of Care  Goal: Patient-Specific Goal (Individualization)  Outcome: Ongoing (interventions implemented as appropriate)   Pt remains on RA with complaints of SOB/wheezing. Continue duoneb svn tx as ordered. Will continue to monitor. CHALO Montoya, RRT

## 2019-08-09 NOTE — HPI
Onset x 2 day.  Pt reports being at MD office and was sent to ER r/t weakness with HR 60.  Denies n/v, dizziness, or fever    Bradycardia       HR 60      63 y.o. female Past Medical History:  No date: Asthma  No date: Congestive heart failure  No date: Diabetes mellitus      Comment:  Type II  No date: Hyperlipidemia  No date: Pacemaker  No date: Pedal edema         Notes that she has been sob for the last few days. Feels very weak. Was recently admitted at  for 5 days for: Acute on chronic combined systolic and diastolic congestive heart failure 07/16/2019    Essential hypertension 07/16/2019    Type 2 diabetes mellitus with diabetic chronic kidney disease 07/16/2019    CKD (chronic kidney disease), stage IV 05/01/2019    Obesity    Ischemic cardiomyopathy    AICD (automatic cardioverter/defibrillator) present         Notes she did not take her am meds today.     Echo 6/2019  ·           Technically difficult study  ·           Severely decreased left ventricular systolic function. The estimated ejection fraction is 25-30%  ·           Mild concentric left ventricular hypertrophy.  ·           Moderate left atrial enlargement.  ·           Grade III (severe) left ventricular diastolic dysfunction consistent with restrictive physiology.  ·           Elevated left atrial pressure.  ·           Mild right ventricular enlargement.  ·           Mild right atrial enlargement.  ·           Mild aortic regurgitation.  ·           Mild to moderate tricuspid regurgitation.  ·           Elevated central venous pressure (15 mm Hg).  ·           The estimated PA systolic pressure is 57 mm Hg  ·           Pulmonary hypertension present.     Currently denies CP  BNP 3096  Troponin negative  Cr 4.2    Followed by [facility] Clinic  Ischemic CM - CAD/CABG 94, AICD - Biotronik  CRI    1. Peripheral vascular disease and bilateral claudication  2. Ischemic cardiomyopathy  3. ICD  4. Stage 4 chronic kidney disease  5. Chronic  systolic heart failure  5. Left bundle branch block pattern on EKG  6. Diabetes mellitus type 2 on insulin  7. Coronary artery disease  8. Hypothyroidism  10. Patient on chronic amiodarone  11. Chronic systolic heart failure stable at this time.    This is a very sweet lady who I have known for some time. I last saw her back in April. She has claudication, evidence of severe vascular disease, ischemic cardiomyopathy, chronic systolic heart failure, ICD, stage 4 chronic kidney disease, diabetes mellitus type 2 insulin dependent, and amiodarone.    The patient was referred over here today because of a high proBNP. She has had no PND orthopnea or change in shortness of breath.    She has had no increase in edema.    Her kidney specialist want to place an AV fistula from what I understand but she is hesitant at this time.    She has been admitted from time to time with heart failure but her heart failure stable right now.

## 2019-08-10 PROBLEM — R93.429 ABNORMAL RENAL ULTRASOUND: Status: ACTIVE | Noted: 2019-08-10

## 2019-08-10 LAB
ALBUMIN SERPL BCP-MCNC: 3.5 G/DL (ref 3.5–5.2)
ALP SERPL-CCNC: 270 U/L (ref 55–135)
ALT SERPL W/O P-5'-P-CCNC: 12 U/L (ref 10–44)
ANION GAP SERPL CALC-SCNC: 15 MMOL/L (ref 8–16)
ANISOCYTOSIS BLD QL SMEAR: SLIGHT
AST SERPL-CCNC: 23 U/L (ref 10–40)
BASOPHILS # BLD AUTO: 0 K/UL (ref 0–0.2)
BASOPHILS NFR BLD: 0 % (ref 0–1.9)
BILIRUB SERPL-MCNC: 0.8 MG/DL (ref 0.1–1)
BILIRUB UR QL STRIP: NEGATIVE
BUN SERPL-MCNC: 47 MG/DL (ref 8–23)
BURR CELLS BLD QL SMEAR: ABNORMAL
CALCIUM SERPL-MCNC: 9.3 MG/DL (ref 8.7–10.5)
CHLORIDE SERPL-SCNC: 88 MMOL/L (ref 95–110)
CLARITY UR: CLEAR
CO2 SERPL-SCNC: 29 MMOL/L (ref 23–29)
COLOR UR: YELLOW
CREAT SERPL-MCNC: 4.2 MG/DL (ref 0.5–1.4)
DACRYOCYTES BLD QL SMEAR: ABNORMAL
DIFFERENTIAL METHOD: ABNORMAL
EOSINOPHIL # BLD AUTO: 0.1 K/UL (ref 0–0.5)
EOSINOPHIL NFR BLD: 2.2 % (ref 0–8)
ERYTHROCYTE [DISTWIDTH] IN BLOOD BY AUTOMATED COUNT: 18.5 % (ref 11.5–14.5)
EST. GFR  (AFRICAN AMERICAN): 12 ML/MIN/1.73 M^2
EST. GFR  (NON AFRICAN AMERICAN): 11 ML/MIN/1.73 M^2
GIANT PLATELETS BLD QL SMEAR: PRESENT
GLUCOSE SERPL-MCNC: 89 MG/DL (ref 70–110)
GLUCOSE UR QL STRIP: NEGATIVE
HCT VFR BLD AUTO: 32.8 % (ref 37–48.5)
HGB BLD-MCNC: 10.6 G/DL (ref 12–16)
HGB UR QL STRIP: NEGATIVE
IRON SERPL-MCNC: 16 UG/DL (ref 30–160)
KETONES UR QL STRIP: NEGATIVE
LEUKOCYTE ESTERASE UR QL STRIP: ABNORMAL
LYMPHOCYTES # BLD AUTO: 1.5 K/UL (ref 1–4.8)
LYMPHOCYTES NFR BLD: 40.6 % (ref 18–48)
MAGNESIUM SERPL-MCNC: 2.7 MG/DL (ref 1.6–2.6)
MCH RBC QN AUTO: 20.2 PG (ref 27–31)
MCHC RBC AUTO-ENTMCNC: 32.3 G/DL (ref 32–36)
MCV RBC AUTO: 62 FL (ref 82–98)
MICROSCOPIC COMMENT: NORMAL
MONOCYTES # BLD AUTO: 0.7 K/UL (ref 0.3–1)
MONOCYTES NFR BLD: 18.1 % (ref 4–15)
NEUTROPHILS # BLD AUTO: 1.4 K/UL (ref 1.8–7.7)
NEUTROPHILS NFR BLD: 39.4 % (ref 38–73)
NITRITE UR QL STRIP: NEGATIVE
PH UR STRIP: 5 [PH] (ref 5–8)
PHOSPHATE SERPL-MCNC: 3.8 MG/DL (ref 2.7–4.5)
PLATELET # BLD AUTO: 218 K/UL (ref 150–350)
PLATELET BLD QL SMEAR: ABNORMAL
PMV BLD AUTO: ABNORMAL FL (ref 9.2–12.9)
POCT GLUCOSE: 135 MG/DL (ref 70–110)
POCT GLUCOSE: 156 MG/DL (ref 70–110)
POCT GLUCOSE: 161 MG/DL (ref 70–110)
POCT GLUCOSE: 181 MG/DL (ref 70–110)
POCT GLUCOSE: 70 MG/DL (ref 70–110)
POCT GLUCOSE: 89 MG/DL (ref 70–110)
POLYCHROMASIA BLD QL SMEAR: ABNORMAL
POTASSIUM SERPL-SCNC: 3.9 MMOL/L (ref 3.5–5.1)
PROT SERPL-MCNC: 6.4 G/DL (ref 6–8.4)
PROT UR QL STRIP: NEGATIVE
RBC # BLD AUTO: 5.26 M/UL (ref 4–5.4)
SATURATED IRON: 4 % (ref 20–50)
SCHISTOCYTES BLD QL SMEAR: ABNORMAL
SODIUM SERPL-SCNC: 132 MMOL/L (ref 136–145)
SP GR UR STRIP: 1.01 (ref 1–1.03)
SQUAMOUS #/AREA URNS HPF: NORMAL /HPF
TARGETS BLD QL SMEAR: ABNORMAL
TOTAL IRON BINDING CAPACITY: 411 UG/DL (ref 250–450)
TRANSFERRIN SERPL-MCNC: 278 MG/DL (ref 200–375)
URN SPEC COLLECT METH UR: ABNORMAL
UROBILINOGEN UR STRIP-ACNC: NEGATIVE EU/DL
WBC # BLD AUTO: 3.6 K/UL (ref 3.9–12.7)
WBC #/AREA URNS HPF: 1 /HPF (ref 0–5)

## 2019-08-10 PROCEDURE — 99233 SBSQ HOSP IP/OBS HIGH 50: CPT | Mod: ,,, | Performed by: UROLOGY

## 2019-08-10 PROCEDURE — 25000003 PHARM REV CODE 250: Performed by: INTERNAL MEDICINE

## 2019-08-10 PROCEDURE — 84100 ASSAY OF PHOSPHORUS: CPT

## 2019-08-10 PROCEDURE — 36415 COLL VENOUS BLD VENIPUNCTURE: CPT

## 2019-08-10 PROCEDURE — 25000242 PHARM REV CODE 250 ALT 637 W/ HCPCS: Performed by: INTERNAL MEDICINE

## 2019-08-10 PROCEDURE — 85025 COMPLETE CBC W/AUTO DIFF WBC: CPT

## 2019-08-10 PROCEDURE — 83735 ASSAY OF MAGNESIUM: CPT

## 2019-08-10 PROCEDURE — 81000 URINALYSIS NONAUTO W/SCOPE: CPT

## 2019-08-10 PROCEDURE — 80053 COMPREHEN METABOLIC PANEL: CPT

## 2019-08-10 PROCEDURE — 83540 ASSAY OF IRON: CPT

## 2019-08-10 PROCEDURE — 94640 AIRWAY INHALATION TREATMENT: CPT

## 2019-08-10 PROCEDURE — S0171 BUMETANIDE 0.5 MG: HCPCS | Performed by: INTERNAL MEDICINE

## 2019-08-10 PROCEDURE — 99233 PR SUBSEQUENT HOSPITAL CARE,LEVL III: ICD-10-PCS | Mod: ,,, | Performed by: UROLOGY

## 2019-08-10 PROCEDURE — 63600175 PHARM REV CODE 636 W HCPCS: Performed by: INTERNAL MEDICINE

## 2019-08-10 PROCEDURE — 21400001 HC TELEMETRY ROOM

## 2019-08-10 PROCEDURE — 82728 ASSAY OF FERRITIN: CPT

## 2019-08-10 PROCEDURE — 83970 ASSAY OF PARATHORMONE: CPT

## 2019-08-10 RX ORDER — METOLAZONE 5 MG/1
10 TABLET ORAL DAILY
Status: DISCONTINUED | OUTPATIENT
Start: 2019-08-10 | End: 2019-08-11

## 2019-08-10 RX ADMIN — IPRATROPIUM BROMIDE AND ALBUTEROL SULFATE 3 ML: .5; 3 SOLUTION RESPIRATORY (INHALATION) at 06:08

## 2019-08-10 RX ADMIN — BUMETANIDE 2 MG: 0.25 INJECTION INTRAMUSCULAR; INTRAVENOUS at 10:08

## 2019-08-10 RX ADMIN — AMIODARONE HYDROCHLORIDE 200 MG: 200 TABLET ORAL at 10:08

## 2019-08-10 RX ADMIN — Medication 160 MG: at 10:08

## 2019-08-10 RX ADMIN — METOLAZONE 10 MG: 5 TABLET ORAL at 10:08

## 2019-08-10 RX ADMIN — PRAVASTATIN SODIUM 10 MG: 10 TABLET ORAL at 09:08

## 2019-08-10 RX ADMIN — LEVOTHYROXINE SODIUM 50 MCG: 50 TABLET ORAL at 06:08

## 2019-08-10 RX ADMIN — METOPROLOL TARTRATE 12.5 MG: 25 TABLET, FILM COATED ORAL at 09:08

## 2019-08-10 RX ADMIN — ACETAMINOPHEN 650 MG: 325 TABLET, FILM COATED ORAL at 01:08

## 2019-08-10 RX ADMIN — ACETAMINOPHEN 650 MG: 325 TABLET, FILM COATED ORAL at 10:08

## 2019-08-10 RX ADMIN — IPRATROPIUM BROMIDE AND ALBUTEROL SULFATE 3 ML: .5; 3 SOLUTION RESPIRATORY (INHALATION) at 01:08

## 2019-08-10 RX ADMIN — Medication 160 MG: at 09:08

## 2019-08-10 RX ADMIN — METOPROLOL TARTRATE 12.5 MG: 25 TABLET, FILM COATED ORAL at 10:08

## 2019-08-10 RX ADMIN — POLYETHYLENE GLYCOL 3350 17 G: 17 POWDER, FOR SOLUTION ORAL at 10:08

## 2019-08-10 RX ADMIN — BUMETANIDE 2 MG: 0.25 INJECTION INTRAMUSCULAR; INTRAVENOUS at 09:08

## 2019-08-10 RX ADMIN — ENOXAPARIN SODIUM 30 MG: 100 INJECTION SUBCUTANEOUS at 05:08

## 2019-08-10 RX ADMIN — IPRATROPIUM BROMIDE AND ALBUTEROL SULFATE 3 ML: .5; 3 SOLUTION RESPIRATORY (INHALATION) at 07:08

## 2019-08-10 RX ADMIN — CLOPIDOGREL BISULFATE 75 MG: 75 TABLET ORAL at 10:08

## 2019-08-10 NOTE — PLAN OF CARE
Problem: Adult Inpatient Plan of Care  Goal: Plan of Care Review  Outcome: Ongoing (interventions implemented as appropriate)  Patient is currently on Room Air with oxygen saturation of 97%.  No apparent distress noted at present time.  Respiratory treatments will be administered as ordered.  Will continue to monitor .

## 2019-08-10 NOTE — PLAN OF CARE
"To patient's room to discuss patient managing her care at home.      TN Role Explained.  Patient identified by using 2 identifiers:  Name and date of birth    Patient stated that her friend, Dre WILL HELP AT HOME WITH her RECOVERY.       TN reviewed with patient contents of "Couchsur[employer] Packet".      TN name and contact info placed on the communication board    Preferred Pharmacy:  [id number] - [place] - [address] [facility]  [address]  [place] [address]  Phone: [phone] Fax: [phone]    [id number] - [place]  [address] [facility] [address] [facility] [address] [place] [address]  Phone: [phone] Fax: [phone]       08/10/19 1511   Discharge Assessment   Assessment Type Discharge Planning Assessment   Confirmed/corrected address and phone number on facesheet? Yes   Assessment information obtained from? Patient   Expected Length of Stay (days) 3   Communicated expected length of stay with patient/caregiver yes   Prior to hospitilization cognitive status: Alert/Oriented   Prior to hospitalization functional status: Independent   Current cognitive status: Alert/Oriented   Current Functional Status: Independent   Lives With alone   Able to Return to Prior Arrangements yes   Is patient able to care for self after discharge? Unable to determine at this time (comments)   Patient's perception of discharge disposition home or selfcare   Readmission Within the Last 30 Days no previous admission in last 30 days   Patient currently being followed by outpatient case management? No   Patient currently receives any other outside agency services? No   Equipment Currently Used at Home glucometer;nebulizer;walker, rolling   Do you have any problems affording any of your prescribed medications? No   Is the patient taking medications as prescribed? yes   Does the patient have transportation home? Yes   Transportation " Anticipated family or friend will provide   Does the patient receive services at the [facility]? No   Discharge Plan A Home Health   Discharge Plan B Home   DME Needed Upon Discharge  none   Patient/Family in Agreement with Plan yes

## 2019-08-10 NOTE — ASSESSMENT & PLAN NOTE
Hypoglycemic on admission. Hold oral med and insulin for now. SSI low dose with accuchecks. Currently her glucose has been optimal with just ssi. Hypoglycemic protocol in place. Continue asa and statin.

## 2019-08-10 NOTE — SUBJECTIVE & OBJECTIVE
Past Medical History:   Diagnosis Date    AICD (automatic cardioverter/defibrillator) present     Ambulates with cane     Asthma     CKD (chronic kidney disease), stage IV     Congestive heart failure     COPD (chronic obstructive pulmonary disease)     Diabetes mellitus     Type II    History of ventricular fibrillation     with defibrillation, AICD placed in 2016    Hyperlipidemia     Pacemaker     Pedal edema     Unsteady gait        Past Surgical History:   Procedure Laterality Date    Cardiac bypass  1994    CARDIAC PACEMAKER PLACEMENT Left 2016    AICD       Review of patient's allergies indicates:   Allergen Reactions    Peanut Shortness Of Breath       No current facility-administered medications on file prior to encounter.      Current Outpatient Medications on File Prior to Encounter   Medication Sig    albuterol (PROVENTIL/VENTOLIN HFA) 90 mcg/actuation inhaler Inhale 1-2 puffs into the lungs every 6 (six) hours as needed for Wheezing. Rescue    albuterol-ipratropium (DUO-NEB) 2.5 mg-0.5 mg/3 mL nebulizer solution Take 3 mLs by nebulization every 6 (six) hours while awake. Rescue    albuterol-ipratropium (DUO-NEB) 2.5 mg-0.5 mg/3 mL nebulizer solution Take 3 mLs by nebulization every 6 (six) hours as needed for Wheezing. Rescue    amiodarone (PACERONE) 200 MG Tab TAKE 1 TABLET BY MOUTH IN THE MORNING AND A HALF TABLET BY MOUTH IN THE EVENING    carvedilol (COREG) 6.25 MG tablet TK 1 T PO  BID    clopidogrel (PLAVIX) 75 mg tablet Take 75 mg by mouth once daily.    cyclobenzaprine (FLEXERIL) 10 MG tablet Take 15 mg by mouth 2 (two) times daily.    fluticasone propionate (FLONASE) 50 mcg/actuation nasal spray 2 sprays (100 mcg total) by Each Nare route once daily.    furosemide (LASIX) 80 MG tablet Take 80 mg by mouth 2 (two) times daily.    levothyroxine (SYNTHROID) 50 MCG tablet Take 50 mcg by mouth once daily.    loratadine (CLARITIN) 10 mg tablet Take 1 tablet (10 mg total) by  mouth once daily.    potassium chloride SA (K-DUR,KLOR-CON) 10 MEQ tablet Take 10 mEq by mouth every other day.    pravastatin (PRAVACHOL) 10 MG tablet TK 1 T PO QHS    tiZANidine (ZANAFLEX) 4 MG tablet Take 4 mg by mouth 2 (two) times daily.     Family History     Problem Relation (Age of Onset)    Breast cancer Mother (50)    Cancer Mother    Diabetes Brother, Sister    Heart disease Father, Mother        Tobacco Use    Smoking status: Former Smoker    Smokeless tobacco: Never Used   Substance and Sexual Activity    Alcohol use: No    Drug use: No    Sexual activity: Never     Partners: Male     Review of Systems   Constitutional: Negative for chills, diaphoresis, fatigue and fever.   HENT: Negative.    Eyes: Negative.    Respiratory: Positive for shortness of breath and wheezing. Negative for apnea, cough, choking, chest tightness and stridor.    Cardiovascular: Positive for leg swelling. Negative for chest pain and palpitations.   Gastrointestinal: Negative.    Genitourinary: Negative.    Musculoskeletal: Negative.    Skin: Negative for color change, pallor, rash and wound.   Hematological: Negative.    Psychiatric/Behavioral: Negative.      Objective:     Vital Signs (Most Recent):  Temp: 97.7 °F (36.5 °C) (08/10/19 1114)  Pulse: 62 (08/10/19 1114)  Resp: 19 (08/10/19 1114)  BP: 124/75 (08/10/19 1114)  SpO2: 100 % (08/10/19 1114) Vital Signs (24h Range):  Temp:  [97.6 °F (36.4 °C)-98.5 °F (36.9 °C)] 97.7 °F (36.5 °C)  Pulse:  [62-72] 62  Resp:  [18-26] 19  SpO2:  [91 %-100 %] 100 %  BP: (111-124)/(56-78) 124/75     Weight: 96 kg (211 lb 10.3 oz)  Body mass index is 38.71 kg/m².    Physical Exam   Constitutional: She is oriented to person, place, and time. She appears well-developed and well-nourished. No distress.   HENT:   Head: Normocephalic and atraumatic.   Right Ear: External ear normal.   Left Ear: External ear normal.   Nose: Nose normal.   Mouth/Throat: Oropharynx is clear and moist.   Eyes:  Conjunctivae and EOM are normal. Right eye exhibits no discharge. Left eye exhibits no discharge. No scleral icterus.   Neck: Neck supple. No tracheal deviation present. No thyromegaly present.   Cardiovascular: Normal rate and regular rhythm. PMI is not displaced.   Pulmonary/Chest: Effort normal. No respiratory distress. She exhibits no tenderness.   Abdominal: Soft. She exhibits no distension. There is no tenderness. There is no CVA tenderness. No hernia.   Musculoskeletal: She exhibits no edema.   Neurological: She is alert and oriented to person, place, and time.   Skin: Skin is warm and dry. No rash noted. No erythema.   Psychiatric: She has a normal mood and affect. Her speech is normal and behavior is normal. Judgment and thought content normal. Her mood appears not anxious. Cognition and memory are normal.       Significant Labs: All pertinent labs within the past 24 hours have been reviewed.    Significant Imaging:   Renal US: Images reviewed. Possible mass in mid-pole right kidney, up to 3.8cm.

## 2019-08-10 NOTE — ASSESSMENT & PLAN NOTE
Baseline Cr 3. On admission 4.2.   Nephrology consulted  As above  Renally dose meds, and avoid nephrotoxic meds as able  Urology consulted by nephro for possible renal mass - low suspicion for renal mass at this time, can get repeat u/s at [facility]

## 2019-08-10 NOTE — CONSULTS
[facility]  Urology  Consult Note    Patient Name: Charu Oswald  MRN: [id number]  Admission Date: 8/9/2019  Hospital Length of Stay: 1   Code Status: Full Code   Attending Provider: Nitin Fernandez MD   Consulting Provider: Tahir Araujo MD  Primary Care Physician: Gerda Carter MD  Principal Problem:Acute on chronic combined systolic and diastolic congestive heart failure    Inpatient consult to Urology  Consult performed by: Tahir Araujo MD  Consult ordered by: Loraine Oliveira MD          Subjective:     HPI:  Ms. Oswald is a 63-year-old female with history of CKD4, not on HD, and congestive heart failure who presented with weakness and bradycardia and was also noted to have further decreased renal function.  She had a renal ultrasound done for evaluation of this which demonstrated possible renal mass.  She is a poor historian.  She denies any prior diagnosis or evaluation for renal mass.    Past Medical History:   Diagnosis Date    AICD (automatic cardioverter/defibrillator) present     Ambulates with cane     Asthma     CKD (chronic kidney disease), stage IV     Congestive heart failure     COPD (chronic obstructive pulmonary disease)     Diabetes mellitus     Type II    History of ventricular fibrillation     with defibrillation, AICD placed in 2016    Hyperlipidemia     Pacemaker     Pedal edema     Unsteady gait        Past Surgical History:   Procedure Laterality Date    Cardiac bypass  1994    CARDIAC PACEMAKER PLACEMENT Left 2016    AICD       Review of patient's allergies indicates:   Allergen Reactions    Peanut Shortness Of Breath       No current facility-administered medications on file prior to encounter.      Current Outpatient Medications on File Prior to Encounter   Medication Sig    albuterol (PROVENTIL/VENTOLIN HFA) 90 mcg/actuation inhaler Inhale 1-2 puffs into the lungs every 6 (six) hours as needed for Wheezing. Rescue    albuterol-ipratropium  (DUO-NEB) 2.5 mg-0.5 mg/3 mL nebulizer solution Take 3 mLs by nebulization every 6 (six) hours while awake. Rescue    albuterol-ipratropium (DUO-NEB) 2.5 mg-0.5 mg/3 mL nebulizer solution Take 3 mLs by nebulization every 6 (six) hours as needed for Wheezing. Rescue    amiodarone (PACERONE) 200 MG Tab TAKE 1 TABLET BY MOUTH IN THE MORNING AND A HALF TABLET BY MOUTH IN THE EVENING    carvedilol (COREG) 6.25 MG tablet TK 1 T PO  BID    clopidogrel (PLAVIX) 75 mg tablet Take 75 mg by mouth once daily.    cyclobenzaprine (FLEXERIL) 10 MG tablet Take 15 mg by mouth 2 (two) times daily.    fluticasone propionate (FLONASE) 50 mcg/actuation nasal spray 2 sprays (100 mcg total) by Each Nare route once daily.    furosemide (LASIX) 80 MG tablet Take 80 mg by mouth 2 (two) times daily.    levothyroxine (SYNTHROID) 50 MCG tablet Take 50 mcg by mouth once daily.    loratadine (CLARITIN) 10 mg tablet Take 1 tablet (10 mg total) by mouth once daily.    potassium chloride SA (K-DUR,KLOR-CON) 10 MEQ tablet Take 10 mEq by mouth every other day.    pravastatin (PRAVACHOL) 10 MG tablet TK 1 T PO QHS    tiZANidine (ZANAFLEX) 4 MG tablet Take 4 mg by mouth 2 (two) times daily.     Family History     Problem Relation (Age of Onset)    Breast cancer Mother (50)    Cancer Mother    Diabetes Brother, Sister    Heart disease Father, Mother        Tobacco Use    Smoking status: Former Smoker    Smokeless tobacco: Never Used   Substance and Sexual Activity    Alcohol use: No    Drug use: No    Sexual activity: Never     Partners: Male     Review of Systems   Constitutional: Negative for chills, diaphoresis, fatigue and fever.   HENT: Negative.    Eyes: Negative.    Respiratory: Positive for shortness of breath and wheezing. Negative for apnea, cough, choking, chest tightness and stridor.    Cardiovascular: Positive for leg swelling. Negative for chest pain and palpitations.   Gastrointestinal: Negative.    Genitourinary:  Negative.    Musculoskeletal: Negative.    Skin: Negative for color change, pallor, rash and wound.   Hematological: Negative.    Psychiatric/Behavioral: Negative.      Objective:     Vital Signs (Most Recent):  Temp: 97.7 °F (36.5 °C) (08/10/19 1114)  Pulse: 62 (08/10/19 1114)  Resp: 19 (08/10/19 1114)  BP: 124/75 (08/10/19 1114)  SpO2: 100 % (08/10/19 1114) Vital Signs (24h Range):  Temp:  [97.6 °F (36.4 °C)-98.5 °F (36.9 °C)] 97.7 °F (36.5 °C)  Pulse:  [62-72] 62  Resp:  [18-26] 19  SpO2:  [91 %-100 %] 100 %  BP: (111-124)/(56-78) 124/75     Weight: 96 kg (211 lb 10.3 oz)  Body mass index is 38.71 kg/m².    Physical Exam   Constitutional: She is oriented to person, place, and time. She appears well-developed and well-nourished. No distress.   HENT:   Head: Normocephalic and atraumatic.   Right Ear: External ear normal.   Left Ear: External ear normal.   Nose: Nose normal.   Mouth/Throat: Oropharynx is clear and moist.   Eyes: Conjunctivae and EOM are normal. Right eye exhibits no discharge. Left eye exhibits no discharge. No scleral icterus.   Neck: Neck supple. No tracheal deviation present. No thyromegaly present.   Cardiovascular: Normal rate and regular rhythm. PMI is not displaced.   Pulmonary/Chest: Effort normal. No respiratory distress. She exhibits no tenderness.   Abdominal: Soft. She exhibits no distension. There is no tenderness. There is no CVA tenderness. No hernia.   Musculoskeletal: She exhibits no edema.   Neurological: She is alert and oriented to person, place, and time.   Skin: Skin is warm and dry. No rash noted. No erythema.   Psychiatric: She has a normal mood and affect. Her speech is normal and behavior is normal. Judgment and thought content normal. Her mood appears not anxious. Cognition and memory are normal.       Significant Labs: All pertinent labs within the past 24 hours have been reviewed.    Significant Imaging:   Renal US: Images reviewed. Possible mass in mid-pole right  kidney, up to 3.8cm.           Assessment and Plan:     Abnormal renal ultrasound  Possible mass not well imaged with US and may represent artifact and not tumor. Renal US report reviewed from July 2019 at [facility] with no mass identified. The lesion is also not noted on non-contrast CT here earlier this year, therefore I'm doubtful this is a true finding.    She would need contrasted CT scan or MRI to provide further evaluation, neither of which are an option at this time due to her kidney function.  Furthermore, any treatment for a renal mass in this patient would almost certainly result in her needing permanent dialysis.    Given above, do not recommend any additional evaluation or treatment at this time.  She can have a repeat ultrasound in a few months, which I recommend she have done at [facility] is this is where she receives most of her care.  May also be able to consider a CT scan with contrast in the future if she does start dialysis.        VTE Risk Mitigation (From admission, onward)        Ordered     enoxaparin injection 30 mg  Daily      08/09/19 1710     IP VTE HIGH RISK PATIENT  Once      08/09/19 1705     Place sequential compression device  Until discontinued      08/09/19 1636          Thank you for your consult. I will sign off. Please contact us if you have any additional questions.    Tahir Araujo MD  Urology  [facility]

## 2019-08-10 NOTE — SUBJECTIVE & OBJECTIVE
Interval History: No acute events overnight. No new complaints. Afebrile and HDS. Feels depressed from all her medical issues, tearful during conversation.     Review of Systems   Constitutional: Negative for chills, diaphoresis, fatigue and fever.   HENT: Negative.    Eyes: Negative.    Respiratory: Positive for shortness of breath and wheezing. Negative for apnea, cough, choking, chest tightness and stridor.    Cardiovascular: Positive for leg swelling. Negative for chest pain and palpitations.   Gastrointestinal: Negative.    Genitourinary: Negative.    Musculoskeletal: Negative.    Skin: Negative for color change, pallor, rash and wound.   Hematological: Negative.    Psychiatric/Behavioral: Negative.      Objective:     Vital Signs (Most Recent):  Temp: 98.2 °F (36.8 °C) (08/10/19 1509)  Pulse: 61 (08/10/19 1509)  Resp: 19 (08/10/19 1509)  BP: 119/67 (08/10/19 1509)  SpO2: (!) 93 % (08/10/19 1509) Vital Signs (24h Range):  Temp:  [97.6 °F (36.4 °C)-98.5 °F (36.9 °C)] 98.2 °F (36.8 °C)  Pulse:  [61-72] 61  Resp:  [18-20] 19  SpO2:  [91 %-100 %] 93 %  BP: (111-124)/(56-75) 119/67     Weight: 96 kg (211 lb 10.3 oz)  Body mass index is 38.71 kg/m².    Physical Exam   Constitutional: She is oriented to person, place, and time. She appears well-developed and well-nourished. No distress.   HENT:   Head: Normocephalic and atraumatic.   Mouth/Throat: No oropharyngeal exudate.   Eyes: Pupils are equal, round, and reactive to light. EOM are normal. No scleral icterus.   Neck: Normal range of motion. Neck supple. JVD present.   Pulmonary/Chest: Effort normal. She has wheezes. She has rales.   Decreased bibasilar BS. Bilateral wheeze.   Abdominal: Soft. Bowel sounds are normal. She exhibits no distension. There is no tenderness.   Musculoskeletal: Normal range of motion. She exhibits edema (+3 bilateral LE edema).   Neurological: She is alert and oriented to person, place, and time.   Skin: Skin is warm and dry. Capillary  refill takes less than 2 seconds. No rash noted. She is not diaphoretic. No erythema. No pallor.   Psychiatric: She has a normal mood and affect. Her behavior is normal. Judgment and thought content normal.   Nursing note and vitals reviewed.       Significant Labs: All pertinent labs within the past 24 hours have been reviewed.    Significant Imaging: I have reviewed and interpreted all pertinent imaging results/findings within the past 24 hours.

## 2019-08-10 NOTE — HOSPITAL COURSE
Admitted to  for acute on chronic CHF and RIKA on CKD. Cardiology and nephology consulted. Started on IV diuretics with minimal output. Cardiology deferred diuretics to nephrology. Switch coreg to metoprolol due to bp issues. Nephrology added metolazone, with minimal OP. She doesn't not require emergent dialysis at this time. Cr stable 4.2 entire time. Likely her new baseline. Nephrology rec continuing Bumex 2 mg bid with alternative metolazone 10 mg on discharge. Patient stable for d/c today with OP nephrology fu. She is approaching dialysis at this point.

## 2019-08-10 NOTE — NURSING
Bedside rounding report received from addy wilkerson rn on patients progress and updated handoff report sheet . Assessment done reviewed plan of care today with patient and her spouse . Call light in reach head of bed up bed alarm on call light in reach. Rails up x 3.

## 2019-08-10 NOTE — PLAN OF CARE
Problem: Adult Inpatient Plan of Care  Goal: Plan of Care Review  Outcome: Ongoing (interventions implemented as appropriate)     08/10/19 7340   Plan of Care Review   Plan of Care Reviewed With patient   Progress improving

## 2019-08-10 NOTE — CONSULTS
Date of Admit: 8/9/2019  Length of Stay: 1   Days  Consult MD: MD Jim    Date of Consult: 8/10/2019    Reason for Consultation     Leonides, chf    Subjective:      History of Present Illness:  Charu Oswald is a 63 y.o. year old female with a past medical history significant for ckd 4 who presented with concerns of worsening renal function. She has ckd 4 and sees Dr. Abdi at [facility]. She is to see him on the 26th.  She notes her nephrologist told her she is close to hd. Pt has been to [facility] for chf/copd multiple times this year. Pt notes not following low salt diet. Pt notes eating canned and fried food. Pt notes taking nsaids/ppi. Pt notes edema. Pt notes no fluid restriction. Pt presented from her primary for weakness and bradycardia. We have seen this pt in the past for leonides- last seen by us in 2016.      Past Medical History:  Past Medical History:   Diagnosis Date    AICD (automatic cardioverter/defibrillator) present     Ambulates with cane     Asthma     CKD (chronic kidney disease), stage IV     Congestive heart failure     COPD (chronic obstructive pulmonary disease)     Diabetes mellitus     Type II    History of ventricular fibrillation     with defibrillation, AICD placed in 2016    Hyperlipidemia     Pacemaker     Pedal edema     Unsteady gait        Past Surgical History:  Past Surgical History:   Procedure Laterality Date    Cardiac bypass  1994    CARDIAC PACEMAKER PLACEMENT Left 2016    AICD       Allergies:  Review of patient's allergies indicates:   Allergen Reactions    Peanut Shortness Of Breath       Home Medications:    No current facility-administered medications on file prior to encounter.      Current Outpatient Medications on File Prior to Encounter   Medication Sig Dispense Refill    albuterol (PROVENTIL/VENTOLIN HFA) 90 mcg/actuation inhaler Inhale 1-2 puffs into the lungs every 6 (six) hours as needed for Wheezing. Rescue 1 Inhaler 2    albuterol-ipratropium (DUO-NEB)  2.5 mg-0.5 mg/3 mL nebulizer solution Take 3 mLs by nebulization every 6 (six) hours while awake. Rescue 1 Box 0    albuterol-ipratropium (DUO-NEB) 2.5 mg-0.5 mg/3 mL nebulizer solution Take 3 mLs by nebulization every 6 (six) hours as needed for Wheezing. Rescue 1 Box 0    amiodarone (PACERONE) 200 MG Tab TAKE 1 TABLET BY MOUTH IN THE MORNING AND A HALF TABLET BY MOUTH IN THE EVENING  5    carvedilol (COREG) 6.25 MG tablet TK 1 T PO  BID  6    clopidogrel (PLAVIX) 75 mg tablet Take 75 mg by mouth once daily.  6    cyclobenzaprine (FLEXERIL) 10 MG tablet Take 15 mg by mouth 2 (two) times daily.      fluticasone propionate (FLONASE) 50 mcg/actuation nasal spray 2 sprays (100 mcg total) by Each Nare route once daily. 16 g 0    furosemide (LASIX) 80 MG tablet Take 80 mg by mouth 2 (two) times daily.      levothyroxine (SYNTHROID) 50 MCG tablet Take 50 mcg by mouth once daily.  6    loratadine (CLARITIN) 10 mg tablet Take 1 tablet (10 mg total) by mouth once daily. 60 tablet 0    potassium chloride SA (K-DUR,KLOR-CON) 10 MEQ tablet Take 10 mEq by mouth every other day.  2    pravastatin (PRAVACHOL) 10 MG tablet TK 1 T PO QHS  4    tiZANidine (ZANAFLEX) 4 MG tablet Take 4 mg by mouth 2 (two) times daily.         Family History:  Family History   Problem Relation Age of Onset    Heart disease Father     Heart disease Mother     Cancer Mother         Bone and breast    Breast cancer Mother 50    Diabetes Brother     Diabetes Sister      aunt with esrd    Social History:  Social History     Tobacco Use    Smoking status: Former Smoker    Smokeless tobacco: Never Used   Substance Use Topics    Alcohol use: No    Drug use: No       Review of Systems:    10pt ros: sob, edema, weakness, rales, wheezing    Objective:     Scheduled Meds:   albuterol-ipratropium  3 mL Nebulization Q6H WAKE    amiodarone  200 mg Oral Daily    bumetanide  2 mg Intravenous Q12H    clopidogrel  75 mg Oral Daily    enoxaparin  " 30 mg Subcutaneous Daily    ferrous sulfate, dried  160 mg Oral Daily    levothyroxine  50 mcg Oral Before breakfast    metOLazone  10 mg Oral Daily    metoprolol tartrate  12.5 mg Oral BID    polyethylene glycol  17 g Oral Daily    pravastatin  10 mg Oral QHS     Continuous Infusions:  PRN Meds:.acetaminophen, albuterol-ipratropium, bisacodyl, dextrose 50%, dextrose 50%, glucagon (human recombinant), glucose, glucose, insulin aspart U-100, ondansetron, promethazine (PHENERGAN) IVPB, sodium chloride 0.9%      Physical Examination:    Vitals: BP (!) 122/57 (BP Location: Right arm, Patient Position: Lying)   Pulse 64   Temp 98.5 °F (36.9 °C) (Oral)   Resp 19   Ht 5' 2" (1.575 m)   Wt 96 kg (211 lb 10.3 oz)   SpO2 100%   Breastfeeding? No   BMI 38.71 kg/m²    I/O last 3 completed shifts:  In: 240 [P.O.:240]  Out: 650 [Urine:650]  No intake/output data recorded.      General: No acute distress   Head: Normocephalic, atraumatic  Eyes: No jaundice  Neck: Supple,  Cardiovascular:s1s2 regular  Chest: no rales  Abdomen: Soft, nondistended, nontender  Extremities: 3+leg edema  Neurologic: AOx3    Laboratory:  Recent Results (from the past 24 hour(s))   CBC auto differential    Collection Time: 08/09/19 12:50 PM   Result Value Ref Range    WBC 3.77 (L) 3.90 - 12.70 K/uL    RBC 5.55 (H) 4.00 - 5.40 M/uL    Hemoglobin 11.2 (L) 12.0 - 16.0 g/dL    Hematocrit 34.9 (L) 37.0 - 48.5 %    Mean Corpuscular Volume 63 (L) 82 - 98 fL    Mean Corpuscular Hemoglobin 20.2 (L) 27.0 - 31.0 pg    Mean Corpuscular Hemoglobin Conc 32.1 32.0 - 36.0 g/dL    RDW 18.7 (H) 11.5 - 14.5 %    Platelets 253 150 - 350 K/uL    MPV SEE COMMENT 9.2 - 12.9 fL    Gran # (ANC) 1.4 (L) 1.8 - 7.7 K/uL    Lymph # 1.8 1.0 - 4.8 K/uL    Mono # 0.5 0.3 - 1.0 K/uL    Eos # 0.1 0.0 - 0.5 K/uL    Baso # 0.02 0.00 - 0.20 K/uL    Gran% 36.7 (L) 38.0 - 73.0 %    Lymph% 48.5 (H) 18.0 - 48.0 %    Mono% 12.7 4.0 - 15.0 %    Eosinophil% 2.1 0.0 - 8.0 %    " Basophil% 0.5 0.0 - 1.9 %    Differential Method Automated    Comprehensive metabolic panel    Collection Time: 08/09/19 12:50 PM   Result Value Ref Range    Sodium 133 (L) 136 - 145 mmol/L    Potassium 3.7 3.5 - 5.1 mmol/L    Chloride 88 (L) 95 - 110 mmol/L    CO2 32 (H) 23 - 29 mmol/L    Glucose 51 (L) 70 - 110 mg/dL    BUN, Bld 45 (H) 8 - 23 mg/dL    Creatinine 4.2 (H) 0.5 - 1.4 mg/dL    Calcium 9.3 8.7 - 10.5 mg/dL    Total Protein 7.0 6.0 - 8.4 g/dL    Albumin 3.7 3.5 - 5.2 g/dL    Total Bilirubin 0.6 0.1 - 1.0 mg/dL    Alkaline Phosphatase 233 (H) 55 - 135 U/L    AST 22 10 - 40 U/L    ALT 12 10 - 44 U/L    Anion Gap 13 8 - 16 mmol/L    eGFR if African American 12 (A) >60 mL/min/1.73 m^2    eGFR if non African American 11 (A) >60 mL/min/1.73 m^2   TSH    Collection Time: 08/09/19 12:50 PM   Result Value Ref Range    TSH 1.967 0.400 - 4.000 uIU/mL   Troponin I    Collection Time: 08/09/19 12:50 PM   Result Value Ref Range    Troponin I 0.018 0.000 - 0.026 ng/mL   Brain natriuretic peptide    Collection Time: 08/09/19 12:50 PM   Result Value Ref Range    BNP 3,096 (H) 0 - 99 pg/mL   CPK    Collection Time: 08/09/19 12:50 PM   Result Value Ref Range    CPK 85 20 - 180 U/L   Magnesium    Collection Time: 08/09/19 12:50 PM   Result Value Ref Range    Magnesium 2.8 (H) 1.6 - 2.6 mg/dL   Phosphorus    Collection Time: 08/09/19 12:50 PM   Result Value Ref Range    Phosphorus 3.6 2.7 - 4.5 mg/dL   CK    Collection Time: 08/09/19 12:50 PM   Result Value Ref Range    CPK 81 20 - 180 U/L   POCT glucose    Collection Time: 08/09/19  2:06 PM   Result Value Ref Range    POCT Glucose 158 (H) 70 - 110 mg/dL   Urinalysis, Reflex to Urine Culture Urine, Clean Catch    Collection Time: 08/09/19  2:25 PM   Result Value Ref Range    Specimen UA Urine, Clean Catch     Color, UA Yellow Yellow, Straw, Saira    Appearance, UA Clear Clear    pH, UA 5.0 5.0 - 8.0    Specific Gravity, UA 1.010 1.005 - 1.030    Protein, UA Negative Negative     Glucose, UA Negative Negative    Ketones, UA Negative Negative    Bilirubin (UA) Negative Negative    Occult Blood UA Negative Negative    Nitrite, UA Negative Negative    Urobilinogen, UA Negative <2.0 EU/dL    Leukocytes, UA Trace (A) Negative   Creatinine, urine, random    Collection Time: 08/09/19  2:25 PM   Result Value Ref Range    Creatinine, Random Ur 97.7 15.0 - 325.0 mg/dL   Sodium, urine, random    Collection Time: 08/09/19  2:25 PM   Result Value Ref Range    Sodium Urine Random <20 (A) 20 - 250 mmol/L   Urinalysis Microscopic    Collection Time: 08/09/19  2:25 PM   Result Value Ref Range    WBC, UA 0 0 - 5 /hpf    Microscopic Comment SEE COMMENT    POCT glucose    Collection Time: 08/09/19  4:27 PM   Result Value Ref Range    POCT Glucose 181 (H) 70 - 110 mg/dL   ISTAT PROCEDURE    Collection Time: 08/09/19  6:11 PM   Result Value Ref Range    POC PH 7.455 (H) 7.35 - 7.45    POC PCO2 43.5 35 - 45 mmHg    POC PO2 61 (L) 80 - 100 mmHg    POC HCO3 30.5 (H) 24 - 28 mmol/L    POC BE 6 -2 to 2 mmol/L    POC SATURATED O2 92 (L) 95 - 100 %    POC TCO2 32 (H) 23 - 27 mmol/L    Sample ARTERIAL     Site RR     Allens Test Pass     DelSys Nasal Can     Mode SPONT    POCT glucose    Collection Time: 08/09/19  8:30 PM   Result Value Ref Range    POCT Glucose 70 70 - 110 mg/dL   Comprehensive Metabolic Panel (CMP)    Collection Time: 08/10/19  5:15 AM   Result Value Ref Range    Sodium 132 (L) 136 - 145 mmol/L    Potassium 3.9 3.5 - 5.1 mmol/L    Chloride 88 (L) 95 - 110 mmol/L    CO2 29 23 - 29 mmol/L    Glucose 89 70 - 110 mg/dL    BUN, Bld 47 (H) 8 - 23 mg/dL    Creatinine 4.2 (H) 0.5 - 1.4 mg/dL    Calcium 9.3 8.7 - 10.5 mg/dL    Total Protein 6.4 6.0 - 8.4 g/dL    Albumin 3.5 3.5 - 5.2 g/dL    Total Bilirubin 0.8 0.1 - 1.0 mg/dL    Alkaline Phosphatase 270 (H) 55 - 135 U/L    AST 23 10 - 40 U/L    ALT 12 10 - 44 U/L    Anion Gap 15 8 - 16 mmol/L    eGFR if African American 12 (A) >60 mL/min/1.73 m^2    eGFR if  non  11 (A) >60 mL/min/1.73 m^2   CBC with Automated Differential    Collection Time: 08/10/19  5:15 AM   Result Value Ref Range    WBC 3.60 (L) 3.90 - 12.70 K/uL    RBC 5.26 4.00 - 5.40 M/uL    Hemoglobin 10.6 (L) 12.0 - 16.0 g/dL    Hematocrit 32.8 (L) 37.0 - 48.5 %    Mean Corpuscular Volume 62 (L) 82 - 98 fL    Mean Corpuscular Hemoglobin 20.2 (L) 27.0 - 31.0 pg    Mean Corpuscular Hemoglobin Conc 32.3 32.0 - 36.0 g/dL    RDW 18.5 (H) 11.5 - 14.5 %    Platelets 218 150 - 350 K/uL    MPV SEE COMMENT 9.2 - 12.9 fL    Gran # (ANC) 1.4 (L) 1.8 - 7.7 K/uL    Lymph # 1.5 1.0 - 4.8 K/uL    Mono # 0.7 0.3 - 1.0 K/uL    Eos # 0.1 0.0 - 0.5 K/uL    Baso # 0.00 0.00 - 0.20 K/uL    Gran% 39.4 38.0 - 73.0 %    Lymph% 40.6 18.0 - 48.0 %    Mono% 18.1 (H) 4.0 - 15.0 %    Eosinophil% 2.2 0.0 - 8.0 %    Basophil% 0.0 0.0 - 1.9 %    Platelet Estimate Appears normal     Aniso Slight     Poly Occasional     Target Cells Moderate     Tear Drop Cells Moderate     [place] Cells Occasional     Large/Giant Platelets Present     Fragmented Cells Occasional     Differential Method Automated    Magnesium    Collection Time: 08/10/19  5:15 AM   Result Value Ref Range    Magnesium 2.7 (H) 1.6 - 2.6 mg/dL   Iron and TIBC    Collection Time: 08/10/19  5:15 AM   Result Value Ref Range    Iron 16 (L) 30 - 160 ug/dL    Transferrin 278 200 - 375 mg/dL    TIBC 411 250 - 450 ug/dL    Saturated Iron 4 (L) 20 - 50 %   Phosphorus    Collection Time: 08/10/19  5:15 AM   Result Value Ref Range    Phosphorus 3.8 2.7 - 4.5 mg/dL   POCT glucose    Collection Time: 08/10/19  7:39 AM   Result Value Ref Range    POCT Glucose 89 70 - 110 mg/dL           Diagnostic Tests:     US RETROPERITONEAL COMPLETE    CLINICAL HISTORY:  kidney injury; Unspecified injury of unspecified kidney, initial encounter    TECHNIQUE:  Ultrasound of the kidneys and urinary bladder was performed including color flow and Doppler evaluation of the kidneys.    COMPARISON:  CT  abdomen pelvis 05/24/2019    FINDINGS:  Right kidney: The right kidney measures 11.3 cm. No cortical thinning. No loss of corticomedullary distinction. Resistive index measures 0.79.  Hypoechoic mass in the midpole measuring 3.8 x 2.4 x 3.1 cm.  No renal stone. No hydronephrosis.    Left kidney: The left kidney measures 9.5 cm. No cortical thinning. No loss of corticomedullary distinction. Resistive index measures 0.74. No renal stone. No hydronephrosis.    The bladder is partially distended at the time of scanning and has an unremarkable appearance.   Impression:       Right renal indeterminate mass.  Recommend CT or MRI renal mass protocol for further characterization.    Elevated resistive indices, suggesting medical renal disease.    This report was flagged in Epic as abnormal.              Assessment:     Charu Oswald is a 63 y.o. female admitted with:  kirill on ckd 4  Acute on combined hf  Iron def anemia  htn  Copd  Renal mass       Plan:   1.consult dietician for education of diet  2.recommend 1l fluid restriction  3.add metolazone  4.cont po iron.  5.ck ua.  6.no ace/arb for now.  7.consult urology. Cannot get mri with aicd.   No mass on us from 2016.      Loraine Oliveira

## 2019-08-10 NOTE — PLAN OF CARE
Problem: Fall Injury Risk  Goal: Absence of Fall and Fall-Related Injury  Outcome: Ongoing (interventions implemented as appropriate)  Intervention: Identify and Manage Contributors to Fall Injury Risk     08/10/19 0721   Manage Acute Allergic Reaction   Medication Review/Management medications reviewed   Identify and Manage Contributors to Fall Injury Risk   Self-Care Promotion independence encouraged;BADL personal routines maintained

## 2019-08-10 NOTE — ASSESSMENT & PLAN NOTE
Presenting with prog worsening dyspnea with LE swelling, weight gain, elevated probnp and CXR noting vascular congestion. Admit to telemetry. Troponins negative and EKG wo any new changes.  - Nephrology and cardiology consulted, appreciate recs  - Continue IV bumex 2 mg, added metolazone   - Cont BB (changed coreg to metoprolol due to low bp)  - Fluid restriction, Low sodium cardiac Diet  - Daily standing weight and strict ios - minimal UOP  - Optimize electrolytes (K>4 & Mg > 2)  - Daily LABS - cbc, bmp, and mg

## 2019-08-10 NOTE — HPI
Ms. Oswald is a 63-year-old female with history of CKD4, not on HD, and congestive heart failure who presented with weakness and bradycardia and was also noted to have further decreased renal function.  She had a renal ultrasound done for evaluation of this which demonstrated possible renal mass.  She is a poor historian.  She denies any prior diagnosis or evaluation for renal mass.

## 2019-08-10 NOTE — ASSESSMENT & PLAN NOTE
Possible mass not well imaged with US and may represent artifact and not tumor. Renal US report reviewed from July 2019 at [facility] with no mass identified. The lesion is also not noted on non-contrast CT here earlier this year, therefore I'm doubtful this is a true finding.    She would need contrasted CT scan or MRI to provide further evaluation, neither of which are an option at this time due to her kidney function.  Furthermore, any treatment for a renal mass in this patient would almost certainly result in her needing permanent dialysis.    Given above, do not recommend any additional evaluation or treatment at this time.  She can have a repeat ultrasound in a few months, which I recommend she have done at [facility] is this is where she receives most of her care.  May also be able to consider a CT scan with contrast in the future if she does start dialysis.

## 2019-08-10 NOTE — PLAN OF CARE
Problem: Adult Inpatient Plan of Care  Goal: Plan of Care Review  Outcome: Ongoing (interventions implemented as appropriate)  Pt remains on RA sats 97%. SVN tx given as ordered Q6 with Duoneb. Will continue to monitor.

## 2019-08-10 NOTE — CONSULTS
Consult received for low Na diet education. RD covering remotely for weekend coverage. Will have inpatient RD follow up with pt for education.

## 2019-08-10 NOTE — PROGRESS NOTES
[facility] Medicine  Progress Note    Patient Name: Charu Oswald  MRN: [id number]  Patient Class: IP- Inpatient   Admission Date: 8/9/2019  Length of Stay: 1 days  Attending Physician: Nitin Fernandez MD  Primary Care Provider: Gerda Carter MD      Subjective:     Principal Problem:Acute on chronic combined systolic and diastolic congestive heart failure      HPI:  62 yo AA female with hx of Ischemic cardiomyopathy with EF 25% with ICD, LLLB, COPD, tobacco abuse, hypertension, hyperlipidemia, CKD stage 4, severe PVD, Hypothyroidism, presenting with progressively worsening dyspnea for the past several days with associated worsening LE swelling and orthopnea. Denies any fevers, chills, syncope, productive cough, cp, palpitation, PND, abdominal pain, nausea, emesis, dysuria, bleeds. Has had several admissions in the past for CHF exacerbation, most recently few weeks back to [facility]. She states to be complaint with fluid restriction and salt restriction at home. Does not check weight daily.     In the ED patient was hemodynamically stable, afebrile, saturating well on RA. Labs significant BNP ~ 3000, trop neg, Cr 4.2, bun 45, wbc 3.77, glucose 51. CXR concern for pulmonary congestion.  requested admission for CHF exacerbation with RIKA on CKD.        ECHO 6/2019  · Technically difficult study  · Severely decreased left ventricular systolic function. The estimated ejection fraction is 25-30%  · Mild concentric left ventricular hypertrophy.  · Moderate left atrial enlargement.  · Grade III (severe) left ventricular diastolic dysfunction consistent with restrictive physiology.  · Elevated left atrial pressure.  · Mild right ventricular enlargement.  · Mild right atrial enlargement.  · Mild aortic regurgitation.  · Mild to moderate tricuspid regurgitation.  · Elevated central venous pressure (15 mm Hg).  · The estimated PA systolic pressure is 57 mm Hg  Pulmonary hypertension  present.    Overview/Hospital Course:  Admitted to  for acute on chronic CHF and RIKA on CKD. Cardiology and nephology consulted. Started on IV diuretics with minimal output.    Interval History: No acute events overnight. No new complaints. Afebrile and HDS. Feels depressed from all her medical issues, tearful during conversation.     Review of Systems   Constitutional: Negative for chills, diaphoresis, fatigue and fever.   HENT: Negative.    Eyes: Negative.    Respiratory: Positive for shortness of breath and wheezing. Negative for apnea, cough, choking, chest tightness and stridor.    Cardiovascular: Positive for leg swelling. Negative for chest pain and palpitations.   Gastrointestinal: Negative.    Genitourinary: Negative.    Musculoskeletal: Negative.    Skin: Negative for color change, pallor, rash and wound.   Hematological: Negative.    Psychiatric/Behavioral: Negative.      Objective:     Vital Signs (Most Recent):  Temp: 98.2 °F (36.8 °C) (08/10/19 1509)  Pulse: 61 (08/10/19 1509)  Resp: 19 (08/10/19 1509)  BP: 119/67 (08/10/19 1509)  SpO2: (!) 93 % (08/10/19 1509) Vital Signs (24h Range):  Temp:  [97.6 °F (36.4 °C)-98.5 °F (36.9 °C)] 98.2 °F (36.8 °C)  Pulse:  [61-72] 61  Resp:  [18-20] 19  SpO2:  [91 %-100 %] 93 %  BP: (111-124)/(56-75) 119/67     Weight: 96 kg (211 lb 10.3 oz)  Body mass index is 38.71 kg/m².    Physical Exam   Constitutional: She is oriented to person, place, and time. She appears well-developed and well-nourished. No distress.   HENT:   Head: Normocephalic and atraumatic.   Mouth/Throat: No oropharyngeal exudate.   Eyes: Pupils are equal, round, and reactive to light. EOM are normal. No scleral icterus.   Neck: Normal range of motion. Neck supple.  Pulmonary/Chest: Effort normal. She has wheezes. She has rales.   Decreased bibasilar BS. Bilateral wheeze.   Abdominal: Soft. Bowel sounds are normal. She exhibits no distension. There is no tenderness.   Musculoskeletal: Normal range of  motion. She exhibits edema (+3 bilateral LE edema).   Neurological: She is alert and oriented to person, place, and time.   Skin: Skin is warm and dry. Capillary refill takes less than 2 seconds. No rash noted. She is not diaphoretic. No erythema. No pallor.   Psychiatric: She has a normal mood and affect. Her behavior is normal. Judgment and thought content normal.   Nursing note and vitals reviewed.       Significant Labs: All pertinent labs within the past 24 hours have been reviewed.    Significant Imaging: I have reviewed and interpreted all pertinent imaging results/findings within the past 24 hours.      Assessment/Plan:      * Acute on chronic combined systolic and diastolic congestive heart failure  Presenting with prog worsening dyspnea with LE swelling, weight gain, elevated probnp and CXR noting vascular congestion. Admit to telemetry. Troponins negative and EKG wo any new changes.  - Nephrology and cardiology consulted, appreciate recs  - Continue IV bumex 2 mg, added metolazone   - Cont BB (changed coreg to metoprolol due to low bp)  - Fluid restriction, Low sodium cardiac Diet  - Daily standing weight and strict ios - minimal UOP  - Optimize electrolytes (K>4 & Mg > 2)  - Daily LABS - cbc, bmp, and mg    Acute renal failure superimposed on stage 4 chronic kidney disease  Baseline Cr 3. On admission 4.2.   Nephrology consulted  As above  Renally dose meds, and avoid nephrotoxic meds as able  Urology consulted by nephro for possible renal mass - low suspicion for renal mass at this time, can get repeat u/s at [facility]    Abnormal renal ultrasound  As above.    Hypothyroidism  Resume home synthroid     Tobacco abuse  Stopped smoking few weeks back. Encouraged cessation.    Diabetes type 2, uncontrolled  Hypoglycemic on admission. Hold oral med and insulin for now. SSI low dose with accuchecks. Currently her glucose has been optimal with just ssi. Hypoglycemic protocol in place. Continue asa and statin.      Obesity  Lifestyle modifications endorsed    Ischemic cardiomyopathy  As above. Continue asa, plavix, statin and BB    AICD (automatic cardioverter/defibrillator) present  Noted.    Hyperlipidemia  Resume home statin      VTE Risk Mitigation (From admission, onward)        Ordered     enoxaparin injection 30 mg  Daily      08/09/19 1710     IP VTE HIGH RISK PATIENT  Once      08/09/19 1705     Place sequential compression device  Until discontinued      08/09/19 1636            Nitin Fernandez MD  Department of Hospital Medicine   [facility]

## 2019-08-10 NOTE — NURSING
1030- ate breakfast meal .  at bedside . Patient looks very tired and tends to nod off and close her eyes whether she is sitting up talking to you or laying in bed . She has a cough but its not a productive cough with wheezing noted. Bed alarm on call light in reach head of bed up rails up x 3.       1235- patient eat lunch instructed on clean catch urine sample provided obstetrical wipes and how to use before urinating . Patient voiced understanding. Denies pain . No acute distress . No pacer spikes noted on telemetry monitor.  at bedside patient is compliant with fluid restriction .     1315- urine sample obtained and sent to lab . Both dr reeves and dr garcia have seen patient . Continue to monitor.

## 2019-08-11 VITALS
HEART RATE: 64 BPM | HEIGHT: 62 IN | SYSTOLIC BLOOD PRESSURE: 116 MMHG | OXYGEN SATURATION: 99 % | RESPIRATION RATE: 18 BRPM | BODY MASS INDEX: 38.94 KG/M2 | WEIGHT: 211.63 LBS | DIASTOLIC BLOOD PRESSURE: 73 MMHG | TEMPERATURE: 98 F

## 2019-08-11 PROBLEM — N18.5 STAGE 5 CHRONIC KIDNEY DISEASE NOT ON CHRONIC DIALYSIS: Status: ACTIVE | Noted: 2019-08-11

## 2019-08-11 LAB
25(OH)D3+25(OH)D2 SERPL-MCNC: 23 NG/ML (ref 30–96)
ALBUMIN SERPL BCP-MCNC: 3.6 G/DL (ref 3.5–5.2)
ALP SERPL-CCNC: 308 U/L (ref 55–135)
ALT SERPL W/O P-5'-P-CCNC: 15 U/L (ref 10–44)
ANION GAP SERPL CALC-SCNC: 14 MMOL/L (ref 8–16)
ANISOCYTOSIS BLD QL SMEAR: SLIGHT
AST SERPL-CCNC: 24 U/L (ref 10–40)
BASOPHILS # BLD AUTO: 0 K/UL (ref 0–0.2)
BASOPHILS NFR BLD: 0 % (ref 0–1.9)
BILIRUB SERPL-MCNC: 0.7 MG/DL (ref 0.1–1)
BUN SERPL-MCNC: 49 MG/DL (ref 8–23)
CALCIUM SERPL-MCNC: 9.6 MG/DL (ref 8.7–10.5)
CHLORIDE SERPL-SCNC: 87 MMOL/L (ref 95–110)
CO2 SERPL-SCNC: 30 MMOL/L (ref 23–29)
CREAT SERPL-MCNC: 4.2 MG/DL (ref 0.5–1.4)
DACRYOCYTES BLD QL SMEAR: ABNORMAL
DIFFERENTIAL METHOD: ABNORMAL
EOSINOPHIL # BLD AUTO: 0.1 K/UL (ref 0–0.5)
EOSINOPHIL NFR BLD: 1.6 % (ref 0–8)
ERYTHROCYTE [DISTWIDTH] IN BLOOD BY AUTOMATED COUNT: 18.7 % (ref 11.5–14.5)
EST. GFR  (AFRICAN AMERICAN): 12 ML/MIN/1.73 M^2
EST. GFR  (NON AFRICAN AMERICAN): 11 ML/MIN/1.73 M^2
GLUCOSE SERPL-MCNC: 107 MG/DL (ref 70–110)
HCT VFR BLD AUTO: 33.7 % (ref 37–48.5)
HGB BLD-MCNC: 10.8 G/DL (ref 12–16)
LYMPHOCYTES # BLD AUTO: 1.8 K/UL (ref 1–4.8)
LYMPHOCYTES NFR BLD: 49.5 % (ref 18–48)
MAGNESIUM SERPL-MCNC: 2.6 MG/DL (ref 1.6–2.6)
MCH RBC QN AUTO: 20.1 PG (ref 27–31)
MCHC RBC AUTO-ENTMCNC: 32 G/DL (ref 32–36)
MCV RBC AUTO: 63 FL (ref 82–98)
MONOCYTES # BLD AUTO: 0.5 K/UL (ref 0.3–1)
MONOCYTES NFR BLD: 14.5 % (ref 4–15)
NEUTROPHILS # BLD AUTO: 1.3 K/UL (ref 1.8–7.7)
NEUTROPHILS NFR BLD: 34.7 % (ref 38–73)
PHOSPHATE SERPL-MCNC: 3.9 MG/DL (ref 2.7–4.5)
PLATELET # BLD AUTO: 215 K/UL (ref 150–350)
PMV BLD AUTO: ABNORMAL FL (ref 9.2–12.9)
POCT GLUCOSE: 112 MG/DL (ref 70–110)
POCT GLUCOSE: 134 MG/DL (ref 70–110)
POIKILOCYTOSIS BLD QL SMEAR: SLIGHT
POLYCHROMASIA BLD QL SMEAR: ABNORMAL
POTASSIUM SERPL-SCNC: 3.6 MMOL/L (ref 3.5–5.1)
PROT SERPL-MCNC: 6.7 G/DL (ref 6–8.4)
RBC # BLD AUTO: 5.37 M/UL (ref 4–5.4)
SODIUM SERPL-SCNC: 131 MMOL/L (ref 136–145)
TARGETS BLD QL SMEAR: ABNORMAL
WBC # BLD AUTO: 3.66 K/UL (ref 3.9–12.7)

## 2019-08-11 PROCEDURE — 80053 COMPREHEN METABOLIC PANEL: CPT

## 2019-08-11 PROCEDURE — 25000242 PHARM REV CODE 250 ALT 637 W/ HCPCS: Performed by: INTERNAL MEDICINE

## 2019-08-11 PROCEDURE — 94640 AIRWAY INHALATION TREATMENT: CPT

## 2019-08-11 PROCEDURE — 25000003 PHARM REV CODE 250: Performed by: INTERNAL MEDICINE

## 2019-08-11 PROCEDURE — S0171 BUMETANIDE 0.5 MG: HCPCS | Performed by: INTERNAL MEDICINE

## 2019-08-11 PROCEDURE — 36415 COLL VENOUS BLD VENIPUNCTURE: CPT

## 2019-08-11 PROCEDURE — 83735 ASSAY OF MAGNESIUM: CPT

## 2019-08-11 PROCEDURE — 84100 ASSAY OF PHOSPHORUS: CPT

## 2019-08-11 PROCEDURE — 94761 N-INVAS EAR/PLS OXIMETRY MLT: CPT

## 2019-08-11 PROCEDURE — 85025 COMPLETE CBC W/AUTO DIFF WBC: CPT

## 2019-08-11 PROCEDURE — 82306 VITAMIN D 25 HYDROXY: CPT

## 2019-08-11 PROCEDURE — 83970 ASSAY OF PARATHORMONE: CPT

## 2019-08-11 RX ORDER — METOPROLOL SUCCINATE 25 MG/1
25 TABLET, EXTENDED RELEASE ORAL DAILY
Qty: 30 TABLET | Refills: 11 | Status: SHIPPED | OUTPATIENT
Start: 2019-08-11 | End: 2020-08-10

## 2019-08-11 RX ORDER — METOLAZONE 5 MG/1
10 TABLET ORAL 2 TIMES DAILY
Status: DISCONTINUED | OUTPATIENT
Start: 2019-08-11 | End: 2019-08-11 | Stop reason: HOSPADM

## 2019-08-11 RX ORDER — FERROUS SULFATE 325(65) MG
325 TABLET ORAL 2 TIMES DAILY
Refills: 0 | COMMUNITY
Start: 2019-08-11

## 2019-08-11 RX ORDER — METOLAZONE 10 MG/1
10 TABLET ORAL EVERY OTHER DAY
Qty: 15 TABLET | Refills: 11 | Status: SHIPPED | OUTPATIENT
Start: 2019-08-11 | End: 2020-08-10

## 2019-08-11 RX ORDER — BUMETANIDE 2 MG/1
2 TABLET ORAL 2 TIMES DAILY
Qty: 60 TABLET | Refills: 11 | Status: SHIPPED | OUTPATIENT
Start: 2019-08-11 | End: 2020-08-10

## 2019-08-11 RX ADMIN — CLOPIDOGREL BISULFATE 75 MG: 75 TABLET ORAL at 10:08

## 2019-08-11 RX ADMIN — POLYETHYLENE GLYCOL 3350 17 G: 17 POWDER, FOR SOLUTION ORAL at 10:08

## 2019-08-11 RX ADMIN — IPRATROPIUM BROMIDE AND ALBUTEROL SULFATE 3 ML: .5; 3 SOLUTION RESPIRATORY (INHALATION) at 01:08

## 2019-08-11 RX ADMIN — AMIODARONE HYDROCHLORIDE 200 MG: 200 TABLET ORAL at 10:08

## 2019-08-11 RX ADMIN — Medication 160 MG: at 09:08

## 2019-08-11 RX ADMIN — IPRATROPIUM BROMIDE AND ALBUTEROL SULFATE 3 ML: .5; 3 SOLUTION RESPIRATORY (INHALATION) at 08:08

## 2019-08-11 RX ADMIN — IPRATROPIUM BROMIDE AND ALBUTEROL SULFATE 3 ML: .5; 3 SOLUTION RESPIRATORY (INHALATION) at 06:08

## 2019-08-11 RX ADMIN — LEVOTHYROXINE SODIUM 50 MCG: 50 TABLET ORAL at 06:08

## 2019-08-11 RX ADMIN — BUMETANIDE 2 MG: 0.25 INJECTION INTRAMUSCULAR; INTRAVENOUS at 10:08

## 2019-08-11 RX ADMIN — METOLAZONE 10 MG: 5 TABLET ORAL at 10:08

## 2019-08-11 RX ADMIN — METOPROLOL TARTRATE 12.5 MG: 25 TABLET, FILM COATED ORAL at 10:08

## 2019-08-11 NOTE — PLAN OF CARE
[facility]    HOME HEALTH ORDERS  FACE TO FACE ENCOUNTER    Patient Name: Charu Oswald  YOB: 1955    PCP: Gerda Carter MD   PCP Address: [address] [place] [address]  PCP Phone Number: [phone]  PCP Fax: [phone]    Encounter Date: 08/11/2019    Admit to Home Health    Diagnoses:  Active Hospital Problems    Diagnosis  POA    *Acute on chronic combined systolic and diastolic congestive heart failure [I50.43]  Yes     Priority: 1 - High    Acute renal failure superimposed on stage 4 chronic kidney disease [N17.9, N18.4]  Yes     Priority: 2     Abnormal renal ultrasound [R93.429]  Yes    Hypothyroidism [E03.9]  Yes    AICD (automatic cardioverter/defibrillator) present [Z95.810]  Yes    Ischemic cardiomyopathy [I25.5]  Yes    Tobacco abuse [Z72.0]  Yes    Obesity [E66.9]  Yes    Hyperlipidemia [E78.5]  Yes    Diabetes type 2, uncontrolled [E11.65]  Yes      Resolved Hospital Problems   No resolved problems to display.       No future appointments.  Follow-up Information     Gerda Carter MD. Schedule an appointment as soon as possible for a visit in 1 week.    Specialty:  Family Medicine  Why:  Post hospital fu, BMP check, Mg check, BP check  Contact information:  [place] [id number] [phone]             Nephrologist . Go in 1 week.                   I have seen and examined this patient face to face today. My clinical findings that support the need for the home health skilled services and home bound status are the following:  Weakness/numbness causing balance and gait disturbance due to Heart Failure making it taxing to leave home.    Allergies:  Review of patient's allergies indicates:   Allergen Reactions    Peanut Shortness Of Breath       Diet: cardiac renal diet with 1L fluid restriction    Activities: as tolerated    Nursing:   SN to complete comprehensive assessment including routine vital signs. Instruct on  disease process and s/s of complications to report to MD. Review/verify medication list sent home with the patient at time of discharge  and instruct patient/caregiver as needed. Frequency may be adjusted depending on start of care date.    Notify MD if SBP > 160 or < 90; DBP > 90 or < 50; HR > 120 or < 50; Temp > 101; Other      CONSULTS:    Physical Therapy to evaluate and treat. Evaluate for home safety and equipment needs; Establish/upgrade home exercise program. Perform / instruct on therapeutic exercises, gait training, transfer training, and Range of Motion.  Occupational Therapy to evaluate and treat. Evaluate home environment for safety and equipment needs. Perform/Instruct on transfers, ADL training, ROM, and therapeutic exercises.      Medications: Review discharge medications with patient and family and provide education.      Current Discharge Medication List      START taking these medications    Details   bumetanide (BUMEX) 2 MG tablet Take 1 tablet (2 mg total) by mouth 2 (two) times daily.  Qty: 60 tablet, Refills: 11      ferrous sulfate (FEOSOL) 325 mg (65 mg iron) Tab tablet Take 1 tablet (325 mg total) by mouth 2 (two) times daily.  Refills: 0      metOLazone (ZAROXOLYN) 10 MG tablet Take 1 tablet (10 mg total) by mouth every other day.  Qty: 15 tablet, Refills: 11      metoprolol succinate (TOPROL-XL) 25 MG 24 hr tablet Take 1 tablet (25 mg total) by mouth once daily.  Qty: 30 tablet, Refills: 11         CONTINUE these medications which have NOT CHANGED    Details   albuterol (PROVENTIL/VENTOLIN HFA) 90 mcg/actuation inhaler Inhale 1-2 puffs into the lungs every 6 (six) hours as needed for Wheezing. Rescue  Qty: 1 Inhaler, Refills: 2      !! albuterol-ipratropium (DUO-NEB) 2.5 mg-0.5 mg/3 mL nebulizer solution Take 3 mLs by nebulization every 6 (six) hours while awake. Rescue  Qty: 1 Box, Refills: 0      !! albuterol-ipratropium (DUO-NEB) 2.5 mg-0.5 mg/3 mL nebulizer solution Take 3 mLs by  nebulization every 6 (six) hours as needed for Wheezing. Rescue  Qty: 1 Box, Refills: 0      amiodarone (PACERONE) 200 MG Tab TAKE 1 TABLET BY MOUTH IN THE MORNING AND A HALF TABLET BY MOUTH IN THE EVENING  Refills: 5      clopidogrel (PLAVIX) 75 mg tablet Take 75 mg by mouth once daily.  Refills: 6      cyclobenzaprine (FLEXERIL) 10 MG tablet Take 15 mg by mouth 2 (two) times daily.      fluticasone propionate (FLONASE) 50 mcg/actuation nasal spray 2 sprays (100 mcg total) by Each Nare route once daily.  Qty: 16 g, Refills: 0      levothyroxine (SYNTHROID) 50 MCG tablet Take 50 mcg by mouth once daily.  Refills: 6      loratadine (CLARITIN) 10 mg tablet Take 1 tablet (10 mg total) by mouth once daily.  Qty: 60 tablet, Refills: 0      pravastatin (PRAVACHOL) 10 MG tablet TK 1 T PO QHS  Refills: 4      tiZANidine (ZANAFLEX) 4 MG tablet Take 4 mg by mouth 2 (two) times daily.       !! - Potential duplicate medications found. Please discuss with provider.      STOP taking these medications       carvedilol (COREG) 6.25 MG tablet Comments:   Reason for Stopping:         furosemide (LASIX) 80 MG tablet Comments:   Reason for Stopping:         potassium chloride SA (K-DUR,KLOR-CON) 10 MEQ tablet Comments:   Reason for Stopping:         furosemide (LASIX) 40 MG tablet Comments:   Reason for Stopping:               I certify that this patient is confined to her home and needs physical therapy and occupational therapy.

## 2019-08-11 NOTE — PLAN OF CARE
"Patient provided with discharge follow-up information.  Information reviewed and placed in :My Healthcare Packet" to be brought home for patient to use as resource after discharge.  Information included:  signs and symptoms to look for and when to call the doctor if experiencing, any symptoms that may indicate a medical emergency: CALL 911. DIGNA informed nurse Alisia SAWYER completed all discharge planning for patient and is clear to discharge from case management standpoint.         08/11/19 1612   Final Note   Assessment Type Final Discharge Note   Anticipated Discharge Disposition Home   Right Care Referral Info   Post Acute Recommendation No Care     "

## 2019-08-11 NOTE — NURSING
Report on patient received from addy wilkerson rn on patients progress and updated handoff report sheet assessment done reviewed plan of care with patient call light in reach bed alarm on call light in reach , head of bed up rails up x 3 ,  at bedside .

## 2019-08-11 NOTE — PROGRESS NOTES
Follow-up Information     Gerda Carter MD. Schedule an appointment as soon as possible for a visit in 1 week.    Specialty:  Family Medicine  Why:  Post hospital fu, BMP check, Mg check, BP check  Contact information:  [place] [address]  [phone]             [facility] - Nephrology/Colon & Rectal/Urology. Schedule an appointment as soon as possible for a visit in 1 week.    Specialties:  Nephrology, Colon and Rectal Surgery, Urology  Why:  Outpatient Services, Nephrology Follow-up Appointment, Please call clinic to obtain appointment  Contact information:  [address] [facility] [place] [address]  [phone]                         [facility] CASE MANAGEMENT                  WRITTEN DISCHARGE INFORMATION      APPOINTMENTS AND RESOURCES TO HELP YOU MANAGE YOUR CARE AT HOME BASED ON YOUR PREFERENCES:  (If an appointment is not scheduled for you when you leave the hospital, call your doctor to schedule a follow up visit within a week)        Healthy Living Instructions to HELP MANAGE YOUR CARE AT HOME:  Things You are responsible for:  1.    Getting your prescriptions filled   2.    Taking your medications as directed, DO NOT MISS ANY DOSES!  3.    Following the diet and exercise recommended by your doctor  4.    Going to your follow-up doctor appointment. This is important because it allows the doctor to monitor your progress and determine if any changes need to made to your treatment plan.  5. If you have any questions about MANAGING YOUR CARE AT HOME Call the Nurse Care Line for 24/7 Assistance [phone]       Please answer any calls you may receive from [facility]. We want to continue to support you as you manage your healthcare needs. [facility] is happy to have the opportunity to serve you.      Thank you for choosing [facility] for your healthcare needs!  Your [facility] Case Management Team,  ELIZABETH Caldwell, LCSW    [phone]  [phone]

## 2019-08-11 NOTE — NURSING
1545- patient following fluid restriction . Denies pain breathless and wheezing at times encouraged to rest . Patient informed of registered dietician to see her if she is still in hospital .  at bedside supportive. No changes on telemetry . Call light in reach bed alarm is on . Rails up x 3. Oxygen is set up if needed. Patient informed her fluid restriction now 1 liter .       1800- no acute changes continue to monitor. Good appetite , no telemetry changes .     1930 report on patient given to addy wilkerson rn on patients progress and updated handoff report sheet no acute events.

## 2019-08-11 NOTE — NURSING
0945- patient ate breakfast meal tolerated well. Denies pain . Patient voiced she wants to go home . Told her it really is up to the doctor. No changes on telemetry . Call light is in her reach bed alarm on head of bed up .        1140- patient awake sitting up in chair watching tv show , denies pain , no acute distress or changes . Vitals stable . Continue to monitor.       1315- ate lunch denies needs , sitting on her bed watching tv , no changes on telemetry . Continue to monitor.       1515- patient has active discharge orders she is speaking to Dr. Fernandez at this time. No acute events.

## 2019-08-11 NOTE — DISCHARGE INSTRUCTIONS
No prescriptions given patient instructed to call her primary for post hospital follow up appointment Monday 8/11/2019 , patient instructed to follow up at [facility] with nephrologist reviewed AVS booklet and medicated on MAR reviewed. Patient instructed to weigh herself daily and follow a low salt diet , monitor her blood sugar at home . Instructed to take medications prescribed by provider as ordered do not skin doses. Patient voiced understanding , patient instructed to monitor blood pressure as well at home .

## 2019-08-11 NOTE — PROGRESS NOTES
Date of Admission:8/9/2019    SUBJECTIVE: notes urinating well    Current Facility-Administered Medications   Medication    acetaminophen tablet 650 mg    albuterol-ipratropium 2.5 mg-0.5 mg/3 mL nebulizer solution 3 mL    amiodarone tablet 200 mg    bisacodyl suppository 10 mg    bumetanide injection 2 mg    clopidogrel tablet 75 mg    dextrose 50% injection 12.5 g    dextrose 50% injection 25 g    enoxaparin injection 30 mg    ferrous sulfate, dried SR tablet 160 mg    glucagon (human recombinant) injection 1 mg    glucose chewable tablet 16 g    glucose chewable tablet 24 g    insulin aspart U-100 pen 0-5 Units    levothyroxine tablet 50 mcg    metOLazone tablet 10 mg    metoprolol tartrate (LOPRESSOR) split tablet 12.5 mg    ondansetron injection 4 mg    polyethylene glycol packet 17 g    pravastatin tablet 10 mg    promethazine (PHENERGAN) 6.25 mg in dextrose 5 % 50 mL IVPB    sodium chloride 0.9% flush 10 mL       Wt Readings from Last 3 Encounters:   08/10/19 96 kg (211 lb 10.3 oz)   06/16/19 90.7 kg (200 lb)   06/12/19 86.2 kg (190 lb)     Temp Readings from Last 3 Encounters:   08/11/19 97.6 °F (36.4 °C)   06/16/19 98.6 °F (37 °C)   06/12/19 97.8 °F (36.6 °C) (Oral)     BP Readings from Last 3 Encounters:   08/11/19 109/77   06/16/19 127/73   06/12/19 (!) 147/88     Pulse Readings from Last 3 Encounters:   08/11/19 63   06/16/19 68   06/12/19 69       Intake/Output Summary (Last 24 hours) at 8/11/2019 1123  Last data filed at 8/11/2019 0414  Gross per 24 hour   Intake 607 ml   Output 1000 ml   Net -393 ml       PE:  GEN:wd female in nad  HEENT:ncat,eomi,mm  CVS:s1s2 regular  PULM:ctab  ABD:+bs, soft,nd  EXT:3+leg edema  NEURO:awake    Recent Labs   Lab 08/11/19  0522      *   K 3.6   CL 87*   CO2 30*   BUN 49*   CREATININE 4.2*   CALCIUM 9.6   MG 2.6       Lab Results   Component Value Date    CALCIUM 9.6 08/11/2019    PHOS 3.9 08/11/2019       Recent Labs   Lab  08/11/19  0522   WBC 3.66*   RBC 5.37   HGB 10.8*   HCT 33.7*      MCV 63*   MCH 20.1*   MCHC 32.0         A/P:  kirill on ckd 4. No hd indicated. Close ot hd.  Acute on combined hf. Cont diuresis. Increase metolazone.  Iron def anemia. Hg ok. No. PRBC. Cont po iron.  Htn.Cont bp meds.  Copd. Following.  Renal mass. Seen by urology. Stable.

## 2019-08-11 NOTE — PLAN OF CARE
Problem: Adult Inpatient Plan of Care  Goal: Plan of Care Review  Outcome: Ongoing (interventions implemented as appropriate)  Pt doing well breathing tx given

## 2019-08-11 NOTE — NURSING
Discharge instructions reviewed with patient , avs booklet reviewed instructed to weigh self everyday and monitor for signs of fluid overload , follow a low salt diet , take the medications that have been prescribed that are new as ordered do not skip doses . Patient voiced understanding. Patient instructed to call [facility] follow up with nephrology department for follow up appointment and to call her primary doctor Monday 8/11/19 for a follow up appointment within one week . Patient voiced understanding . Patient also instructed to weigh herself everyday at home . Patient voiced understanding reviewed epic reference handouts on chf , diabetes , chronic kidney disease.

## 2019-08-12 LAB
FERRITIN SERPL-MCNC: 32 NG/ML (ref 20–300)
PTH-INTACT SERPL-MCNC: 185 PG/ML (ref 9–77)
PTH-INTACT SERPL-MCNC: 216.7 PG/ML (ref 9–77)

## 2019-08-12 NOTE — DISCHARGE SUMMARY
[facility] Medicine  Discharge Summary      Patient Name: Charu Oswald  MRN: [id number]  Admission Date: 8/9/2019  Hospital Length of Stay: 2 days  Discharge Date and Time: 8/11/2019  4:50 PM  Attending Physician: No att. providers found   Discharging Provider: Nitin Fernandez MD  Primary Care Provider: Gerda Carter MD      HPI:   62 yo AA female with hx of Ischemic cardiomyopathy with EF 25% with ICD, LLLB, COPD, tobacco abuse, hypertension, hyperlipidemia, CKD stage 4, severe PVD, Hypothyroidism, presenting with progressively worsening dyspnea for the past several days with associated worsening LE swelling and orthopnea. Denies any fevers, chills, syncope, productive cough, cp, palpitation, PND, abdominal pain, nausea, emesis, dysuria, bleeds. Has had several admissions in the past for CHF exacerbation, most recently few weeks back to [facility]. She states to be complaint with fluid restriction and salt restriction at home. Does not check weight daily.     In the ED patient was hemodynamically stable, afebrile, saturating well on RA. Labs significant BNP ~ 3000, trop neg, Cr 4.2, bun 45, wbc 3.77, glucose 51. CXR concern for pulmonary congestion.  requested admission for CHF exacerbation with RIKA on CKD.        ECHO 6/2019  · Technically difficult study  · Severely decreased left ventricular systolic function. The estimated ejection fraction is 25-30%  · Mild concentric left ventricular hypertrophy.  · Moderate left atrial enlargement.  · Grade III (severe) left ventricular diastolic dysfunction consistent with restrictive physiology.  · Elevated left atrial pressure.  · Mild right ventricular enlargement.  · Mild right atrial enlargement.  · Mild aortic regurgitation.  · Mild to moderate tricuspid regurgitation.  · Elevated central venous pressure (15 mm Hg).  · The estimated PA systolic pressure is 57 mm Hg  Pulmonary hypertension present.    * No surgery found *      Hospital  Course:   Admitted to  for acute on chronic CHF and RIKA on CKD. Cardiology and nephology consulted. Started on IV diuretics with minimal output. Cardiology deferred diuretics to nephrology. Switch coreg to metoprolol due to bp issues. Nephrology added metolazone, with minimal OP. She doesn't not require emergent dialysis at this time. Cr stable 4.2 entire time. Likely her new baseline. Nephrology rec continuing Bumex 2 mg bid with alternative metolazone 10 mg on discharge. Patient stable for d/c today with OP nephrology fu. She is approaching dialysis at this point.      Consults:   Consults (From admission, onward)        Status Ordering Provider     Inpatient consult to Nephrology  Once     Provider:  Loraine Harris MD    Completed LISA BAUMAN     Inpatient consult to Registered Dietitian/Nutritionist  Once     Provider:  (Not yet assigned)    Completed LORAINE HARRIS     Inpatient consult to Urology  Once     Provider:  Tahir Araujo MD    Completed LORAINE HARRIS          Acute renal failure superimposed on stage 4 chronic kidney disease  See hospital course. Patient is now stage 5 CKD.  possible renal mass on US, urology consulted, as per urology low suspicion for renal mass at this time, likely artifact, unable to get IV contrast due to CKD.  Patient to repeat u/s at [facility]. If gets dialysis then can use contrast at that time to further evaluate if needed.    Stage 5 chronic kidney disease not on chronic dialysis  New baseline      Abnormal renal ultrasound  See above    Hypothyroidism  Resume home synthroid     Tobacco abuse  Stopped smoking few weeks back. Encouraged cessation.    Diabetes type 2, uncontrolled  Resume home meds    Obesity  Lifestyle modifications endorsed    Ischemic cardiomyopathy  Continue asa, plavix, statin and BB    AICD (automatic cardioverter/defibrillator) present  Noted.    Hyperlipidemia  Resume home statin      Final Active Diagnoses:    Diagnosis Date Noted POA    Acute renal  failure superimposed on stage 4 chronic kidney disease [N17.9, N18.4] 05/01/2019 Yes    Stage 5 chronic kidney disease not on chronic dialysis [N18.5] 08/11/2019 Yes    Abnormal renal ultrasound [R93.429] 08/10/2019 Yes    Hypothyroidism [E03.9] 08/09/2019 Yes    AICD (automatic cardioverter/defibrillator) present [Z95.810] 06/08/2019 Yes    Ischemic cardiomyopathy [I25.5] 06/08/2019 Yes    Tobacco abuse [Z72.0] 06/08/2019 Yes    Obesity [E66.9] 06/08/2019 Yes    Hyperlipidemia [E78.5]  Yes    Diabetes type 2, uncontrolled [E11.65] 01/15/2016 Yes      Problems Resolved During this Admission:    Diagnosis Date Noted Date Resolved POA    PRINCIPAL PROBLEM:  Acute on chronic combined systolic and diastolic congestive heart failure [I50.43]  08/11/2019 Yes       Discharged Condition: stable    Disposition: Home or Self Care    Follow Up:  Follow-up Information     Gerda Carter MD. Schedule an appointment as soon as possible for a visit in 1 week.    Specialty:  Family Medicine  Why:  Post hospital fu, BMP check, Mg check, BP check  Contact information:  [address] [place] [address]  [phone]             [facility] - Nephrology/Colon & Rectal/Urology. Schedule an appointment as soon as possible for a visit in 1 week.    Specialties:  Nephrology, Colon and Rectal Surgery, Urology  Why:  Outpatient Services, Nephrology Follow-up Appointment, Please call clinic to obtain appointment  Contact information:  [address] [place] [address]  [phone]                 Patient Instructions:   No discharge procedures on file.    Significant Diagnostic Studies: Labs: All labs within the past 24 hours have been reviewed    Pending Diagnostic Studies:     Procedure Component Value Units Date/Time    PTH, intact [[id number]] Collected:  08/11/19 0522    Order Status:  Sent Lab Status:  In process Updated:  08/11/19 0530    Specimen:  Blood          Medications:  Reconciled Home  Medications:      Medication List      START taking these medications    bumetanide 2 MG tablet  Commonly known as:  BUMEX  Take 1 tablet (2 mg total) by mouth 2 (two) times daily.     ferrous sulfate 325 mg (65 mg iron) Tab tablet  Commonly known as:  FEOSOL  Take 1 tablet (325 mg total) by mouth 2 (two) times daily.     metOLazone 10 MG tablet  Commonly known as:  ZAROXOLYN  Take 1 tablet (10 mg total) by mouth every other day.     metoprolol succinate 25 MG 24 hr tablet  Commonly known as:  TOPROL-XL  Take 1 tablet (25 mg total) by mouth once daily.        CONTINUE taking these medications    albuterol 90 mcg/actuation inhaler  Commonly known as:  PROVENTIL/VENTOLIN HFA  Inhale 1-2 puffs into the lungs every 6 (six) hours as needed for Wheezing. Rescue     * albuterol-ipratropium 2.5 mg-0.5 mg/3 mL nebulizer solution  Commonly known as:  DUO-NEB  Take 3 mLs by nebulization every 6 (six) hours while awake. Rescue     * albuterol-ipratropium 2.5 mg-0.5 mg/3 mL nebulizer solution  Commonly known as:  DUO-NEB  Take 3 mLs by nebulization every 6 (six) hours as needed for Wheezing. Rescue     amiodarone 200 MG Tab  Commonly known as:  PACERONE  TAKE 1 TABLET BY MOUTH IN THE MORNING AND A HALF TABLET BY MOUTH IN THE EVENING     clopidogrel 75 mg tablet  Commonly known as:  PLAVIX  Take 75 mg by mouth once daily.     cyclobenzaprine 10 MG tablet  Commonly known as:  FLEXERIL  Take 15 mg by mouth 2 (two) times daily.     fluticasone propionate 50 mcg/actuation nasal spray  Commonly known as:  FLONASE  2 sprays (100 mcg total) by Each Nare route once daily.     levothyroxine 50 MCG tablet  Commonly known as:  SYNTHROID  Take 50 mcg by mouth once daily.     loratadine 10 mg tablet  Commonly known as:  CLARITIN  Take 1 tablet (10 mg total) by mouth once daily.     pravastatin 10 MG tablet  Commonly known as:  PRAVACHOL  TK 1 T PO QHS     tiZANidine 4 MG tablet  Commonly known as:  ZANAFLEX  Take 4 mg by mouth 2 (two) times  daily.         * This list has 2 medication(s) that are the same as other medications prescribed for you. Read the directions carefully, and ask your doctor or other care provider to review them with you.            STOP taking these medications    carvedilol 6.25 MG tablet  Commonly known as:  COREG     furosemide 40 MG tablet  Commonly known as:  LASIX     furosemide 80 MG tablet  Commonly known as:  LASIX     potassium chloride SA 10 MEQ tablet  Commonly known as:  K-DUR,KLOR-CON            Indwelling Lines/Drains at time of discharge:   Lines/Drains/Airways          None          Time spent on the discharge of patient: > 30 minutes  Patient was seen and examined on the date of discharge and determined to be suitable for discharge.         Nitin Fernandez MD  Department of Hospital Medicine  [facility]

## 2019-08-12 NOTE — ASSESSMENT & PLAN NOTE
See hospital course. Patient is now stage 5 CKD.  possible renal mass on US, urology consulted, as per urology low suspicion for renal mass at this time, likely artifact, unable to get IV contrast due to CKD.  Patient to repeat u/s at [facility]. If gets dialysis then can use contrast at that time to further evaluate if needed.

## 2019-08-13 ENCOUNTER — PATIENT OUTREACH (OUTPATIENT)
Dept: ADMINISTRATIVE | Facility: CLINIC | Age: 64
End: 2019-08-13

## 2019-08-14 NOTE — PATIENT INSTRUCTIONS
"Discharge Instructions for Heart Failure  You have been diagnosed with heart failure. The term "heart failure" sounds scary as it suggests the heart has stopped working. But it actually means the heart is not doing its job as well as it should. Heart failure happens when your heart muscle cannot keep up with your body's need for blood flow. Symptoms of heart failure can be controlled by changes in your lifestyle and by following your doctor's advice.   Home Care  Activity   Ask your doctor about an exercise program. You can benefit from simple activities such as walking or gardening. Exercising most days of the week can make you feel better. Don't be discouraged if your progress is slow at first. Rest as needed and stop activity if you develop symptoms such as chest pain, lightheadedness, or significant shortness of breath. Your doctor may prescribe a cardiac rehabilitation program. This is a program to help recover from heart disease through professional lifestyle counseling and education and medically supervised physical activity.   Diet   Follow a heart healthy diet and work hard to remove salt from your diet. Try to limit total salt intake to 2000 mg a day or less. Salt causes your body to retain water, which can make it harder for your heart to pump. You can limit salt by doing the following:  Limit canned, dried, packaged, and fast foods.  Don't add salt to your food at the table.  Season foods with herbs instead of salt when you cook.  Monitor your fluid intake. Drinking too much fluid can make heart failure worse. It is commonly advised to limit total fluid intake to less than 66 ounces (2 liters) a day.  Limit alcohol. Too much alcohol can be harmful to the heart. Alcohol should be limited to no more than one serving a day for women and two servings a day for men.  Tobacco   Break the smoking habit. Smoking increases your chance of having a heart attack, which will worsen heart failure. Quitting smoking is " the number one thing you can do to improve your health. Enroll in a stop smoking program and ask your doctor about medications or nicotine replacement therapy. These methods improve your chances of success.  Medications   Take your medications exactly as prescribed. Learn the names and purpose of each of your medications. Keep an accurate medication list with you at all times including current dosages. Don't skip doses. If you miss a dose of your medication, take it as soon as you remember, unless it's almost time for your next dose. In that case, just wait and take your next dose at the normal time. Don't take a double dose. If you are unsure, contact your doctor or pharmacist.  Weight monitoring   Weigh yourself every day. Do this at the same time of day and in the same kind of clothes. Ideally, weigh yourself first thing every morning after you empty your bladder, but before you eat breakfast. Record your weight and take a record of it to each of your doctor's visit. If your weight increases by 3 pounds in one day or 5 pounds in one week, you should contact your doctor. This is a sign that you are retaining more fluid than you should be, which can worsen heart failure.  Symptoms   Heart failure can cause a variety of symptoms including the following:  Shortness of breath  Difficulty breathing at night  Swelling in the legs and feet or in the abdomen  Becoming easily fatigued  Irregular or rapid heartbeat  Weakness or lightheadedness  It is important to know what to do if you develop signs of worsening heart failure.  Follow-Up  Make a follow-up appointment as directed by our staff. They will provide specific instruction for timing of appointments. Depending on the type and severity of heart failure you have, you may require follow up as early as 7 days from hospital discharge. Keep appointments for checkups and lab tests that are needed to check your medications and condition.  .    When to Call Your Doctor  Call  your doctor right away if you have any of the following signs of worsening heart failure:  Sudden weight gain (3 or more pounds in one day or 5 or more pounds in one week)  Trouble breathing not related to being active  New or increased swelling of your legs or ankles  Swelling or pain in your abdomen  Breathing trouble at night (waking up short of breath, needing more pillows to breathe)  Frequent coughing that doesnt go away  Feeling much more tired than usual  When to Seek Emergency Medical Attention   Call 911 right away if you develop:  Severe shortness of breath, such that you cannot catch your breath, even resting  Severe chest pain that does not resolve with rest or nitroglycerin  Pink, foamy mucus with cough and shortness of breath  A continuous rapid or irregular heartbeat  Passing out or fainting  Acute stroke symptoms such as sudden numbness or weakness on one side of your face, arm, or leg, or sudden confusion, trouble speaking or vision changes.   © 1720-5534 [employer], [address] [place] [address], [place] [address]. All rights reserved. This information is not intended as a substitute for professional medical care. Always follow your healthcare professional's instructions.

## 2021-08-09 NOTE — ED PROVIDER NOTES
Encounter Date: 8/9/2019       History     Chief Complaint   Patient presents with    Generalize weakness     Onset x 2 day.  Pt reports being at MD office and was sent to ER r/t weakness with HR 60.  Denies n/v, dizziness, or fever    Bradycardia     HR 60     63 y.o. female Past Medical History:  No date: Asthma  No date: Congestive heart failure  No date: Diabetes mellitus      Comment:  Type II  No date: Hyperlipidemia  No date: Pacemaker  No date: Pedal edema       Notes that she has been sob for the last few days. Feels very weak. Was recently admitted at  for 5 days for: Acute on chronic combined systolic and diastolic congestive heart failure 07/16/2019    Essential hypertension 07/16/2019    Type 2 diabetes mellitus with diabetic chronic kidney disease 07/16/2019    CKD (chronic kidney disease), stage IV 05/01/2019    Obesity    Ischemic cardiomyopathy    AICD (automatic cardioverter/defibrillator) present       Notes she did not take her am meds today.    Echo 6/2019  · Technically difficult study  · Severely decreased left ventricular systolic function. The estimated ejection fraction is 25-30%  · Mild concentric left ventricular hypertrophy.  · Moderate left atrial enlargement.  · Grade III (severe) left ventricular diastolic dysfunction consistent with restrictive physiology.  · Elevated left atrial pressure.  · Mild right ventricular enlargement.  · Mild right atrial enlargement.  · Mild aortic regurgitation.  · Mild to moderate tricuspid regurgitation.  · Elevated central venous pressure (15 mm Hg).  · The estimated PA systolic pressure is 57 mm Hg  · Pulmonary hypertension present.           Review of patient's allergies indicates:   Allergen Reactions    Nut - unspecified Shortness Of Breath     Past Medical History:   Diagnosis Date    Asthma     Congestive heart failure     Diabetes mellitus     Type II    Hyperlipidemia     Pacemaker     Pedal edema      Past Surgical History:    Procedure Laterality Date    Cardiac bypass  1994    CARDIAC PACEMAKER PLACEMENT  2016     Family History   Problem Relation Age of Onset    Heart disease Father     Heart disease Mother     Cancer Mother         Bone and breast    Breast cancer Mother 50    Diabetes Brother     Diabetes Sister      Social History     Tobacco Use    Smoking status: Former Smoker    Smokeless tobacco: Never Used   Substance Use Topics    Alcohol use: No    Drug use: No     Review of Systems   Constitutional: Negative for fever.   HENT: Negative for sore throat.    Respiratory: Negative for shortness of breath.    Cardiovascular: Negative for chest pain.   Gastrointestinal: Negative for nausea.   Genitourinary: Negative for dysuria.   Musculoskeletal: Negative for back pain.   Skin: Negative for rash.   Neurological: Negative for weakness.   Hematological: Does not bruise/bleed easily.       Physical Exam     Initial Vitals [08/09/19 1200]   BP Pulse Resp Temp SpO2   (!) 102/55 62 16 97.6 °F (36.4 °C) 97 %      MAP       --         Physical Exam    Nursing note and vitals reviewed.  Constitutional: She appears well-developed and well-nourished.   HENT:   Head: Normocephalic and atraumatic.   Eyes: Conjunctivae and EOM are normal. Pupils are equal, round, and reactive to light.   Neck: Normal range of motion.   Cardiovascular: Normal rate and regular rhythm.   Pulmonary/Chest: Breath sounds normal. No respiratory distress.   Abdominal: She exhibits no distension.   Musculoskeletal: Normal range of motion.   Neurological: She is alert. No cranial nerve deficit. GCS score is 15. GCS eye subscore is 4. GCS verbal subscore is 5. GCS motor subscore is 6.   Skin: Skin is warm and dry.   Psychiatric: She has a normal mood and affect. Thought content normal.     +exp wheeze  1+pitting b/l LE    ED Course   Procedures  Labs Reviewed   CBC W/ AUTO DIFFERENTIAL   COMPREHENSIVE METABOLIC PANEL   TSH   URINALYSIS, REFLEX TO URINE  CULTURE   TROPONIN I     EKG Readings: (Independently Interpreted)   Hr 61, sinus, +pvcs, LBBB nsc 6/16/19       Imaging Results    None          Medical Decision Making:   Initial Assessment:   Sob: volume overload vs copd  ?non compliance vs infectious vs ?    7/20 at [facility] 2.76   Today Cr 4.2  Given hx CHF this is a very complicated case.   xr shows mild congestion, she has pitting edema on exam and exp wheeze likely from mild chf vs copd. I have ordered 1mg iv bumex and will admit                      Clinical Impression:       ICD-10-CM ICD-9-CM   1. Congestive heart failure, unspecified HF chronicity, unspecified heart failure type I50.9 428.0   2. Weakness R53.1 780.79   3. Kidney injury S37.009A 866.00   4. Hypoglycemia E16.2 251.2                                Bernardo Rajan MD  08/09/19 0188     no no